# Patient Record
Sex: FEMALE | Race: WHITE | Employment: OTHER | URBAN - METROPOLITAN AREA
[De-identification: names, ages, dates, MRNs, and addresses within clinical notes are randomized per-mention and may not be internally consistent; named-entity substitution may affect disease eponyms.]

---

## 2017-02-22 ENCOUNTER — TELEPHONE (OUTPATIENT)
Dept: INTERNAL MEDICINE CLINIC | Age: 72
End: 2017-02-22

## 2017-02-22 NOTE — TELEPHONE ENCOUNTER
Spoke with Chong Mondragon at Dr Renny Velazquez office who asked for results of last pap smear. Faxed results of last pap smear from 05/15/2012 to Chong Mondragon.

## 2017-02-27 ENCOUNTER — TELEPHONE (OUTPATIENT)
Dept: INTERNAL MEDICINE CLINIC | Age: 72
End: 2017-02-27

## 2017-07-21 ENCOUNTER — OFFICE VISIT (OUTPATIENT)
Dept: INTERNAL MEDICINE CLINIC | Age: 72
End: 2017-07-21

## 2017-07-21 VITALS
BODY MASS INDEX: 22.02 KG/M2 | DIASTOLIC BLOOD PRESSURE: 76 MMHG | TEMPERATURE: 98.1 F | HEART RATE: 72 BPM | WEIGHT: 129 LBS | SYSTOLIC BLOOD PRESSURE: 112 MMHG | HEIGHT: 64 IN | OXYGEN SATURATION: 97 % | RESPIRATION RATE: 12 BRPM

## 2017-07-21 DIAGNOSIS — L60.8 CHANGE IN NAIL APPEARANCE: Primary | ICD-10-CM

## 2017-07-21 NOTE — PROGRESS NOTES
Reena Morrison is a 74 y.o. female who was seen in clinic today (7/21/2017). Assessment & Plan:  Diagnoses and all orders for this visit:    1. Change in nail appearance- new dx, chronic issue, I spent 15 minutes with the patient and >50% of the time was spent counseling on differential diagnosis, treatment options, and likely outcomes. Not entirely sure if this is fungal.  Good possibility it is just the edges of the nail having been lifted up. Reviewed successive p.o. versus topical antifungal meds. Reviewed seeing podiatrist for second opinion and fungal stain. She is okay with monitoring for now and will follow up with her PCP         Follow-up Disposition:  Return if symptoms worsen or fail to improve.       ----------------------------------------------------------------------    Subjective:  Jayshree Schultz was seen today for Nail Problem    Dermatology Review  She is here to talk about toe nail fungus. She noticed it years ago, with gradually worsening since that time. Location: bilateral big toes. Symptoms include: horizontal ridges on the toe nails and discolorations on the edges. No h/o trauma. Does not get pedicures regularly. Treatment to date has included none.      ----------------------------------------------------------------------      Prior to Admission medications    Medication Sig Start Date End Date Taking? Authorizing Provider   aspirin delayed-release 81 mg tablet Take 1 Tab by mouth daily. Yes Kerry Pineda MD   azelastine (ASTELIN) 137 mcg nasal spray as needed. 3/14/14  Yes Historical Provider   CHOLECALCIFEROL, VITAMIN D3, (VITAMIN D3 PO) Take 400 Units by mouth daily. Yes Historical Provider   PV W-O MARIEL/FERROUS FUMARATE/FA (M-VIT PO) Take  by mouth. Takes one multivitamin per day. Yes Historical Provider   CALCIUM CARBONATE (CALCIUM 300 PO) Take 300 mg by mouth daily.    Yes Historical Provider          Allergies   Allergen Reactions    Lipitor [Atorvastatin] Myalgia    Sulfa (Sulfonamide Antibiotics) Hives    Zocor [Simvastatin] Myalgia           Review of Systems   Constitutional: Negative for chills and fever. Skin: Negative for itching and rash. Objective:   Physical Exam   Cardiovascular:   Pulses:       Dorsalis pedis pulses are 2+ on the right side, and 2+ on the left side. Posterior tibial pulses are 2+ on the right side, and 2+ on the left side. Musculoskeletal:        Right foot: There is no bony tenderness and no swelling. Left foot: There is no bony tenderness and no swelling. Skin:   Bilateral 1st toes there is some jack around the tips & edges, no hypertrophied, there are horizontal ridges (multiple), no other deformities         Visit Vitals    /76    Pulse 72    Temp 98.1 °F (36.7 °C) (Oral)    Resp 12    Ht 5' 4\" (1.626 m)    Wt 129 lb (58.5 kg)    SpO2 97%    BMI 22.14 kg/m2         Disclaimer:  Advised her to call back or return to office if symptoms worsen/change/persist.  Discussed expected course/resolution/complications of diagnosis in detail with patient. Medication risks/benefits/costs/interactions/alternatives discussed with patient. She was given an after visit summary which includes diagnoses, current medications, & vitals. She expressed understanding with the diagnosis and plan.         Serafin Hashimoto, MD

## 2017-11-06 ENCOUNTER — HOSPITAL ENCOUNTER (OUTPATIENT)
Dept: MAMMOGRAPHY | Age: 72
Discharge: HOME | End: 2017-11-06
Attending: INTERNAL MEDICINE
Payer: MEDICARE

## 2017-11-06 DIAGNOSIS — Z12.39 BREAST SCREENING: ICD-10-CM

## 2017-11-06 PROCEDURE — 77067 SCR MAMMO BI INCL CAD: CPT

## 2017-11-08 ENCOUNTER — TELEPHONE (OUTPATIENT)
Dept: INTERNAL MEDICINE CLINIC | Age: 72
End: 2017-11-08

## 2017-11-08 DIAGNOSIS — E55.9 VITAMIN D DEFICIENCY: ICD-10-CM

## 2017-11-08 DIAGNOSIS — E78.00 HYPERCHOLESTEREMIA: Primary | ICD-10-CM

## 2017-11-08 NOTE — TELEPHONE ENCOUNTER
----- Message from Juan Horner LPN sent at 37/42/1643  4:55 PM EDT -----  Regarding: FW: Non-Urgent Medical Question  Contact: [phone]  Dr Greg Cortes is out of the office this week and will return 11/07/2017, she will need to sign the lab requisition and it could be left at the  for you to  or mailed but I am unable to email this to you. Thanks    Santa Irwin  ----- Message -----     From: Rogers Lopez     Sent: 10/31/2017   2:11 PM       To: [facility]  Subject: Non-Urgent Medical Question                      I have an appointment on 11/17 for my annual physical.  I parish like to have my blood work completed before the appointment. Can an order for my blood work be emailed to me?   Thanks    Sahil Galindo

## 2017-11-17 ENCOUNTER — OFFICE VISIT (OUTPATIENT)
Dept: INTERNAL MEDICINE CLINIC | Age: 72
End: 2017-11-17

## 2017-11-17 VITALS
RESPIRATION RATE: 16 BRPM | HEIGHT: 64 IN | DIASTOLIC BLOOD PRESSURE: 80 MMHG | HEART RATE: 72 BPM | WEIGHT: 128.4 LBS | BODY MASS INDEX: 21.92 KG/M2 | OXYGEN SATURATION: 98 % | SYSTOLIC BLOOD PRESSURE: 124 MMHG | TEMPERATURE: 97.8 F

## 2017-11-17 DIAGNOSIS — Z13.39 SCREENING FOR ALCOHOLISM: ICD-10-CM

## 2017-11-17 DIAGNOSIS — Z13.31 SCREENING FOR DEPRESSION: ICD-10-CM

## 2017-11-17 DIAGNOSIS — M85.89 OSTEOPENIA OF MULTIPLE SITES: ICD-10-CM

## 2017-11-17 DIAGNOSIS — Z00.00 MEDICARE ANNUAL WELLNESS VISIT, SUBSEQUENT: Primary | ICD-10-CM

## 2017-11-17 DIAGNOSIS — E78.00 HYPERCHOLESTEREMIA: ICD-10-CM

## 2017-11-17 LAB
25(OH)D3+25(OH)D2 SERPL-MCNC: 51.1 NG/ML (ref 30–100)
ALBUMIN SERPL-MCNC: 4.4 G/DL (ref 3.5–4.8)
ALBUMIN/GLOB SERPL: 1.7 {RATIO} (ref 1.2–2.2)
ALP SERPL-CCNC: 83 IU/L (ref 39–117)
ALT SERPL-CCNC: 8 IU/L (ref 0–32)
AST SERPL-CCNC: 14 IU/L (ref 0–40)
BASOPHILS # BLD AUTO: 0 X10E3/UL (ref 0–0.2)
BASOPHILS NFR BLD AUTO: 0 %
BILIRUB SERPL-MCNC: 0.3 MG/DL (ref 0–1.2)
BUN SERPL-MCNC: 16 MG/DL (ref 8–27)
BUN/CREAT SERPL: 20 (ref 12–28)
CALCIUM SERPL-MCNC: 9.2 MG/DL (ref 8.7–10.3)
CHLORIDE SERPL-SCNC: 99 MMOL/L (ref 96–106)
CHOLEST SERPL-MCNC: 264 MG/DL (ref 100–199)
CO2 SERPL-SCNC: 28 MMOL/L (ref 18–29)
CREAT SERPL-MCNC: 0.8 MG/DL (ref 0.57–1)
EOSINOPHIL # BLD AUTO: 0.1 X10E3/UL (ref 0–0.4)
EOSINOPHIL NFR BLD AUTO: 3 %
ERYTHROCYTE [DISTWIDTH] IN BLOOD BY AUTOMATED COUNT: 13.6 % (ref 12.3–15.4)
GFR SERPLBLD CREATININE-BSD FMLA CKD-EPI: 73 ML/MIN/1.73
GFR SERPLBLD CREATININE-BSD FMLA CKD-EPI: 84 ML/MIN/1.73
GLOBULIN SER CALC-MCNC: 2.6 G/DL (ref 1.5–4.5)
GLUCOSE SERPL-MCNC: 87 MG/DL (ref 65–99)
HCT VFR BLD AUTO: 38.4 % (ref 34–46.6)
HDLC SERPL-MCNC: 80 MG/DL
HGB BLD-MCNC: 12.7 G/DL (ref 11.1–15.9)
IMM GRANULOCYTES # BLD: 0 X10E3/UL (ref 0–0.1)
IMM GRANULOCYTES NFR BLD: 0 %
LDLC SERPL CALC-MCNC: 164 MG/DL (ref 0–99)
LYMPHOCYTES # BLD AUTO: 1.3 X10E3/UL (ref 0.7–3.1)
LYMPHOCYTES NFR BLD AUTO: 28 %
MCH RBC QN AUTO: 33.2 PG (ref 26.6–33)
MCHC RBC AUTO-ENTMCNC: 33.1 G/DL (ref 31.5–35.7)
MCV RBC AUTO: 100 FL (ref 79–97)
MONOCYTES # BLD AUTO: 0.4 X10E3/UL (ref 0.1–0.9)
MONOCYTES NFR BLD AUTO: 10 %
NEUTROPHILS # BLD AUTO: 2.6 X10E3/UL (ref 1.4–7)
NEUTROPHILS NFR BLD AUTO: 59 %
PLATELET # BLD AUTO: 268 X10E3/UL (ref 150–379)
POTASSIUM SERPL-SCNC: 4.4 MMOL/L (ref 3.5–5.2)
PROT SERPL-MCNC: 7 G/DL (ref 6–8.5)
RBC # BLD AUTO: 3.83 X10E6/UL (ref 3.77–5.28)
SODIUM SERPL-SCNC: 143 MMOL/L (ref 134–144)
TRIGL SERPL-MCNC: 98 MG/DL (ref 0–149)
VLDLC SERPL CALC-MCNC: 20 MG/DL (ref 5–40)
WBC # BLD AUTO: 4.4 X10E3/UL (ref 3.4–10.8)

## 2017-11-17 NOTE — PROGRESS NOTES
Chief Complaint   Patient presents with   [address] [place] [address] Annual Wellness Visit     1. Have you been to the ER, urgent care clinic since your last visit? Hospitalized since your last visit? No    2. Have you seen or consulted any other health care providers outside of the [address] [place] [address] since your last visit? Include any pap smears or colon screening.  No

## 2017-11-17 NOTE — PROGRESS NOTES
This is a Subsequent Medicare Annual Wellness Exam (AWV) (Performed 12 months after IPPE or effective date of Medicare Part B enrollment)    I have reviewed the patient's medical history in detail and updated the computerized patient record. Right foot pain and is seeing Dr Emelyn Walton at [address] [place] [address] for pain in her foot and is in PT for her ankle pain. Health maintenance reviewed and updated with patient today at visit. Exercise: she is still playing tennis and pickle ball. These activities do not bother feet. Hypercholesterolemia: She is following a good low-fat low-cholesterol diet. Her calcium cardiac score is 0 in the past.  She had been on statins in the past but was not able to tolerate and at this point has not been felt to be necessary. Osteopenia:  Taking calcium and vit D,  last bone density reviewed. No falls. History     Past Medical History:   Diagnosis Date    Allergic rhinitis, cause unspecified 12/17/2009    Anemia NEC     Benign neoplasm of colon     Hypercholesterolemia     Other and unspecified disorder of bone and cartilage     Other and unspecified hyperlipidemia 12/21/2010    S/P colonoscopy 2002      Past Surgical History:   Procedure Laterality Date    BREAST SURGERY PROCEDURE UNLISTED      left & rt. bx - benign    ENDOSCOPY, COLON, DIAGNOSTIC  7/06    due repeat 2011 per note    HX BREAST BIOPSY Left 1990's    benign    HX BREAST BIOPSY Right 1980's    benign    HX BUNIONECTOMY      HX HYSTERECTOMY      partial    HX ORTHOPAEDIC      ACL - right knee    HX ORTHOPAEDIC  2014    right foot surgery    HX ORTHOPAEDIC  2015    left thumb surgery    HX TONSILLECTOMY      US GUIDED CORE BREAST BIOPSY Left 1990's    benign     Current Outpatient Prescriptions   Medication Sig Dispense Refill    aspirin delayed-release 81 mg tablet Take 1 Tab by mouth daily. 30 Tab 11    azelastine (ASTELIN) 137 mcg nasal spray as needed.       CHOLECALCIFEROL, VITAMIN D3, (VITAMIN D3 PO) Take 400 Units by mouth daily.  PV W-O MARIEL/FERROUS FUMARATE/FA (M-VIT PO) Take  by mouth. Takes one multivitamin per day.  CALCIUM CARBONATE (CALCIUM 300 PO) Take 300 mg by mouth daily. Allergies   Allergen Reactions    Lipitor [Atorvastatin] Myalgia    Sulfa (Sulfonamide Antibiotics) Hives    Zocor [Simvastatin] Myalgia     Family History   Problem Relation Age of Onset    Cancer Mother      breast    Breast Cancer Mother 48    Heart Disease Father     Cancer Maternal Grandmother      breast    Breast Cancer Maternal Grandmother      Social History   Substance Use Topics    Smoking status: Former Smoker     Types: Cigarettes     Quit date: 1987    Smokeless tobacco: Never Used      Comment: former cigarette smoker    Alcohol use 0.5 oz/week     1 Standard drinks or equivalent per week     Patient Active Problem List   Diagnosis Code    Osteopenia M85.80    Allergic rhinitis J30.9    Leukopenia D72.819    Hypercholesteremia E78.00       Depression Risk Factor Screening:     PHQ over the last two weeks 2016   Little interest or pleasure in doing things Not at all   Feeling down, depressed or hopeless Not at all   Total Score PHQ 2 0     Alcohol Risk Factor Screenin drink per week. Functional Ability and Level of Safety:   Hearing Loss  Hearing is good. Activities of Daily Living  The home contains: no safety equipment. Patient does total self care    Fall Risk  Fall Risk Assessment, last 12 mths 2016   Able to walk? Yes   Fall in past 12 months? No       Abuse Screen  Patient is not abused    Cognitive Screening   Evaluation of Cognitive Function:  Has your family/caregiver stated any concerns about your memory: no  Normal    Patient Care Team   Patient Care Team:  Manny Calle MD as PCP - General    Assessment/Plan   Education and counseling provided:  Are appropriate based on today's review and evaluation    Diagnoses and all orders for this visit:    1. Medicare annual wellness visit, subsequent  Health maintenance reviewed and updated with patient today at visit. Counseled continue healthy lifestyle, exercise, diet and weight. ..  2. Screening for alcoholism  -     Annual  Alcohol Screen 15 min ()    3. Screening for depression  -     Depression Screen Annual    4. Osteopenia of multiple sites  Calcium, vitamin D, exercise. 5. Hypercholesteremia  Please continue to work on low fat, low cholesterol diet and exercise. Reviewed lab work with her today. Orders Placed This Encounter    Depression Screen Annual    Annual  Alcohol Screen 15 min ()    I have discussed the diagnosis with the patient and the intended plan as seen in the above orders. The patient has received an after-visit summary and questions were answered concerning future plans. I have discussed medication side effects and warnings with the patient as well. She has expressed understanding of the diagnosis and plan    Follow-up Disposition:  Return in about 1 year (around 11/17/2018) for follow up.

## 2017-11-17 NOTE — PATIENT INSTRUCTIONS
Medicare Wellness Visit, Female    The best way to live healthy is to have a healthy lifestyle by eating a well-balanced diet, exercising regularly, limiting alcohol and stopping smoking. Regular physical exams and screening tests are another way to keep healthy. Preventive exams provided by your health care provider can find health problems before they become diseases or illnesses. Preventive services including immunizations, screening tests, monitoring and exams can help you take care of your own health. All people over age 72 should have a pneumovax  and and a prevnar shot to prevent pneumonia. These are once in a lifetime unless you and your provider decide differently. All people over 65 should have a yearly flu shot and a tetanus vaccine every 10 years. A bone mass density to screen for osteoporosis or thinning of the bones should be done every 2 years after 65. Screening for diabetes mellitus with a blood sugar test should be done every year. Glaucoma is a disease of the eye due to increased ocular pressure that can lead to blindness and it should be done every year by an eye professional.    Cardiovascular screening tests that check for elevated lipids (fatty part of blood) which can lead to heart disease and strokes should be done every 5 years. Colorectal screening that evaluates for blood or polyps in your colon should be done yearly as a stool test or every five years as a flexible sigmoidoscope or every 10 years as a colonoscopy up to age 76. Breast cancer screening with a mammogram is recommended  for women age 54-69. Hepatitis C screening is also recommended for anyone born between [birth year] through [address]. A shingles vaccine is also recommended once in a lifetime after age 61. Your Medicare Wellness Exam is recommended annually.     Here is a list of your current Health Maintenance items with a due date:

## 2017-11-17 NOTE — PROGRESS NOTES
Yandy Yarbrough is a 74 y.o. female and presents for Annual Medicare Wellness Visit. Assessment of cognitive impairment: Alert and oriented x ***. Abuse Screen:    Abuse Screening Questionnaire 4/29/2016   Do you ever feel afraid of your partner? N   Are you in a relationship with someone who physically or mentally threatens you? N   Is it safe for you to go home? Y       Depression Screen:   PHQ over the last two weeks 4/29/2016   Little interest or pleasure in doing things Not at all   Feeling down, depressed or hopeless Not at all   Total Score PHQ 2 0       Fall Risk Assessment:    Fall Risk Assessment, last 12 mths 4/29/2016   Able to walk? Yes   Fall in past 12 months? No       Activities of Daily Living:  No flowsheet data found. Health Maintenance:  Daily Low Dose Aspirin: {yes/ no default no:19426::\"contraindicated\",\"allergy\"}  Bone Density: {Immunization status:22111::\"order placed\",\"NA\",\"WNL- due ***\"}  Glaucoma Screening: {yes/ no default no:19426::\"no\"}  Immunizations:    Tetanus: {Immunization status:00851::\"patient declines\",\"order placed\"}. Influenza: {Immunization status:40147::\"patient declines\",\"order placed\"}. Shingles:  {Immunization status:88772::\"patient declines\",\"order placed\"}. Pneumovax:  {Immunization status:62354::\"patient declines\",\"order placed\"}. Prevnar: {Immunization status:37570::\"order placed\",\"patient declines\"}. Cancer screening:    Cervical: {Immunization status:96398::\"done by OBGYN\",\"NA\"}. Breast: {Immunization status:92825::\"done by OBGYN\",\"NA\"}. Colon: {Immunization status:89764::\"referral placed to GI\",\"NA\"}. Prostate:  {Immunization status:34575::\"referral placed to Urology\",\"NA\",\"managed by Urology\"}    Advance Care Planning:   End of Life Planning: {advanced directive:99489::\"has NO advanced directive  - add't info provided\",\"reviewed DNR/DNI and patient is not interested\"}.   Provided pt with \"Respecting Choices packet of Information\" {gen no default/yes/free text:143293::\"yes\"}  Offered facilitator session with NN {gen no default/yes/free text:559401::\"yes\"}     Medications/Allergies: Reviewed with patient  Prior to Admission medications    Medication Sig Start Date End Date Taking? Authorizing Provider   aspirin delayed-release 81 mg tablet Take 1 Tab by mouth daily. Jonny West MD   azelastine (ASTELIN) 137 mcg nasal spray as needed. 3/14/14   Historical Provider   CHOLECALCIFEROL, VITAMIN D3, (VITAMIN D3 PO) Take 400 Units by mouth daily. Historical Provider   PV W-O MARIEL/FERROUS FUMARATE/FA (M-VIT PO) Take  by mouth. Takes one multivitamin per day. Historical Provider   CALCIUM CARBONATE (CALCIUM 300 PO) Take 300 mg by mouth daily.     Historical Provider     Allergies   Allergen Reactions    Lipitor [Atorvastatin] Myalgia    Sulfa (Sulfonamide Antibiotics) Hives    Zocor [Simvastatin] Myalgia       PSH: Reviewed with patient  Past Surgical History:   Procedure Laterality Date    BREAST SURGERY PROCEDURE UNLISTED      left & rt. bx - benign    ENDOSCOPY, COLON, DIAGNOSTIC  7/06    due repeat 2011 per note    HX BREAST BIOPSY Left 1990's    benign    HX BREAST BIOPSY Right 1980's    benign    HX BUNIONECTOMY      HX HYSTERECTOMY      partial    HX ORTHOPAEDIC      ACL - right knee    HX ORTHOPAEDIC  2014    right foot surgery    HX ORTHOPAEDIC  2015    left thumb surgery    HX TONSILLECTOMY      US GUIDED CORE BREAST BIOPSY Left 1990's    benign        SH: Reviewed with patient  Social History   Substance Use Topics    Smoking status: Former Smoker     Types: Cigarettes     Quit date: 1/1/1987    Smokeless tobacco: Never Used      Comment: former cigarette smoker    Alcohol use 0.5 oz/week     1 Standard drinks or equivalent per week       FH: Reviewed with patient  Family History   Problem Relation Age of Onset    Cancer Mother      breast    Breast Cancer Mother 48    Heart Disease Father     Cancer Maternal Grandmother      breast    Breast Cancer Maternal Grandmother          Objective: There were no vitals taken for this visit. There is no height or weight on file to calculate BMI. Alcohol Risk Screen:   On any occasion during past 3 months, have you had more than 3 drinks (female) or 4 drinks (male) containing alcohol? {YES/NO:35459927}  Do you average more than 7 drinks (female) or 14 drinks (male) per week? {YES/NO:57433767}  Type and Amount: {ALCOHOL TYPE:27141}    Tobacco Abuse:  {YES/NO:54987092}    Nutrition Screen:  {Nutrition assessment:40524}    Hearing Loss:  {Desc; hearing loss:41035::\"denies any hearing loss\",\"wears hearing aides\"}    Vision Loss:   Wears glasses, contact lenses, or have any other visual impairment  {gen no default/yes/free text:899379::\"yes\"}    Activities of Daily Living:  {ADL:59409::\"Self-care\"}. Requires assistance with: {ADLs:07538::\"no ADLs\"}  Patient handle his/her own medications  {gen no default/yes/free text:493832::\"yes\"} Use of pill box  {gen no default/yes/free text:848805::\"yes\"}    Current medical providers:    Patient Care Team:  Galina Lyon MD as PCP - General      Plan:      No orders of the defined types were placed in this encounter. Health Maintenance   Topic Date Due    Pneumococcal 65+ High/Highest Risk (2 of 2 - PPSV23) 03/04/2013    MEDICARE YEARLY EXAM  04/30/2017    COLONOSCOPY  06/06/2017    Influenza Age 5 to Adult  08/01/2017    GLAUCOMA SCREENING Q2Y  05/02/2018    DTaP/Tdap/Td series (2 - Td) 05/21/2023    OSTEOPOROSIS SCREENING (DEXA)  Completed    ZOSTER VACCINE AGE 60>  Completed       *Patient verbalized understanding and agreement with the plan. A copy of the After Visit Summary with personalized health plan was given to the patient today. Physical Exam will be performed by PCP and documented under a separate Progress Note.

## 2017-11-30 ENCOUNTER — HOSPITAL ENCOUNTER (OUTPATIENT)
Age: 72
Setting detail: OUTPATIENT SURGERY
Discharge: HOME | End: 2017-11-30
Attending: INTERNAL MEDICINE | Admitting: INTERNAL MEDICINE
Payer: MEDICARE

## 2017-11-30 ENCOUNTER — ANESTHESIA EVENT (OUTPATIENT)
Dept: ENDOSCOPY | Age: 72
End: 2017-11-30
Payer: MEDICARE

## 2017-11-30 ENCOUNTER — ANESTHESIA (OUTPATIENT)
Dept: ENDOSCOPY | Age: 72
End: 2017-11-30
Payer: MEDICARE

## 2017-11-30 VITALS
WEIGHT: 128 LBS | OXYGEN SATURATION: 99 % | RESPIRATION RATE: 16 BRPM | SYSTOLIC BLOOD PRESSURE: 130 MMHG | DIASTOLIC BLOOD PRESSURE: 77 MMHG | TEMPERATURE: 98.1 F | HEART RATE: 67 BPM | BODY MASS INDEX: 21.97 KG/M2

## 2017-11-30 PROCEDURE — 76060000031 HC ANESTHESIA FIRST 0.5 HR: Performed by: INTERNAL MEDICINE

## 2017-11-30 PROCEDURE — 74011250636 HC RX REV CODE- 250/636

## 2017-11-30 PROCEDURE — 76040000019: Performed by: INTERNAL MEDICINE

## 2017-11-30 PROCEDURE — 74011000250 HC RX REV CODE- 250

## 2017-11-30 PROCEDURE — 77030027957 HC TBNG IRR ENDOGTR BUSS -B: Performed by: INTERNAL MEDICINE

## 2017-11-30 RX ORDER — LIDOCAINE HYDROCHLORIDE 20 MG/ML
INJECTION, SOLUTION EPIDURAL; INFILTRATION; INTRACAUDAL; PERINEURAL AS NEEDED
Status: DISCONTINUED | OUTPATIENT
Start: 2017-11-30 | End: 2017-11-30 | Stop reason: HOSPADM

## 2017-11-30 RX ORDER — FLUMAZENIL 0.1 MG/ML
0.2 INJECTION INTRAVENOUS
Status: CANCELLED | OUTPATIENT
Start: 2017-11-30 | End: 2017-11-30

## 2017-11-30 RX ORDER — PROPOFOL 10 MG/ML
INJECTION, EMULSION INTRAVENOUS AS NEEDED
Status: DISCONTINUED | OUTPATIENT
Start: 2017-11-30 | End: 2017-11-30 | Stop reason: HOSPADM

## 2017-11-30 RX ORDER — EPINEPHRINE 0.1 MG/ML
1 INJECTION INTRACARDIAC; INTRAVENOUS
Status: CANCELLED | OUTPATIENT
Start: 2017-11-30 | End: 2017-11-30

## 2017-11-30 RX ORDER — NALOXONE HYDROCHLORIDE 0.4 MG/ML
0.4 INJECTION, SOLUTION INTRAMUSCULAR; INTRAVENOUS; SUBCUTANEOUS
Status: CANCELLED | OUTPATIENT
Start: 2017-11-30 | End: 2017-11-30

## 2017-11-30 RX ORDER — FENTANYL CITRATE 50 UG/ML
100 INJECTION, SOLUTION INTRAMUSCULAR; INTRAVENOUS
Status: CANCELLED | OUTPATIENT
Start: 2017-11-30 | End: 2017-11-30

## 2017-11-30 RX ORDER — DEXTROMETHORPHAN/PSEUDOEPHED 2.5-7.5/.8
1.2 DROPS ORAL
Status: CANCELLED | OUTPATIENT
Start: 2017-11-30

## 2017-11-30 RX ORDER — ATROPINE SULFATE 0.1 MG/ML
0.5 INJECTION INTRAVENOUS
Status: CANCELLED | OUTPATIENT
Start: 2017-11-30 | End: 2017-11-30

## 2017-11-30 RX ORDER — SODIUM CHLORIDE 0.9 % (FLUSH) 0.9 %
5-10 SYRINGE (ML) INJECTION EVERY 8 HOURS
Status: CANCELLED | OUTPATIENT
Start: 2017-11-30 | End: 2017-11-30

## 2017-11-30 RX ORDER — SODIUM CHLORIDE 9 MG/ML
50 INJECTION, SOLUTION INTRAVENOUS CONTINUOUS
Status: CANCELLED | OUTPATIENT
Start: 2017-11-30 | End: 2017-11-30

## 2017-11-30 RX ORDER — MIDAZOLAM HYDROCHLORIDE 1 MG/ML
.25-5 INJECTION, SOLUTION INTRAMUSCULAR; INTRAVENOUS
Status: CANCELLED | OUTPATIENT
Start: 2017-11-30 | End: 2017-11-30

## 2017-11-30 RX ORDER — SODIUM CHLORIDE 9 MG/ML
INJECTION, SOLUTION INTRAVENOUS
Status: DISCONTINUED | OUTPATIENT
Start: 2017-11-30 | End: 2017-11-30 | Stop reason: HOSPADM

## 2017-11-30 RX ORDER — SODIUM CHLORIDE 0.9 % (FLUSH) 0.9 %
5-10 SYRINGE (ML) INJECTION AS NEEDED
Status: CANCELLED | OUTPATIENT
Start: 2017-11-30 | End: 2017-11-30

## 2017-11-30 RX ADMIN — SODIUM CHLORIDE: 9 INJECTION, SOLUTION INTRAVENOUS at 13:10

## 2017-11-30 RX ADMIN — PROPOFOL 100 MG: 10 INJECTION, EMULSION INTRAVENOUS at 13:20

## 2017-11-30 RX ADMIN — LIDOCAINE HYDROCHLORIDE 60 MG: 20 INJECTION, SOLUTION EPIDURAL; INFILTRATION; INTRACAUDAL; PERINEURAL at 13:20

## 2017-11-30 RX ADMIN — PROPOFOL 25 MG: 10 INJECTION, EMULSION INTRAVENOUS at 13:26

## 2017-11-30 RX ADMIN — PROPOFOL 50 MG: 10 INJECTION, EMULSION INTRAVENOUS at 13:24

## 2017-11-30 NOTE — ANESTHESIA PREPROCEDURE EVALUATION
Anesthetic History   No history of anesthetic complications            Review of Systems / Medical History  Patient summary reviewed, nursing notes reviewed and pertinent labs reviewed    Pulmonary  Within defined limits                 Neuro/Psych   Within defined limits           Cardiovascular  Within defined limits                     GI/Hepatic/Renal  Within defined limits              Endo/Other  Within defined limits      Anemia     Other Findings              Physical Exam    Airway  Mallampati: II  TM Distance: > 6 cm  Neck ROM: normal range of motion   Mouth opening: Normal     Cardiovascular  Regular rate and rhythm,  S1 and S2 normal,  no murmur, click, rub, or gallop             Dental  No notable dental hx       Pulmonary  Breath sounds clear to auscultation               Abdominal  GI exam deferred       Other Findings            Anesthetic Plan    ASA: 2  Anesthesia type: MAC          Induction: Intravenous  Anesthetic plan and risks discussed with: Patient

## 2017-11-30 NOTE — ANESTHESIA POSTPROCEDURE EVALUATION
Post-Anesthesia Evaluation and Assessment    Patient: Anirudh Sharpe MRN: [id number]  SSN: [id number]    YOB: 1945  Age: 74 y.o. Sex: female       Cardiovascular Function/Vital Signs  Visit Vitals    /77    Pulse 67    Temp 36.7 °C (98.1 °F)    Resp 16    Wt 58.1 kg (128 lb)    SpO2 99%    BMI 21.97 kg/m2       Patient is status post MAC anesthesia for Procedure(s):  COLONOSCOPY. Nausea/Vomiting: None    Postoperative hydration reviewed and adequate. Pain:  Pain Scale 1: Numeric (0 - 10) (11/30/17 1354)  Pain Intensity 1: 0 (11/30/17 1354)   Managed    Neurological Status: At baseline    Mental Status and Level of Consciousness: Arousable    Pulmonary Status:   O2 Device: Room air (11/30/17 1354)   Adequate oxygenation and airway patent    Complications related to anesthesia: None    Post-anesthesia assessment completed.  No concerns    Signed By: Shobha Caraballo MD     November 30, 2017

## 2017-11-30 NOTE — DISCHARGE INSTRUCTIONS
Anat 62  e  [address]  [id number]  1945    COLON DISCHARGE INSTRUCTIONS    DISCOMFORT:  Redness at IV site- apply warm compress to area; if redness or soreness persist- contact your physician  There may be a slight amount of blood passed from the rectum  Gaseous discomfort- walking, belching will help relieve any discomfort  You may not operate a vehicle for 12 hours  You may not engage in an occupation involving machinery or appliances for rest of today  You may not drink alcoholic beverages for at least 12 hours  Avoid making any critical decisions for at least 24 hour  DIET:   Regular diet. - however -  remember your colon is empty and a heavy meal will produce gas. Avoid these foods:  vegetables, fried / greasy foods, carbonated drinks for today         ACTIVITY:  You may resume your normal daily activities it is recommended that you spend the remainder of the day resting -  avoid any strenuous activity. CALL M.D. ANY SIGN OF:   Increasing pain, nausea, vomiting  Abdominal distension (swelling)  New increased bleeding (oral or rectal)  Fever (chills)  Pain in chest area  Bloody discharge from nose or mouth  Shortness of breath     Follow-up Instructions:   Call Dr. Jonathan Hanna for any questions or problems.    Telephone # [phone]  Should have a repeat colonoscopy in 5 years    Impression:  Diverticulosis

## 2017-11-30 NOTE — IP AVS SNAPSHOT
[address] 
[phone] Patient: Luis Enrique Cadet MRN: [id number] KTB:[id number]/81/[id number] About your hospitalization You were admitted on:  November 30, 2017 You last received care in the:  [facility] ENDOSCOPY You were discharged on:  November 30, 2017 Why you were hospitalized Your primary diagnosis was:  Not on File Discharge Orders None A check jessica indicates which time of day the medication should be taken. My Medications TAKE these medications as instructed Instructions Each Dose to Equal  
 Morning Noon Evening Bedtime  
 aspirin delayed-release 81 mg tablet Your last dose was: Your next dose is: Take 1 Tab by mouth daily. 81 mg  
    
   
   
   
  
 azelastine 137 mcg (0.1 %) nasal spray Commonly known as:  ASTELIN Your last dose was: Your next dose is:    
   
   
 as needed. CALCIUM 300 PO Your last dose was: Your next dose is: Take 300 mg by mouth daily. 300 mg  
    
   
   
   
  
 M-VIT PO Your last dose was: Your next dose is: Take  by mouth. Takes one multivitamin per day. VITAMIN D3 PO Your last dose was: Your next dose is: Take 400 Units by mouth daily. 400 Units Discharge Instructions [address] Luis Enrique Cadet [id number] 
1945 COLON DISCHARGE INSTRUCTIONS DISCOMFORT: 
Redness at IV site- apply warm compress to area; if redness or soreness persist- contact your physician There may be a slight amount of blood passed from the rectum Gaseous discomfort- walking, belching will help relieve any discomfort You may not operate a vehicle for 12 hours You may not engage in an occupation involving machinery or appliances for rest of today You may not drink alcoholic beverages for at least 12 hours Avoid making any critical decisions for at least 24 hour DIET: 
 Regular diet.  however -  remember your colon is empty and a heavy meal will produce gas. Avoid these foods:  vegetables, fried / greasy foods, carbonated drinks for today ACTIVITY: 
You may resume your normal daily activities it is recommended that you spend the remainder of the day resting -  avoid any strenuous activity. CALL M.D. ANY SIGN OF: Increasing pain, nausea, vomiting Abdominal distension (swelling) New increased bleeding (oral or rectal) Fever (chills) Pain in chest area Bloody discharge from nose or mouth Shortness of breath Follow-up Instructions: 
 Call Dr. Ishmael Calzada for any questions or problems. Telephone # [phone] Should have a repeat colonoscopy in 5 years Impression:  Diverticulosis Introducing [facility] SERVICES! Dear Luis Escalera: 
Thank you for requesting a DocVerse account. Our records indicate that you already have an active DocVerse account. You can access your account anytime at [email or web address] Did you know that you can access your hospital and ER discharge instructions at any time in DocVerse? You can also review all of your test results from your hospital stay or ER visit. Additional Information If you have questions, please visit the Frequently Asked Questions section of the DocVerse website at [email or web address] Remember, DocVerse is NOT to be used for urgent needs. For medical emergencies, dial 911. Now available from your iPhone and Android! Providers Seen During Your Hospitalization Provider Specialty Primary office phone Elisa Morton MD Gastroenterology [phone] Your Primary Care Physician (PCP) Primary Care Physician Office Phone Office Fax Via Kee Joshi Fareed Faria [phone] You are allergic to the following Allergen Reactions Lipitor (Atorvastatin) Myalgia Oxycodone Other (comments) Unable to walk, \"completely knocks me out\" Sulfa (Sulfonamide Antibiotics) Hives Zocor (Simvastatin) Myalgia Recent Documentation Weight BMI OB Status Smoking Status 58.1 kg 21.97 kg/m2 Hysterectomy Former Smoker Emergency Contacts Name Discharge Info Relation Home Work Mobile Kassie Briones DISCHARGE CAREGIVER [3] Daughter [21] [phone] Patient Belongings The following personal items are in your possession at time of discharge: 
  Dental Appliances: None  Visual Aid: None Please provide this summary of care documentation to your next provider. Signatures-by signing, you are acknowledging that this After Visit Summary has been reviewed with you and you have received a copy. Patient Signature:  ____________________________________________________________ Date:  ____________________________________________________________  
  
Williams Shaikh Provider Signature:  ____________________________________________________________ Date:  ____________________________________________________________

## 2017-11-30 NOTE — ROUTINE PROCESS
Kerry Tineo  [birth year]  [id number]    Situation:  Verbal report received from: Erin Delatorre RN  Procedure: Procedure(s):  COLONOSCOPY    Background:    Preoperative diagnosis: HISTORY OF POLYPS   Postoperative diagnosis: Diverticulosis    :  Dr. Raimundo Downing  Assistant(s): Endoscopy Technician-1: Ngozi Woodard  Endoscopy RN-1: Hood Barreto RN    Specimens: * No specimens in log *  H. Pylori  no    Assessment:  Intra-procedure medications       Anesthesia gave intra-procedure sedation and medications, see anesthesia flow sheet yes    Intravenous fluids: NS@ KVO     Vital signs stable     Abdominal assessment: round and soft     Recommendation:  Discharge patient per MD order  Family or Friend Daughter will  pt  Permission to share finding with family or friend yes and n/a

## 2017-11-30 NOTE — H&P
[address]  [facility] [place] [address], [address] [facility]                 Ana Rosa Gill is a  74 y.o.  female who presents with history of polyps for screening. .        Past Medical History:   Diagnosis Date    Allergic rhinitis, cause unspecified 12/17/2009    Anemia NEC     Benign neoplasm of colon     Hypercholesterolemia      Past Surgical History:   Procedure Laterality Date    BREAST SURGERY PROCEDURE UNLISTED      left & rt. bx - benign    ENDOSCOPY, COLON, DIAGNOSTIC  7/06    due repeat 2011 per note    HX BREAST BIOPSY Left 1990's    benign    HX BREAST BIOPSY Right 1980's    benign    HX BUNIONECTOMY      HX HYSTERECTOMY      partial    HX ORTHOPAEDIC      ACL - right knee    HX ORTHOPAEDIC  2014    right foot surgery    HX ORTHOPAEDIC  2015    left thumb surgery    HX TONSILLECTOMY      US GUIDED CORE BREAST BIOPSY Left 1990's    benign     Allergies   Allergen Reactions    Lipitor [Atorvastatin] Myalgia    Oxycodone Other (comments)     Unable to walk, \"completely knocks me out\"    Sulfa (Sulfonamide Antibiotics) Hives    Zocor [Simvastatin] Myalgia         Visit Vitals    /85    Temp 98.5 °F (36.9 °C)    Resp 18    Wt 58.1 kg (128 lb)    SpO2 99%    BMI 21.97 kg/m2           PHYSICAL EXAM:  General: WD, WN. Alert, cooperative, no acute distress    HEENT: NC, Atraumatic. PERRLA, EOMI. Anicteric sclerae. Mallampati score 2  Lungs:  CTA Bilaterally. No Wheezing/Rhonchi/Rales. Heart:  Regular  rhythm,  No murmur (), No Rubs, No Gallops  Abdomen: Soft, Non distended, Non tender.  +Bowel sounds, no HSM  Extremities: No c/c/e  Neurologic:  CN 2-12 gi, Alert and oriented X 3. No acute neurological distress   Psych:   Good insight. Not anxious nor agitated. Plan:   Endoscopic procedure with TABITHA Bañuelos MD  11/30/2017  1:17 PM

## 2017-11-30 NOTE — PROCEDURES
[address]  [facility], [address] [facility]         Procedure:  Colonoscopy    :  Cindy Arana MD    Referring Provider: Reynaldo Box MD    Sedation:  MAC anesthesia Propofol      Prior to the procedure its objectives, risks, consequences and alternatives were discussed with the patient who then elected to proceed. The patient had the opportunity to ask questions and those questions were answered. A physical exam was performed. The heart, lungs, and mental status were examined prior to the procedure and found to be satisfactory for conscious sedation and for the procedure. Conscious sedation was initiated by the physician. Continuous pulse oximetry and blood pressure monitoring were used throughout the procedure. After appropriate analgesia and rectal exam, the colonoscope was passed into the anus and passed to the cecum without difficulty. The prep was good. On slow withdrawal of the scope, the cecum, ascending, colon, hepatic flexure, transverse colon, splenic flexure and descending colon were normal. In the sigmoid there were mild diverticulosis. The rectum was normal. Retroflexion exam of the rectum was normal She tolerated the procedure without complication and I recommend a repeat colonoscopy in 5 years. Specimen Removed:  None    Complications: None. EBL:  None.     Cindy Arana MD  11/30/2017  1:33 PM

## 2018-11-26 ENCOUNTER — TELEPHONE (OUTPATIENT)
Dept: INTERNAL MEDICINE CLINIC | Age: 73
End: 2018-11-26

## 2018-11-26 DIAGNOSIS — E78.00 HYPERCHOLESTEREMIA: Primary | ICD-10-CM

## 2018-11-26 NOTE — TELEPHONE ENCOUNTER
Jay Caro (Self) [phone] (H)     Pt would like to come in and have labs done for her appt on 11/29/18.

## 2018-11-26 NOTE — TELEPHONE ENCOUNTER
Last OV: 11-17-17  Next visit: 11-29-18  Last labs: 11-16-17     Pt would like order for labs prior to 11-29-18 appointment.  Labs pended and forwarded to provider for approval.

## 2018-11-28 NOTE — TELEPHONE ENCOUNTER
Voice mail message left for patient notifying lab slips may be picked up at front office . No PHI left on message.

## 2018-11-29 ENCOUNTER — OFFICE VISIT (OUTPATIENT)
Dept: INTERNAL MEDICINE CLINIC | Age: 73
End: 2018-11-29

## 2018-11-29 VITALS
RESPIRATION RATE: 16 BRPM | DIASTOLIC BLOOD PRESSURE: 82 MMHG | OXYGEN SATURATION: 98 % | HEIGHT: 64 IN | HEART RATE: 88 BPM | SYSTOLIC BLOOD PRESSURE: 136 MMHG | WEIGHT: 133.6 LBS | TEMPERATURE: 98.1 F | BODY MASS INDEX: 22.81 KG/M2

## 2018-11-29 DIAGNOSIS — Z23 ENCOUNTER FOR IMMUNIZATION: ICD-10-CM

## 2018-11-29 DIAGNOSIS — Z13.39 SCREENING FOR ALCOHOLISM: ICD-10-CM

## 2018-11-29 DIAGNOSIS — Z13.31 SCREENING FOR DEPRESSION: ICD-10-CM

## 2018-11-29 DIAGNOSIS — M85.89 OSTEOPENIA OF MULTIPLE SITES: ICD-10-CM

## 2018-11-29 DIAGNOSIS — E78.00 HYPERCHOLESTEREMIA: ICD-10-CM

## 2018-11-29 DIAGNOSIS — R03.0 ELEVATED BP WITHOUT DIAGNOSIS OF HYPERTENSION: ICD-10-CM

## 2018-11-29 DIAGNOSIS — Z00.00 MEDICARE ANNUAL WELLNESS VISIT, SUBSEQUENT: Primary | ICD-10-CM

## 2018-11-29 DIAGNOSIS — Z12.31 VISIT FOR SCREENING MAMMOGRAM: ICD-10-CM

## 2018-11-29 LAB
ALBUMIN SERPL-MCNC: 4.5 G/DL (ref 3.5–4.8)
ALBUMIN/GLOB SERPL: 1.9 {RATIO} (ref 1.2–2.2)
ALP SERPL-CCNC: 82 IU/L (ref 39–117)
ALT SERPL-CCNC: 10 IU/L (ref 0–32)
AST SERPL-CCNC: 20 IU/L (ref 0–40)
BASOPHILS # BLD AUTO: 0 X10E3/UL (ref 0–0.2)
BASOPHILS NFR BLD AUTO: 1 %
BILIRUB SERPL-MCNC: 0.5 MG/DL (ref 0–1.2)
BUN SERPL-MCNC: 15 MG/DL (ref 8–27)
BUN/CREAT SERPL: 17 (ref 12–28)
CALCIUM SERPL-MCNC: 9.7 MG/DL (ref 8.7–10.3)
CHLORIDE SERPL-SCNC: 100 MMOL/L (ref 96–106)
CHOLEST SERPL-MCNC: 303 MG/DL (ref 100–199)
CO2 SERPL-SCNC: 25 MMOL/L (ref 20–29)
COMMENT, 011824: ABNORMAL
CREAT SERPL-MCNC: 0.86 MG/DL (ref 0.57–1)
EOSINOPHIL # BLD AUTO: 0.1 X10E3/UL (ref 0–0.4)
EOSINOPHIL NFR BLD AUTO: 3 %
ERYTHROCYTE [DISTWIDTH] IN BLOOD BY AUTOMATED COUNT: 13.6 % (ref 12.3–15.4)
GLOBULIN SER CALC-MCNC: 2.4 G/DL (ref 1.5–4.5)
GLUCOSE SERPL-MCNC: 94 MG/DL (ref 65–99)
HCT VFR BLD AUTO: 38.7 % (ref 34–46.6)
HDLC SERPL-MCNC: 77 MG/DL
HGB BLD-MCNC: 13.2 G/DL (ref 11.1–15.9)
IMM GRANULOCYTES # BLD: 0 X10E3/UL (ref 0–0.1)
IMM GRANULOCYTES NFR BLD: 0 %
LDLC SERPL CALC-MCNC: 194 MG/DL (ref 0–99)
LYMPHOCYTES # BLD AUTO: 1 X10E3/UL (ref 0.7–3.1)
LYMPHOCYTES NFR BLD AUTO: 33 %
MCH RBC QN AUTO: 34.3 PG (ref 26.6–33)
MCHC RBC AUTO-ENTMCNC: 34.1 G/DL (ref 31.5–35.7)
MCV RBC AUTO: 101 FL (ref 79–97)
MONOCYTES # BLD AUTO: 0.4 X10E3/UL (ref 0.1–0.9)
MONOCYTES NFR BLD AUTO: 11 %
NEUTROPHILS # BLD AUTO: 1.6 X10E3/UL (ref 1.4–7)
NEUTROPHILS NFR BLD AUTO: 52 %
PLATELET # BLD AUTO: 248 X10E3/UL (ref 150–379)
POTASSIUM SERPL-SCNC: 4.4 MMOL/L (ref 3.5–5.2)
PROT SERPL-MCNC: 6.9 G/DL (ref 6–8.5)
RBC # BLD AUTO: 3.85 X10E6/UL (ref 3.77–5.28)
SODIUM SERPL-SCNC: 142 MMOL/L (ref 134–144)
TRIGL SERPL-MCNC: 161 MG/DL (ref 0–149)
VLDLC SERPL CALC-MCNC: 32 MG/DL (ref 5–40)
WBC # BLD AUTO: 3.1 X10E3/UL (ref 3.4–10.8)

## 2018-11-29 NOTE — PROGRESS NOTES
Chief Complaint   Patient presents with   [facility] Annual Wellness Visit     Patient states she is here for her AWV physical.  Patient given medical release form for recent eye exam.

## 2018-11-29 NOTE — PATIENT INSTRUCTIONS
Medicare Wellness Visit, Female     The best way to live healthy is to have a lifestyle where you eat a well-balanced diet, exercise regularly, limit alcohol use, and quit all forms of tobacco/nicotine, if applicable. Regular preventive services are another way to keep healthy. Preventive services (vaccines, screening tests, monitoring & exams) can help personalize your care plan, which helps you manage your own care. Screening tests can find health problems at the earliest stages, when they are easiest to treat. [facility] follows the current, evidence-based guidelines published by the [facility] Ilan Alisa ([facility]) when recommending preventive services for our patients. Because we follow these guidelines, sometimes recommendations change over time as research supports it. (For example, mammograms used to be recommended annually. Even though Medicare will still pay for an annual mammogram, the newer guidelines recommend a mammogram every two years for women of average risk.)  Of course, you and your doctor may decide to screen more often for some diseases, based on your risk and your health status. Preventive services for you include:  - Medicare offers their members a free annual wellness visit, which is time for you and your primary care provider to discuss and plan for your preventive service needs. Take advantage of this benefit every year!  -All adults over the age of 72 should receive the recommended pneumonia vaccines. Current USPSTF guidelines recommend a series of two vaccines for the best pneumonia protection.   -All adults should have a flu vaccine yearly and a tetanus vaccine every 10 years. All adults age 61 and older should receive a shingles vaccine once in their lifetime.    -A bone mass density test is recommended when a woman turns 65 to screen for osteoporosis. This test is only recommended one time, as a screening.  Some providers will use this same test as a disease monitoring tool if you already have osteoporosis. -All adults age 38-68 who are overweight should have a diabetes screening test once every three years.   -Other screening tests and preventive services for persons with diabetes include: an eye exam to screen for diabetic retinopathy, a kidney function test, a foot exam, and stricter control over your cholesterol.   -Cardiovascular screening for adults with routine risk involves an electrocardiogram (ECG) at intervals determined by your doctor.   -Colorectal cancer screenings should be done for adults age 54-65 with no increased risk factors for colorectal cancer. There are a number of acceptable methods of screening for this type of cancer. Each test has its own benefits and drawbacks. Discuss with your doctor what is most appropriate for you during your annual wellness visit. The different tests include: colonoscopy (considered the best screening method), a fecal occult blood test, a fecal DNA test, and sigmoidoscopy. -Breast cancer screenings are recommended every other year for women of normal risk, age 54-69.  -Cervical cancer screenings for women over age 72 are only recommended with certain risk factors.   -All adults born between [facility] should be screened once for Hepatitis C.      Here is a list of your current Health Maintenance items (your personalized list of preventive services) with a due date:  Health Maintenance Due   Topic Date Due    Shingles Vaccine (1 of 2) 04/18/1993    Glaucoma Screening   05/02/2018    Pneumococcal Vaccine (2 of 2 - PPSV23) 11/09/2018

## 2018-11-29 NOTE — PROGRESS NOTES
This is the Subsequent Medicare Annual Wellness Exam, performed 12 months or more after the Initial AWV or the last Subsequent AWV    I have reviewed the patient's medical history in detail and updated the computerized patient record. History     Past Medical History:   Diagnosis Date    Allergic rhinitis, cause unspecified 12/17/2009    Anemia NEC     Benign neoplasm of colon     Hypercholesterolemia       Past Surgical History:   Procedure Laterality Date    BREAST SURGERY PROCEDURE UNLISTED      left & rt. bx - benign    COLONOSCOPY N/A 11/30/2017    COLONOSCOPY performed by Gladys Arcos MD at [facility]. [address], COLON, DIAGNOSTIC  7/06    due repeat 2011 per note    HX BREAST BIOPSY Left 1990's    benign    HX BREAST BIOPSY Right 1980's    benign    HX BUNIONECTOMY      HX HYSTERECTOMY      partial    HX ORTHOPAEDIC      ACL - right knee    HX ORTHOPAEDIC  2014    right foot surgery    HX ORTHOPAEDIC  2015    left thumb surgery    HX TONSILLECTOMY      US GUIDED CORE BREAST BIOPSY Left 1990's    benign     Current Outpatient Medications   Medication Sig Dispense Refill    aspirin delayed-release 81 mg tablet Take 1 Tab by mouth daily. 30 Tab 11    azelastine (ASTELIN) 137 mcg nasal spray as needed.  CHOLECALCIFEROL, VITAMIN D3, (VITAMIN D3 PO) Take 400 Units by mouth daily.  PV W-O MARIEL/FERROUS FUMARATE/FA (M-VIT PO) Take  by mouth. Takes one multivitamin per day.  CALCIUM CARBONATE (CALCIUM 300 PO) Take 300 mg by mouth daily.        Allergies   Allergen Reactions    Lipitor [Atorvastatin] Myalgia    Oxycodone Other (comments)     Unable to walk, \"completely knocks me out\"    Sulfa (Sulfonamide Antibiotics) Hives    Zocor [Simvastatin] Myalgia     Family History   Problem Relation Age of Onset    Cancer Mother         breast    Breast Cancer Mother 48    Heart Disease Father     Breast Cancer Maternal Grandmother      Social History     Tobacco Use    Smoking status: Former Smoker     Types: Cigarettes     Last attempt to quit: 1987     Years since quittin.9    Smokeless tobacco: Never Used    Tobacco comment: former cigarette smoker   Substance Use Topics    Alcohol use: Yes     Alcohol/week: 1.2 oz     Types: 1 Standard drinks or equivalent, 1 Glasses of wine per week     Patient Active Problem List   Diagnosis Code    Osteopenia M85.80    Allergic rhinitis J30.9    Hypercholesteremia E78.00       Depression Risk Factor Screening:     PHQ over the last two weeks 2018   Little interest or pleasure in doing things Not at all   Feeling down, depressed, irritable, or hopeless Not at all   Total Score PHQ 2 0     Alcohol Risk Factor Screening:   Reviewed and not at risk. Functional Ability and Level of Safety:   Hearing Loss  Hearing is good. Activities of Daily Living  The home contains: no safety equipment. Patient does total self care    Fall Risk  Fall Risk Assessment, last 12 mths 2018   Able to walk? Yes   Fall in past 12 months? No       Abuse Screen  Patient is not abused    Cognitive Screening   Evaluation of Cognitive Function:  Has your family/caregiver stated any concerns about your memory: no  Normal    Patient Care Team   Patient Care Team:  Carrie Smith MD as PCP - General    Assessment/Plan   Education and counseling provided:  Are appropriate based on today's review and evaluation    Health Maintenance Due   Topic Date Due    Shingrix Vaccine Age 49> (1 of 2) 1993    GLAUCOMA SCREENING Q2Y  2018    Pneumococcal 65+ Low/Medium Risk (2 of 2 - PPSV23) 2018     HPI:  Sebas Wood is  also here today today for follow up: Hypercholesterolemia and osteopenia. Hypercholesterolemia: She is following a good low-fat low-cholesterol diet. Her calcium cardiac score is 0 in the past.  She had been on statins in the past but was not able to tolerate and at this point has not been felt to be necessary.  She is also seeing her cardiologist. Reviewed recent cholesterol higher and was checked just after thanksgiving holiday. Osteopenia:  Taking calcium in diet and supplement and vit D,  last bone density reviewed. No falls. Exercise: resistance, pickle ball and tennis. Current Outpatient Medications   Medication Sig Dispense Refill    aspirin delayed-release 81 mg tablet Take 1 Tab by mouth daily. 30 Tab 11    azelastine (ASTELIN) 137 mcg nasal spray as needed.  CHOLECALCIFEROL, VITAMIN D3, (VITAMIN D3 PO) Take 400 Units by mouth daily.  PV W-O MARIEL/FERROUS FUMARATE/FA (M-VIT PO) Take  by mouth. Takes one multivitamin per day.  CALCIUM CARBONATE (CALCIUM 300 PO) Take 300 mg by mouth daily. Allergies   Allergen Reactions    Lipitor [Atorvastatin] Myalgia    Oxycodone Other (comments)     Unable to walk, \"completely knocks me out\"    Sulfa (Sulfonamide Antibiotics) Hives    Zocor [Simvastatin] Myalgia     Social History     Tobacco Use    Smoking status: Former Smoker     Types: Cigarettes     Last attempt to quit: 1987     Years since quittin.9    Smokeless tobacco: Never Used    Tobacco comment: former cigarette smoker   Substance Use Topics    Alcohol use: Yes     Alcohol/week: 0.5 oz     Types: 1 Standard drinks or equivalent per week         Review of Systems   Constitutional: Negative for chills, fever and malaise/fatigue. HENT: Negative for congestion and sore throat. Eyes: Negative for blurred vision and double vision. Respiratory: Negative for cough, shortness of breath and wheezing. Cardiovascular: Negative for chest pain, palpitations and leg swelling. Gastrointestinal: Negative for abdominal pain, blood in stool, constipation, diarrhea, heartburn, nausea and vomiting. Genitourinary: Negative for dysuria, frequency and urgency. Musculoskeletal: Negative for back pain, joint pain and myalgias. Skin: Negative for rash.    Neurological: Negative for dizziness, sensory change, focal weakness and headaches. Psychiatric/Behavioral: Negative for depression. The patient is not nervous/anxious and does not have insomnia. Physical Exam   Constitutional: She appears well-developed. No distress. HENT:   Head: Normocephalic and atraumatic. Right Ear: Tympanic membrane and ear canal normal.   Left Ear: Tympanic membrane and ear canal normal.   Nose: Nose normal.   Mouth/Throat: Oropharynx is clear and moist.   Eyes: Conjunctivae and EOM are normal. Pupils are equal, round, and reactive to light. Neck: Normal range of motion. No thyromegaly present. Cardiovascular: Normal rate, regular rhythm, normal heart sounds and intact distal pulses. No murmur heard. Pulmonary/Chest: Effort normal and breath sounds normal.   Abdominal: Soft. Bowel sounds are normal. She exhibits no mass. There is no tenderness. Musculoskeletal: She exhibits no edema. Lymphadenopathy:     She has no cervical adenopathy. Neurological: She has normal strength. No cranial nerve deficit or sensory deficit. Skin: No rash noted. Psychiatric: She has a normal mood and affect. Nursing note and vitals reviewed. Visit Vitals  /82   Pulse 88   Temp 98.1 °F (36.7 °C) (Oral)   Resp 16   Ht 5' 4\" (1.626 m)   Wt 133 lb 9.6 oz (60.6 kg)   SpO2 98%   BMI 22.93 kg/m²       Assessment & Plan:    ICD-10-CM ICD-9-CM    1. Medicare annual wellness visit, subsequent  Health maintenance reviewed and updated with patient today at visit. Z00.00 V70.0    2. Screening for alcoholism Z13.39 V79.1 MA ANNUAL ALCOHOL SCREEN 15 MIN   3. Screening for depression Z13.31 V79.0 DEPRESSION SCREEN ANNUAL   4. Encounter for immunization Z23 V03.89 PNEUMOCOCCAL POLYSACCHARIDE VACCINE, 23-VALENT, ADULT OR IMMUNOSUPPRESSED PT DOSE,   5. Visit for screening mammogram Z12.31 V76.12 SOFI 3D JAQUELINE W MAMMO BI SCREENING INCL CAD   6.  Elevated BP without diagnosis of hypertension  Counseled to send blood pressures for review in 2 weeks. Regarding lifestyle changes. Counseled regarding new recommendations on blood pressure goes. Reevaluate in 6 months. R03.0 796.2    7. Hypercholesteremia  Much higher than last check may be related to recent dietary changes with 3 months E78.00 272.0    8. Osteopenia of multiple sites  Continue with calcium primarily in diet if able vitamin D supplementation and exercise. Reviewed bone density with her. M85.89 733.90      Orders Placed This Encounter    Depression Screen Annual    SOFI 3D JAQUELINE W MAMMO BI SCREENING INCL CAD    PNEUMOCOCCAL POLYSACCHARIDE VACCINE, 23-VALENT, ADULT OR IMMUNOSUPPRESSED PT DOSE,    Annual  Alcohol Screen 15 min ()         Follow-up Disposition:  Return in about 6 months (around 5/29/2019). Advised her to call back or return to office if symptoms worsen/change/persist.  Discussed expected course/resolution/complications of diagnosis in detail with patient. Medication risks/benefits/costs/interactions/alternatives discussed with patient. She was given an after visit summary which includes diagnoses, current medications, & vitals. She expressed understanding with the diagnosis and plan.

## 2018-12-21 ENCOUNTER — HOSPITAL ENCOUNTER (OUTPATIENT)
Dept: MAMMOGRAPHY | Age: 73
Discharge: HOME | End: 2018-12-21
Attending: INTERNAL MEDICINE
Payer: MEDICARE

## 2018-12-21 DIAGNOSIS — Z12.31 VISIT FOR SCREENING MAMMOGRAM: ICD-10-CM

## 2018-12-21 PROCEDURE — 77063 BREAST TOMOSYNTHESIS BI: CPT

## 2019-01-07 RX ORDER — VALACYCLOVIR HYDROCHLORIDE 1 G/1
2000 TABLET, FILM COATED ORAL 2 TIMES DAILY
Qty: 8 TAB | Refills: 0 | Status: SHIPPED | OUTPATIENT
Start: 2019-01-07 | End: 2020-10-02 | Stop reason: SDUPTHER

## 2019-02-05 ENCOUNTER — OFFICE VISIT (OUTPATIENT)
Dept: INTERNAL MEDICINE CLINIC | Age: 74
End: 2019-02-05

## 2019-02-05 VITALS
TEMPERATURE: 97.9 F | WEIGHT: 134.8 LBS | HEIGHT: 64 IN | OXYGEN SATURATION: 94 % | RESPIRATION RATE: 16 BRPM | BODY MASS INDEX: 23.01 KG/M2 | DIASTOLIC BLOOD PRESSURE: 90 MMHG | SYSTOLIC BLOOD PRESSURE: 140 MMHG | HEART RATE: 89 BPM

## 2019-02-05 DIAGNOSIS — R82.998 DARK URINE: Primary | ICD-10-CM

## 2019-02-05 LAB
BILIRUB UR QL STRIP: NEGATIVE
GLUCOSE UR-MCNC: NEGATIVE MG/DL
KETONES P FAST UR STRIP-MCNC: NEGATIVE MG/DL
PH UR STRIP: 7 [PH] (ref 4.6–8)
PROT UR QL STRIP: NEGATIVE
SP GR UR STRIP: 1.01 (ref 1–1.03)
UA UROBILINOGEN AMB POC: NORMAL (ref 0.2–1)
URINALYSIS CLARITY POC: CLEAR
URINALYSIS COLOR POC: YELLOW
URINE BLOOD POC: NEGATIVE
URINE LEUKOCYTES POC: NORMAL
URINE NITRITES POC: NEGATIVE

## 2019-02-05 RX ORDER — BETAMETHASONE VALERATE 1.2 MG/G
AEROSOL, FOAM TOPICAL
Refills: 1 | COMMUNITY
Start: 2019-01-15

## 2019-02-05 NOTE — PROGRESS NOTES
Chief Complaint   Patient presents with    Other     dark urine     Patient states she is here with concerns that her urine is dark. Patient denies burning with urination, and blood in urine. Patient states she was treated at [facility] on 1/31/19 for vaginal infection.

## 2019-02-05 NOTE — PROGRESS NOTES
HPI:  Cece Gonzales is a 74y.o. year old female who returns to clinic today for an acute visit:   She reports feeling that her urine is darker than it should be. She has noted the symptoms for the past 2 weeks approximately. She was seen at an urgent care for some vaginal discharge on 2019 and was treated with Diflucan and metronidazole. The vaginal discharge has resolved. Urine culture 19 neg. Reviewed urgent care visit and lab work. Today she denies any urinary urgency, hematuria burning or frequency. Current Outpatient Medications   Medication Sig Dispense Refill    betamethasone valerate (LUXIQ) 0.12 % topical foam MARGO FOAM EXT TO THE SCALP BID FOR RASH  1    aspirin delayed-release 81 mg tablet Take 1 Tab by mouth daily. 30 Tab 11    CHOLECALCIFEROL, VITAMIN D3, (VITAMIN D3 PO) Take 400 Units by mouth daily.  PV W-O MARIEL/FERROUS FUMARATE/FA (M-VIT PO) Take  by mouth. Takes one multivitamin per day.  CALCIUM CARBONATE (CALCIUM 300 PO) Take 300 mg by mouth daily.  azelastine (ASTELIN) 137 mcg nasal spray as needed. Allergies   Allergen Reactions    Lipitor [Atorvastatin] Myalgia    Oxycodone Other (comments)     Unable to walk, \"completely knocks me out\"    Sulfa (Sulfonamide Antibiotics) Hives    Zocor [Simvastatin] Myalgia     Social History     Tobacco Use    Smoking status: Former Smoker     Types: Cigarettes     Last attempt to quit: 1987     Years since quittin.1    Smokeless tobacco: Never Used    Tobacco comment: former cigarette smoker   Substance Use Topics    Alcohol use: Yes     Alcohol/week: 1.2 oz     Types: 1 Glasses of wine, 1 Standard drinks or equivalent per week         Review of Systems   Constitutional: Negative for fever. Gastrointestinal: Negative for abdominal pain. Genitourinary: Negative for dysuria, flank pain, frequency, hematuria and urgency. Physical Exam   Constitutional: She appears well-developed. No distress. HENT:   Head: Normocephalic and atraumatic. Cardiovascular: Normal rate and regular rhythm. Pulmonary/Chest: Effort normal and breath sounds normal.   Abdominal: Soft. Bowel sounds are normal. She exhibits no distension and no mass. There is no tenderness. There is no rebound and no guarding. Nursing note and vitals reviewed. Assessment & Plan:    ICD-10-CM ICD-9-CM    1. Dark urine R82.998 791.9 AMB POC URINALYSIS DIP STICK AUTO W/O MICRO      UA/M W/RFLX CULTURE, ROUTINE   Urine dip negative for evidence of infection. Will send for urinalysis reflex to culture. Encouraged her to increase fluid intake. Follow-up if any acute worsening. Advised her to call back or return to office if symptoms worsen/change/persist.  Discussed expected course/resolution/complications of diagnosis in detail with patient. Medication risks/benefits/costs/interactions/alternatives discussed with patient. She was given an after visit summary which includes diagnoses, current medications, & vitals. She expressed understanding with the diagnosis and plan.

## 2019-02-07 ENCOUNTER — TELEPHONE (OUTPATIENT)
Dept: INTERNAL MEDICINE CLINIC | Age: 74
End: 2019-02-07

## 2019-02-07 LAB
APPEARANCE UR: CLEAR
BACTERIA #/AREA URNS HPF: NORMAL /[HPF]
BACTERIA UR CULT: NORMAL
BILIRUB UR QL STRIP: NEGATIVE
CASTS URNS QL MICRO: NORMAL /LPF
COLOR UR: YELLOW
EPI CELLS #/AREA URNS HPF: NORMAL /HPF
GLUCOSE UR QL: NEGATIVE
HGB UR QL STRIP: NEGATIVE
KETONES UR QL STRIP: NEGATIVE
LEUKOCYTE ESTERASE UR QL STRIP: ABNORMAL
MICRO URNS: ABNORMAL
MUCOUS THREADS URNS QL MICRO: PRESENT
NITRITE UR QL STRIP: NEGATIVE
PH UR STRIP: 6.5 [PH] (ref 5–7.5)
PROT UR QL STRIP: NEGATIVE
RBC #/AREA URNS HPF: NORMAL /HPF
SP GR UR: 1.01 (ref 1–1.03)
URINALYSIS REFLEX, 377202: ABNORMAL
UROBILINOGEN UR STRIP-MCNC: 0.2 MG/DL (ref 0.2–1)
WBC #/AREA URNS HPF: NORMAL /HPF

## 2019-02-07 NOTE — TELEPHONE ENCOUNTER
Patient was seen on 2/5/19 by Dr. Ana Wolf for dark urine. Patient reports vaginal discharge is still present and now it has a small amount of blood in it. Denies any abdominal pain or discomfort. She does not have a GYN, would like recommendation from Dr. Ana Wolf. Please advise.

## 2019-02-07 NOTE — TELEPHONE ENCOUNTER
Patient notified per Dr. James Machado needs to see GYN - advised will send her contact information via [facility].

## 2019-05-29 ENCOUNTER — TELEPHONE (OUTPATIENT)
Dept: INTERNAL MEDICINE CLINIC | Age: 74
End: 2019-05-29

## 2019-05-29 DIAGNOSIS — R03.0 ELEVATED BP WITHOUT DIAGNOSIS OF HYPERTENSION: ICD-10-CM

## 2019-05-29 DIAGNOSIS — E78.00 HYPERCHOLESTEREMIA: Primary | ICD-10-CM

## 2019-05-30 ENCOUNTER — OFFICE VISIT (OUTPATIENT)
Dept: INTERNAL MEDICINE CLINIC | Age: 74
End: 2019-05-30

## 2019-05-30 VITALS
SYSTOLIC BLOOD PRESSURE: 128 MMHG | TEMPERATURE: 97.8 F | DIASTOLIC BLOOD PRESSURE: 80 MMHG | HEART RATE: 70 BPM | BODY MASS INDEX: 22.94 KG/M2 | HEIGHT: 64 IN | OXYGEN SATURATION: 96 % | RESPIRATION RATE: 16 BRPM | WEIGHT: 134.4 LBS

## 2019-05-30 DIAGNOSIS — S39.012A STRAIN OF LUMBAR REGION, INITIAL ENCOUNTER: ICD-10-CM

## 2019-05-30 DIAGNOSIS — M85.89 OSTEOPENIA OF MULTIPLE SITES: ICD-10-CM

## 2019-05-30 DIAGNOSIS — Z78.0 POSTMENOPAUSAL: ICD-10-CM

## 2019-05-30 DIAGNOSIS — E78.00 HYPERCHOLESTEREMIA: Primary | ICD-10-CM

## 2019-05-30 NOTE — PATIENT INSTRUCTIONS
Back Strain: Care Instructions  Overview       Low Back Pain: Exercises  Your Care Instructions  Here are some examples of typical rehabilitation exercises for your condition. Start each exercise slowly. Ease off the exercise if you start to have pain. Your doctor or physical therapist will tell you when you can start these exercises and which ones will work best for you. How to do the exercises  Press-up    1. Lie on your stomach, supporting your body with your forearms. 2. Press your elbows down into the floor to raise your upper back. As you do this, relax your stomach muscles and allow your back to arch without using your back muscles. As your press up, do not let your hips or pelvis come off the floor. 3. Hold for 15 to 30 seconds, then relax. 4. Repeat 2 to 4 times. Alternate arm and leg (bird dog) exercise    1. Start on the floor, on your hands and knees. 2. Tighten your belly muscles. 3. Raise one leg off the floor, and hold it straight out behind you. Be careful not to let your hip drop down, because that will twist your trunk. 4. Hold for about 6 seconds, then lower your leg and switch to the other leg. 5. Repeat 8 to 12 times on each leg. 6. Over time, work up to holding for 10 to 30 seconds each time. 7. If you feel stable and secure with your leg raised, try raising the opposite arm straight out in front of you at the same time. Knee-to-chest exercise    1. Lie on your back with your knees bent and your feet flat on the floor. 2. Bring one knee to your chest, keeping the other foot flat on the floor (or keeping the other leg straight, whichever feels better on your lower back). 3. Keep your lower back pressed to the floor. Hold for at least 15 to 30 seconds. 4. Relax, and lower the knee to the starting position. 5. Repeat with the other leg. Repeat 2 to 4 times with each leg.   6. To get more stretch, put your other leg flat on the floor while pulling your knee to your chest.    Curl-ups    1. Lie on the floor on your back with your knees bent at a 90-degree angle. Your feet should be flat on the floor, about 12 inches from your buttocks. 2. Cross your arms over your chest. If this bothers your neck, try putting your hands behind your neck (not your head), with your elbows spread apart. 3. Slowly tighten your belly muscles and raise your shoulder blades off the floor. 4. Keep your head in line with your body, and do not press your chin to your chest.  5. Hold this position for 1 or 2 seconds, then slowly lower yourself back down to the floor. 6. Repeat 8 to 12 times. Pelvic tilt exercise    1. Lie on your back with your knees bent. 2. \"Brace\" your stomach. This means to tighten your muscles by pulling in and imagining your belly button moving toward your spine. You should feel like your back is pressing to the floor and your hips and pelvis are rocking back. 3. Hold for about 6 seconds while you breathe smoothly. 4. Repeat 8 to 12 times. Heel dig bridging    1. Lie on your back with both knees bent and your ankles bent so that only your heels are digging into the floor. Your knees should be bent about 90 degrees. 2. Then push your heels into the floor, squeeze your buttocks, and lift your hips off the floor until your shoulders, hips, and knees are all in a straight line. 3. Hold for about 6 seconds as you continue to breathe normally, and then slowly lower your hips back down to the floor and rest for up to 10 seconds. 4. Do 8 to 12 repetitions. Hamstring stretch in doorway    1. Lie on your back in a doorway, with one leg through the open door. 2. Slide your leg up the wall to straighten your knee. You should feel a gentle stretch down the back of your leg. 3. Hold the stretch for at least 15 to 30 seconds. Do not arch your back, point your toes, or bend either knee. Keep one heel touching the floor and the other heel touching the wall.   4. Repeat with your other leg. 5. Do 2 to 4 times for each leg. Hip flexor stretch    1. Kneel on the floor with one knee bent and one leg behind you. Place your forward knee over your foot. Keep your other knee touching the floor. 2. Slowly push your hips forward until you feel a stretch in the upper thigh of your rear leg. 3. Hold the stretch for at least 15 to 30 seconds. Repeat with your other leg. 4. Do 2 to 4 times on each side. Wall sit    1. Stand with your back 10 to 12 inches away from a wall. 2. Lean into the wall until your back is flat against it. 3. Slowly slide down until your knees are slightly bent, pressing your lower back into the wall. 4. Hold for about 6 seconds, then slide back up the wall. 5. Repeat 8 to 12 times. Follow-up care is a key part of your treatment and safety. Be sure to make and go to all appointments, and call your doctor if you are having problems. It's also a good idea to know your test results and keep a list of the medicines you take. Where can you learn more? Go to http://eldonZumboxdileep.info/. Enter U455 in the search box to learn more about \"Low Back Pain: Exercises. \"  Current as of: September 20, 2018  Content Version: 11.9  © 1137-4667 [employer]. Care instructions adapted under license by [employer] (which disclaims liability or warranty for this information). If you have questions about a medical condition or this instruction, always ask your healthcare professional. [place] [address] any warranty or liability for your use of this information. A back strain happens when you overstretch, or pull, a muscle in your back. You may hurt your back in an accident or when you exercise or lift something. Sometimes you may not know how you hurt your back. Most back pain will get better with rest and time.  You can take care of yourself at home to help your back heal.  Follow-up care is a key part of your treatment and safety. Be sure to make and go to all appointments, and call your doctor if you are having problems. It's also a good idea to know your test results and keep a list of the medicines you take. How can you care for yourself at home? · Try to stay as active as you can, but stop or reduce any activity that causes pain. · Put ice or a cold pack on the sore muscle for 10 to 20 minutes at a time to stop swelling. Try this every 1 to 2 hours for 3 days (when you are awake) or until the swelling goes down. Put a thin cloth between the ice pack and your skin. · After 2 or 3 days, apply a heating pad on low or a warm cloth to your back. Some doctors suggest that you go back and forth between hot and cold treatments. · Take pain medicines exactly as directed. ? If the doctor gave you a prescription medicine for pain, take it as prescribed. ? If you are not taking a prescription pain medicine, ask your doctor if you can take an over-the-counter medicine. · Try sleeping on your side with a pillow between your legs. Or put a pillow under your knees when you lie on your back. These measures can ease pain in your lower back. · Return to your usual level of activity slowly. When should you call for help? Call 911 anytime you think you may need emergency care. For example, call if:    · You are unable to move a leg at all.   [facility] your doctor now or seek immediate medical care if:    · You have new or worse symptoms in your legs, belly, or buttocks. Symptoms may include:  ? Numbness or tingling. ? Weakness. ? Pain.     · You lose bladder or bowel control.    Watch closely for changes in your health, and be sure to contact your doctor if:    · You have a fever, lose weight, or don't feel well.     · You are not getting better as expected. Where can you learn more? Go to http://eldon-dileep.info/. Enter U852 in the search box to learn more about \"Back Strain: Care Instructions. \"  Current as of: September 20, 2018  Content Version: 11.9  © 5295-2274 [employer]. Care instructions adapted under license by [employer] (which disclaims liability or warranty for this information). If you have questions about a medical condition or this instruction, always ask your healthcare professional. [address] any warranty or liability for your use of this information.

## 2019-05-30 NOTE — PROGRESS NOTES
HPI:  Estefania Hernandez is a 66y.o. year old female who returns to clinic today for follow up:   1. Hypercholesterolemia: Has failed simvastatin and lipitor as meds caused significant muscle aches. eating a pretty healthy healthy diet. Exercise: pickle ball and tennis and walking. 2016 CT coronary calcium 0.   2. Notes back pain left lower started yesterday and improved today. No radiation down her leg. 3. Osteopenia:  Taking calcium and vit D,  last bone density reviewed. No falls. Current Outpatient Medications   Medication Sig Dispense Refill    aspirin delayed-release 81 mg tablet Take 1 Tab by mouth daily. 30 Tab 11    CHOLECALCIFEROL, VITAMIN D3, (VITAMIN D3 PO) Take 400 Units by mouth daily.  PV W-O MARIEL/FERROUS FUMARATE/FA (M-VIT PO) Take  by mouth. Takes one multivitamin per day.  CALCIUM CARBONATE (CALCIUM 300 PO) Take 300 mg by mouth daily.  betamethasone valerate (LUXIQ) 0.12 % topical foam MARGO FOAM EXT TO THE SCALP BID FOR RASH  1    azelastine (ASTELIN) 137 mcg nasal spray as needed. Allergies   Allergen Reactions    Lipitor [Atorvastatin] Myalgia    Oxycodone Other (comments)     Unable to walk, \"completely knocks me out\"    Sulfa (Sulfonamide Antibiotics) Hives    Zocor [Simvastatin] Myalgia     Social History     Tobacco Use    Smoking status: Former Smoker     Types: Cigarettes     Last attempt to quit: 1987     Years since quittin.4    Smokeless tobacco: Never Used    Tobacco comment: former cigarette smoker   Substance Use Topics    Alcohol use: Yes     Alcohol/week: 1.2 oz     Types: 1 Glasses of wine, 1 Standard drinks or equivalent per week         Review of Systems   Respiratory: Negative for shortness of breath. Cardiovascular: Negative for chest pain and leg swelling. Gastrointestinal: Negative for abdominal pain and heartburn. Neurological: Negative for sensory change and focal weakness.      Physical Exam   Constitutional: She appears well-developed. No distress. HENT:   Head: Normocephalic and atraumatic. Cardiovascular: Normal rate, regular rhythm and intact distal pulses. Pulmonary/Chest: Effort normal and breath sounds normal. She has no wheezes. Abdominal: Soft. Bowel sounds are normal. She exhibits no distension and no mass. There is no tenderness. Musculoskeletal: She exhibits no edema. Lumbar back: She exhibits decreased range of motion, tenderness (Left paralumbar) and spasm. She exhibits no deformity. Psychiatric: She has a normal mood and affect. Nursing note and vitals reviewed. Visit Vitals  /80 (BP 1 Location: Right arm)   Pulse 70   Temp 97.8 °F (36.6 °C) (Oral)   Resp 16   Ht 5' 4\" (1.626 m)   Wt 134 lb 6.4 oz (61 kg)   SpO2 96%   BMI 23.07 kg/m²       Assessment & Plan:  Encounter Diagnoses   Name Primary?  Hypercholesteremia   Continue with management with lifestyle. Check lipid panel and if LDL remains elevated start Zetia if lipids remain significantly elevated. She is statin intolerant. Her coronary calcium score of 0 is reassuring. Yes    Osteopenia of multiple sites  Continue calcium, vitamin D     Strain of lumbar region, initial encounter  Given exercises, ice/heat, rest, ibuprofen or Tylenol as needed.  Postmenopausal    Stop aspirin therapy as was being used to prevent for prevention  . Orders Placed This Encounter    DEXA BONE DENSITY STUDY AXIAL    varicella-zoster recombinant, PF, (SHINGRIX, PF,) 50 mcg/0.5 mL susr injection   Follow-up in 6 months or earlier if any problems. Advised her to call back or return to office if symptoms worsen/change/persist.  Discussed expected course/resolution/complications of diagnosis in detail with patient. Medication risks/benefits/costs/interactions/alternatives discussed with patient. She was given an after visit summary which includes diagnoses, current medications, & vitals.   She expressed understanding with the diagnosis and plan.

## 2019-06-02 LAB
ALBUMIN SERPL-MCNC: 4.7 G/DL (ref 3.5–4.8)
ALBUMIN/GLOB SERPL: 2 {RATIO} (ref 1.2–2.2)
ALP SERPL-CCNC: 91 IU/L (ref 39–117)
ALT SERPL-CCNC: 16 IU/L (ref 0–32)
APPEARANCE UR: CLEAR
AST SERPL-CCNC: 20 IU/L (ref 0–40)
BACTERIA #/AREA URNS HPF: ABNORMAL /[HPF]
BACTERIA UR CULT: NORMAL
BASOPHILS # BLD AUTO: 0 X10E3/UL (ref 0–0.2)
BASOPHILS NFR BLD AUTO: 1 %
BILIRUB SERPL-MCNC: 0.4 MG/DL (ref 0–1.2)
BILIRUB UR QL STRIP: NEGATIVE
BUN SERPL-MCNC: 21 MG/DL (ref 8–27)
BUN/CREAT SERPL: 22 (ref 12–28)
CALCIUM SERPL-MCNC: 9.4 MG/DL (ref 8.7–10.3)
CASTS URNS QL MICRO: ABNORMAL /LPF
CHLORIDE SERPL-SCNC: 100 MMOL/L (ref 96–106)
CHOLEST SERPL-MCNC: 290 MG/DL (ref 100–199)
CO2 SERPL-SCNC: 26 MMOL/L (ref 20–29)
COLOR UR: YELLOW
COMMENT, 011824: ABNORMAL
CREAT SERPL-MCNC: 0.96 MG/DL (ref 0.57–1)
EOSINOPHIL # BLD AUTO: 0.1 X10E3/UL (ref 0–0.4)
EOSINOPHIL NFR BLD AUTO: 3 %
EPI CELLS #/AREA URNS HPF: >10 /HPF (ref 0–10)
ERYTHROCYTE [DISTWIDTH] IN BLOOD BY AUTOMATED COUNT: 13.6 % (ref 12.3–15.4)
GLOBULIN SER CALC-MCNC: 2.4 G/DL (ref 1.5–4.5)
GLUCOSE SERPL-MCNC: 91 MG/DL (ref 65–99)
GLUCOSE UR QL: NEGATIVE
HCT VFR BLD AUTO: 40.9 % (ref 34–46.6)
HDLC SERPL-MCNC: 74 MG/DL
HGB BLD-MCNC: 12.8 G/DL (ref 11.1–15.9)
HGB UR QL STRIP: NEGATIVE
IMM GRANULOCYTES # BLD AUTO: 0 X10E3/UL (ref 0–0.1)
IMM GRANULOCYTES NFR BLD AUTO: 0 %
KETONES UR QL STRIP: NEGATIVE
LDLC SERPL CALC-MCNC: 201 MG/DL (ref 0–99)
LEUKOCYTE ESTERASE UR QL STRIP: ABNORMAL
LYMPHOCYTES # BLD AUTO: 1.1 X10E3/UL (ref 0.7–3.1)
LYMPHOCYTES NFR BLD AUTO: 35 %
MCH RBC QN AUTO: 32.3 PG (ref 26.6–33)
MCHC RBC AUTO-ENTMCNC: 31.3 G/DL (ref 31.5–35.7)
MCV RBC AUTO: 103 FL (ref 79–97)
MICRO URNS: ABNORMAL
MONOCYTES # BLD AUTO: 0.4 X10E3/UL (ref 0.1–0.9)
MONOCYTES NFR BLD AUTO: 13 %
MUCOUS THREADS URNS QL MICRO: PRESENT
NEUTROPHILS # BLD AUTO: 1.5 X10E3/UL (ref 1.4–7)
NEUTROPHILS NFR BLD AUTO: 48 %
NITRITE UR QL STRIP: NEGATIVE
PH UR STRIP: 6.5 [PH] (ref 5–7.5)
PLATELET # BLD AUTO: 254 X10E3/UL (ref 150–450)
POTASSIUM SERPL-SCNC: 4.4 MMOL/L (ref 3.5–5.2)
PROT SERPL-MCNC: 7.1 G/DL (ref 6–8.5)
PROT UR QL STRIP: NEGATIVE
RBC # BLD AUTO: 3.96 X10E6/UL (ref 3.77–5.28)
RBC #/AREA URNS HPF: ABNORMAL /HPF (ref 0–2)
SODIUM SERPL-SCNC: 141 MMOL/L (ref 134–144)
SP GR UR: 1.02 (ref 1–1.03)
TRIGL SERPL-MCNC: 77 MG/DL (ref 0–149)
URINALYSIS REFLEX , 377201: ABNORMAL
UROBILINOGEN UR STRIP-MCNC: 0.2 MG/DL (ref 0.2–1)
VLDLC SERPL CALC-MCNC: 15 MG/DL (ref 5–40)
WBC # BLD AUTO: 3.1 X10E3/UL (ref 3.4–10.8)
WBC #/AREA URNS HPF: ABNORMAL /HPF (ref 0–5)

## 2019-06-19 ENCOUNTER — HOSPITAL ENCOUNTER (OUTPATIENT)
Dept: MAMMOGRAPHY | Age: 74
Discharge: HOME | End: 2019-06-19
Attending: INTERNAL MEDICINE
Payer: MEDICARE

## 2019-06-19 DIAGNOSIS — Z78.0 POSTMENOPAUSAL: ICD-10-CM

## 2019-06-19 PROCEDURE — 77080 DXA BONE DENSITY AXIAL: CPT

## 2019-07-18 ENCOUNTER — APPOINTMENT (OUTPATIENT)
Dept: CT IMAGING | Age: 74
End: 2019-07-18
Attending: EMERGENCY MEDICINE
Payer: MEDICARE

## 2019-07-18 ENCOUNTER — HOSPITAL ENCOUNTER (OUTPATIENT)
Age: 74
Setting detail: OBSERVATION
Discharge: HOME | End: 2019-07-19
Attending: EMERGENCY MEDICINE | Admitting: INTERNAL MEDICINE
Payer: MEDICARE

## 2019-07-18 DIAGNOSIS — R42 VERTIGO: ICD-10-CM

## 2019-07-18 DIAGNOSIS — R42 DIZZINESS: Primary | ICD-10-CM

## 2019-07-18 DIAGNOSIS — R26.2 IMPAIRED AMBULATION: ICD-10-CM

## 2019-07-18 LAB
ALBUMIN SERPL-MCNC: 3.8 G/DL (ref 3.5–5)
ALBUMIN/GLOB SERPL: 1.1 {RATIO} (ref 1.1–2.2)
ALP SERPL-CCNC: 86 U/L (ref 45–117)
ALT SERPL-CCNC: 18 U/L (ref 12–78)
ANION GAP SERPL CALC-SCNC: 8 MMOL/L (ref 5–15)
APPEARANCE UR: CLEAR
AST SERPL-CCNC: 20 U/L (ref 15–37)
BACTERIA URNS QL MICRO: NEGATIVE /HPF
BASOPHILS # BLD: 0 K/UL (ref 0–0.1)
BASOPHILS NFR BLD: 0 % (ref 0–1)
BILIRUB SERPL-MCNC: 0.4 MG/DL (ref 0.2–1)
BILIRUB UR QL: NEGATIVE
BUN SERPL-MCNC: 10 MG/DL (ref 6–20)
BUN/CREAT SERPL: 12 (ref 12–20)
CALCIUM SERPL-MCNC: 8.9 MG/DL (ref 8.5–10.1)
CHLORIDE SERPL-SCNC: 100 MMOL/L (ref 97–108)
CK SERPL-CCNC: 69 U/L (ref 26–192)
CO2 SERPL-SCNC: 29 MMOL/L (ref 21–32)
COLOR UR: ABNORMAL
COMMENT, HOLDF: NORMAL
CREAT SERPL-MCNC: 0.82 MG/DL (ref 0.55–1.02)
DIFFERENTIAL METHOD BLD: ABNORMAL
EOSINOPHIL # BLD: 0 K/UL (ref 0–0.4)
EOSINOPHIL NFR BLD: 0 % (ref 0–7)
EPITH CASTS URNS QL MICRO: ABNORMAL /LPF
ERYTHROCYTE [DISTWIDTH] IN BLOOD BY AUTOMATED COUNT: 13 % (ref 11.5–14.5)
GLOBULIN SER CALC-MCNC: 3.5 G/DL (ref 2–4)
GLUCOSE SERPL-MCNC: 116 MG/DL (ref 65–100)
GLUCOSE UR STRIP.AUTO-MCNC: NEGATIVE MG/DL
HCT VFR BLD AUTO: 37.6 % (ref 35–47)
HGB BLD-MCNC: 12.5 G/DL (ref 11.5–16)
HGB UR QL STRIP: NEGATIVE
HYALINE CASTS URNS QL MICRO: ABNORMAL /LPF (ref 0–5)
IMM GRANULOCYTES # BLD AUTO: 0 K/UL (ref 0–0.04)
IMM GRANULOCYTES NFR BLD AUTO: 1 % (ref 0–0.5)
KETONES UR QL STRIP.AUTO: 15 MG/DL
LEUKOCYTE ESTERASE UR QL STRIP.AUTO: NEGATIVE
LYMPHOCYTES # BLD: 0.6 K/UL (ref 0.8–3.5)
LYMPHOCYTES NFR BLD: 15 % (ref 12–49)
MAGNESIUM SERPL-MCNC: 2.2 MG/DL (ref 1.6–2.4)
MCH RBC QN AUTO: 34 PG (ref 26–34)
MCHC RBC AUTO-ENTMCNC: 33.2 G/DL (ref 30–36.5)
MCV RBC AUTO: 102.2 FL (ref 80–99)
MONOCYTES # BLD: 0.3 K/UL (ref 0–1)
MONOCYTES NFR BLD: 6 % (ref 5–13)
NEUTS SEG # BLD: 3.3 K/UL (ref 1.8–8)
NEUTS SEG NFR BLD: 78 % (ref 32–75)
NITRITE UR QL STRIP.AUTO: NEGATIVE
NRBC # BLD: 0 K/UL (ref 0–0.01)
NRBC BLD-RTO: 0 PER 100 WBC
PH UR STRIP: 6.5 [PH] (ref 5–8)
PLATELET # BLD AUTO: 211 K/UL (ref 150–400)
PMV BLD AUTO: 9 FL (ref 8.9–12.9)
POTASSIUM SERPL-SCNC: 3.7 MMOL/L (ref 3.5–5.1)
PROT SERPL-MCNC: 7.3 G/DL (ref 6.4–8.2)
PROT UR STRIP-MCNC: NEGATIVE MG/DL
RBC # BLD AUTO: 3.68 M/UL (ref 3.8–5.2)
RBC #/AREA URNS HPF: ABNORMAL /HPF (ref 0–5)
RBC MORPH BLD: ABNORMAL
SAMPLES BEING HELD,HOLD: NORMAL
SODIUM SERPL-SCNC: 137 MMOL/L (ref 136–145)
SP GR UR REFRACTOMETRY: 1 (ref 1–1.03)
TROPONIN I SERPL-MCNC: <0.05 NG/ML
UR CULT HOLD, URHOLD: NORMAL
UROBILINOGEN UR QL STRIP.AUTO: 0.2 EU/DL (ref 0.2–1)
WBC # BLD AUTO: 4.2 K/UL (ref 3.6–11)
WBC URNS QL MICRO: ABNORMAL /HPF (ref 0–4)

## 2019-07-18 PROCEDURE — 99285 EMERGENCY DEPT VISIT HI MDM: CPT

## 2019-07-18 PROCEDURE — 70450 CT HEAD/BRAIN W/O DYE: CPT

## 2019-07-18 PROCEDURE — 96376 TX/PRO/DX INJ SAME DRUG ADON: CPT

## 2019-07-18 PROCEDURE — 99218 HC RM OBSERVATION: CPT

## 2019-07-18 PROCEDURE — 85025 COMPLETE CBC W/AUTO DIFF WBC: CPT

## 2019-07-18 PROCEDURE — 82550 ASSAY OF CK (CPK): CPT

## 2019-07-18 PROCEDURE — 81001 URINALYSIS AUTO W/SCOPE: CPT

## 2019-07-18 PROCEDURE — 83735 ASSAY OF MAGNESIUM: CPT

## 2019-07-18 PROCEDURE — 36415 COLL VENOUS BLD VENIPUNCTURE: CPT

## 2019-07-18 PROCEDURE — 74011250636 HC RX REV CODE- 250/636: Performed by: EMERGENCY MEDICINE

## 2019-07-18 PROCEDURE — 93005 ELECTROCARDIOGRAM TRACING: CPT

## 2019-07-18 PROCEDURE — 74011250636 HC RX REV CODE- 250/636: Performed by: INTERNAL MEDICINE

## 2019-07-18 PROCEDURE — 96374 THER/PROPH/DIAG INJ IV PUSH: CPT

## 2019-07-18 PROCEDURE — 80053 COMPREHEN METABOLIC PANEL: CPT

## 2019-07-18 PROCEDURE — 74011250637 HC RX REV CODE- 250/637: Performed by: NURSE PRACTITIONER

## 2019-07-18 PROCEDURE — 96361 HYDRATE IV INFUSION ADD-ON: CPT

## 2019-07-18 PROCEDURE — 77030032490 HC SLV COMPR SCD KNE COVD -B

## 2019-07-18 PROCEDURE — 94762 N-INVAS EAR/PLS OXIMTRY CONT: CPT

## 2019-07-18 PROCEDURE — 84484 ASSAY OF TROPONIN QUANT: CPT

## 2019-07-18 RX ORDER — MECLIZINE HCL 12.5 MG 12.5 MG/1
25 TABLET ORAL EVERY 6 HOURS
Status: DISCONTINUED | OUTPATIENT
Start: 2019-07-18 | End: 2019-07-19 | Stop reason: HOSPADM

## 2019-07-18 RX ORDER — ONDANSETRON 2 MG/ML
4 INJECTION INTRAMUSCULAR; INTRAVENOUS
Status: COMPLETED | OUTPATIENT
Start: 2019-07-18 | End: 2019-07-18

## 2019-07-18 RX ORDER — MECLIZINE HYDROCHLORIDE 25 MG/1
25 TABLET ORAL
Qty: 12 TAB | Refills: 0 | Status: ON HOLD | OUTPATIENT
Start: 2019-07-18 | End: 2019-07-19 | Stop reason: SDUPTHER

## 2019-07-18 RX ORDER — SODIUM CHLORIDE 0.9 % (FLUSH) 0.9 %
5-40 SYRINGE (ML) INJECTION EVERY 8 HOURS
Status: DISCONTINUED | OUTPATIENT
Start: 2019-07-18 | End: 2019-07-19 | Stop reason: HOSPADM

## 2019-07-18 RX ORDER — ONDANSETRON 2 MG/ML
4 INJECTION INTRAMUSCULAR; INTRAVENOUS
Status: DISCONTINUED | OUTPATIENT
Start: 2019-07-18 | End: 2019-07-19 | Stop reason: HOSPADM

## 2019-07-18 RX ORDER — AZELASTINE 1 MG/ML
1 SPRAY, METERED NASAL
Status: DISCONTINUED | OUTPATIENT
Start: 2019-07-18 | End: 2019-07-19 | Stop reason: HOSPADM

## 2019-07-18 RX ORDER — SODIUM CHLORIDE 0.9 % (FLUSH) 0.9 %
5-40 SYRINGE (ML) INJECTION AS NEEDED
Status: DISCONTINUED | OUTPATIENT
Start: 2019-07-18 | End: 2019-07-19 | Stop reason: HOSPADM

## 2019-07-18 RX ORDER — MECLIZINE HCL 12.5 MG 12.5 MG/1
25 TABLET ORAL
Status: COMPLETED | OUTPATIENT
Start: 2019-07-18 | End: 2019-07-18

## 2019-07-18 RX ORDER — EZETIMIBE 10 MG/1
10 TABLET ORAL DAILY
Status: DISCONTINUED | OUTPATIENT
Start: 2019-07-19 | End: 2019-07-19 | Stop reason: HOSPADM

## 2019-07-18 RX ORDER — ACETAMINOPHEN 325 MG/1
650 TABLET ORAL
Status: DISCONTINUED | OUTPATIENT
Start: 2019-07-19 | End: 2019-07-19 | Stop reason: HOSPADM

## 2019-07-18 RX ADMIN — ONDANSETRON 4 MG: 2 INJECTION INTRAMUSCULAR; INTRAVENOUS at 16:25

## 2019-07-18 RX ADMIN — ONDANSETRON 4 MG: 2 INJECTION INTRAMUSCULAR; INTRAVENOUS at 19:36

## 2019-07-18 RX ADMIN — MECLIZINE 25 MG: 12.5 TABLET ORAL at 19:30

## 2019-07-18 RX ADMIN — SODIUM CHLORIDE 1000 ML: 900 INJECTION, SOLUTION INTRAVENOUS at 15:49

## 2019-07-18 RX ADMIN — ACETAMINOPHEN: 500 TABLET ORAL at 21:23

## 2019-07-18 RX ADMIN — Medication 10 ML: at 19:05

## 2019-07-18 RX ADMIN — Medication 10 ML: at 21:24

## 2019-07-18 RX ADMIN — MECLIZINE 25 MG: 12.5 TABLET ORAL at 16:25

## 2019-07-18 RX ADMIN — SODIUM CHLORIDE 1000 ML: 900 INJECTION, SOLUTION INTRAVENOUS at 14:35

## 2019-07-18 NOTE — H&P
History and Physical  Primary Care Provider: Moriah Reynolds MD    Subjective:     Bucky Gutierres is a 66 y.o. female with pmh of HLD, seasonal allergies, who presents with dizziness, nausea and vomiting since yesterday morning. Her dizziness only occurs with movement or standing, and additionally she feels unsteady on her feet like she is going to fall. She has had an episode of dizziness in the past, and associated vertigo. She has had a few episodes of NBNB emesis, last vomiting occurred today. Has not been able to tolerate any solid foods, but able to tolerate liquids. Otherwise denies any F/C/URI symptoms/abdominal pain/constipation. Had a few episodes of diarrhea which occurred during her episodes of vomiting. At baseline she ambulates without assistance, and is very active, plays tennis. Review of Systems:    A comprehensive review of systems was negative except for that written in the History of Present Illness. Past Medical History:   Diagnosis Date    Allergic rhinitis, cause unspecified 12/17/2009    Anemia NEC     Benign neoplasm of colon     Hypercholesterolemia       Past Surgical History:   Procedure Laterality Date    BREAST SURGERY PROCEDURE UNLISTED      left & rt. bx - benign    COLONOSCOPY N/A 11/30/2017    COLONOSCOPY performed by Beverly Zavaleta MD at [facility]. [address], COLON, DIAGNOSTIC  7/06    due repeat 2011 per note    HX BREAST BIOPSY Left 1990's    benign    HX BREAST BIOPSY Right 1980's    benign    HX BUNIONECTOMY      HX HYSTERECTOMY      partial    HX ORTHOPAEDIC      ACL - right knee    HX ORTHOPAEDIC  2014    right foot surgery    HX ORTHOPAEDIC  2015    left thumb surgery    HX TONSILLECTOMY      US GUIDED CORE BREAST BIOPSY Left 1990's    benign     Prior to Admission medications    Medication Sig Start Date End Date Taking?  Authorizing Provider   meclizine (ANTIVERT) 25 mg tablet Take 1 Tab by mouth three (3) times daily as needed for Dizziness for up to 10 days. 7/18/19 7/28/19 Yes Attila Castro MD   betamethasone valerate (LUXIQ) 0.12 % topical foam MARGO FOAM EXT TO THE SCALP BID FOR RASH 1/15/19   Provider, Historical   azelastine (ASTELIN) 137 mcg nasal spray as needed. 3/14/14   Provider, Historical   CHOLECALCIFEROL, VITAMIN D3, (VITAMIN D3 PO) Take 400 Units by mouth daily. Provider, Historical   PV W-O MARIEL/FERROUS FUMARATE/FA (M-VIT PO) Take  by mouth. Takes one multivitamin per day. Provider, Historical   CALCIUM CARBONATE (CALCIUM 300 PO) Take 300 mg by mouth daily. Provider, Historical     Allergies   Allergen Reactions    Lipitor [Atorvastatin] Myalgia    Oxycodone Other (comments)     Unable to walk, \"completely knocks me out\"    Sulfa (Sulfonamide Antibiotics) Hives    Zocor [Simvastatin] Myalgia      Family History   Problem Relation Age of Onset    Breast Cancer Mother 48    Heart Disease Father     Breast Cancer Maternal Grandmother     Other Brother         TIA    Coronary Artery Disease Paternal Uncle         SOCIAL HISTORY:  Patient resides at Home. Patient ambulates without assistance. Smoking history: never  Alcohol history: Daily 1-2 glasses of wine. Objective:       Physical Exam:   Visit Vitals  BP (!) 175/92   Pulse 83   Temp 98.5 °F (36.9 °C)   Resp 16   SpO2 96%     General appearance: alert, cooperative, no distress, appears stated age  Head: Normocephalic, without obvious abnormality, atraumatic  Eyes: conjunctivae/corneas clear. PERRL, EOM's intact. + horizontal nystagmus   Throat: Lips, mucosa, and tongue normal. Teeth and gums normal  Lungs: clear to auscultation bilaterally  Heart: regular rate and rhythm, S1, S2 normal, no murmur, click, rub or gallop  Abdomen: soft, non-tender.  Bowel sounds normal. No masses,  no organomegaly  Extremities: extremities normal, atraumatic, no cyanosis or edema  Pulses: 2+ and symmetric  Skin: Skin color, texture, turgor normal. No rashes or lesions  Neurologic: Grossly normal, + horizontal nystagmus. CN 2-12 grossly intact. Did not ambulate as patient feeling nauseous     ECG:  normal EKG, normal sinus rhythm     Data Review: All diagnostic labs and studies have been reviewed. CT head 7/18  IMPRESSION:   1. No acute intracranial hemorrhage.     2. Age-indeterminate mild microvascular ischemic disease in the periventricular  white matter. Assessment:     Active Problems:    Dizziness (7/18/2019)        Plan:     Dizziness, nausea, vomiting - likely secondary to vertigo  - Meclizine 25mg every 6 hours  - Monitor neurologic checks every 4 hours  - PRN zofran, compazine  - Tylenol as needed for headache   - Hopefully home tomorrow with improvement in symptoms.   - No focal neurologic signs, no indication for further imaging. CT scan negative on admission for intracranial lesion. Seasonal allergies - home meds  HLD - home zetia ordered.      FULL CODE    FUNCTIONAL STATUS PRIOR TO HOSPITALIZATION (including history of recent falls): Ambulates without asistance    Signed By: Amanda Huitron MD     July 18, 2019

## 2019-07-18 NOTE — PROGRESS NOTES
Primary Nurse Cristela Varela RN and Irene Harrington RN performed a dual skin assessment on this patient No impairment noted  Hayden score is 23

## 2019-07-18 NOTE — ED NOTES
Patient resting in bed with no signs of distress, denies any needs at this time. Will continue to monitor.

## 2019-07-18 NOTE — PROGRESS NOTES
Bedside and Verbal shift change report given to Lemmie Bloch (oncoming nurse) by Severo Diego (offgoing nurse). Report included the following information SBAR.

## 2019-07-18 NOTE — PROGRESS NOTES
TRANSFER - IN REPORT:    Verbal report received from Mary(name) on Vamshi Majano  being received from ED(unit) for routine progression of care      Report consisted of patients Situation, Background, Assessment and   Recommendations(SBAR). Information from the following report(s) SBAR was reviewed with the receiving nurse. Opportunity for questions and clarification was provided. Assessment completed upon patients arrival to unit and care assumed.

## 2019-07-18 NOTE — ROUTINE PROCESS
TRANSFER - OUT REPORT:    Verbal report given to Airam RN(name) on Marly Briones  being transferred to (unit) for routine progression of care       Report consisted of patients Situation, Background, Assessment and   Recommendations(SBAR). Information from the following report(s) SBAR, Kardex, ED Summary, [facility] and Recent Results was reviewed with the receiving nurse. Lines:   Peripheral IV 07/18/19 Right Antecubital (Active)   Site Assessment Clean, dry, & intact 7/18/2019  2:34 PM   Phlebitis Assessment 0 7/18/2019  2:34 PM   Infiltration Assessment 0 7/18/2019  2:34 PM   Dressing Status Clean, dry, & intact 7/18/2019  2:34 PM   Dressing Type Transparent 7/18/2019  2:34 PM   Hub Color/Line Status Pink;Flushed;Patent 7/18/2019  2:34 PM   Action Taken Catheter retaped;Blood drawn 7/18/2019  2:34 PM        Opportunity for questions and clarification was provided.       Patient transported with:   [employer]

## 2019-07-18 NOTE — ED PROVIDER NOTES
66 y.o. female with past medical history significant for Anemia, Benign neoplasm of colon, and Hypercholesterolemia who presents from home with chief complaint of dizziness. Patient reports onset yesterday of dizziness with subsequent nausea and vomiting. Patient presents to [facility] ED with persisting dizziness, nausea, and vomiting. Patient reports exacerbation of dizziness upon standing, and alleviation with lying still. Patient endorses history of vertigo and states symptoms feel similar. Patient denies recent GLF or trauma. Patient denies history of MI or stroke. Pt denies fever, chills, cough, congestion, shortness of breath, chest pain, abdominal pain, diarrhea, difficulty with urination or dysuria. There are no other acute medical concerns at this time. PCP: Alonso Stockton MD    Note written by Josephine Tineo, as dictated by Liz Rea MD 2:34 PM      The history is provided by the patient.         Past Medical History:   Diagnosis Date    Allergic rhinitis, cause unspecified 12/17/2009    Anemia NEC     Benign neoplasm of colon     Hypercholesterolemia        Past Surgical History:   Procedure Laterality Date    BREAST SURGERY PROCEDURE UNLISTED      left & rt. bx - benign    COLONOSCOPY N/A 11/30/2017    COLONOSCOPY performed by Dolores Desai MD at [facility]. [address], COLON, DIAGNOSTIC  7/06    due repeat 2011 per note    HX BREAST BIOPSY Left 1990's    benign    HX BREAST BIOPSY Right 1980's    benign    HX BUNIONECTOMY      HX HYSTERECTOMY      partial    HX ORTHOPAEDIC      ACL - right knee    HX ORTHOPAEDIC  2014    right foot surgery    HX ORTHOPAEDIC  2015    left thumb surgery    HX TONSILLECTOMY      US GUIDED CORE BREAST BIOPSY Left 1990's    benign         Family History:   Problem Relation Age of Onset    Breast Cancer Mother 48    Heart Disease Father     Breast Cancer Maternal Grandmother     Other Brother         TIA    Coronary Artery Disease Paternal Uncle        Social History     Socioeconomic History    Marital status:      Spouse name: Not on file    Number of children: Not on file    Years of education: Not on file    Highest education level: Not on file   Occupational History    Not on file   Social Needs    Financial resource strain: Not on file    Food insecurity:     Worry: Not on file     Inability: Not on file    Transportation needs:     Medical: Not on file     Non-medical: Not on file   Tobacco Use    Smoking status: Former Smoker     Types: Cigarettes     Last attempt to quit: 1987     Years since quittin.5    Smokeless tobacco: Never Used    Tobacco comment: former cigarette smoker   Substance and Sexual Activity    Alcohol use: Yes     Alcohol/week: 2.0 standard drinks     Types: 1 Glasses of wine, 1 Standard drinks or equivalent per week    Drug use: No    Sexual activity: Yes     Partners: Male     Birth control/protection: None   Lifestyle    Physical activity:     Days per week: Not on file     Minutes per session: Not on file    Stress: Not on file   Relationships    Social connections:     Talks on phone: Not on file     Gets together: Not on file     Attends [employer] service: Not on file     Active member of club or organization: Not on file     Attends meetings of clubs or organizations: Not on file     Relationship status: Not on file    Intimate partner violence:     Fear of current or ex partner: Not on file     Emotionally abused: Not on file     Physically abused: Not on file     Forced sexual activity: Not on file   Other Topics Concern    Not on file   Social History Narrative    Not on file         ALLERGIES: Lipitor [atorvastatin]; Oxycodone; Sulfa (sulfonamide antibiotics); and Zocor [simvastatin]    Review of Systems   Constitutional: Negative for chills and fever. HENT: Negative for facial swelling and nosebleeds. Eyes: Negative for pain.    Respiratory: Negative for cough, chest tightness and shortness of breath. Cardiovascular: Negative for chest pain and leg swelling. Gastrointestinal: Positive for nausea and vomiting. Negative for abdominal pain and diarrhea. Endocrine: Negative for polyuria. Genitourinary: Negative for difficulty urinating and flank pain. Musculoskeletal: Negative for arthralgias and back pain. Skin: Negative for color change. Allergic/Immunologic: Negative for immunocompromised state. Neurological: Negative for dizziness and headaches. Hematological: Does not bruise/bleed easily. Psychiatric/Behavioral: Negative for agitation. All other systems reviewed and are negative. Vitals:    07/18/19 1307   BP: (!) 175/92   Pulse: 83   Resp: 16   Temp: 98.5 °F (36.9 °C)   SpO2: 96%            Physical Exam   Constitutional: She is oriented to person, place, and time. She appears well-developed and well-nourished. HENT:   Head: Normocephalic and atraumatic. Right Ear: External ear normal.   Left Ear: External ear normal.   Nose: Nose normal.   Mouth/Throat: Oropharynx is clear and moist.   Turns head from side to side with no difficulty    Eyes: EOM are normal. No scleral icterus. Pupils 3 mm and reactive   Neck: Normal range of motion. Neck supple. No JVD present. No tracheal deviation present. No thyromegaly present. Cardiovascular: Normal rate, regular rhythm, normal heart sounds and intact distal pulses. Exam reveals no friction rub. No murmur heard. Pulmonary/Chest: Effort normal and breath sounds normal. No stridor. No respiratory distress. She has no wheezes. She has no rales. She exhibits no tenderness. Abdominal: Soft. Bowel sounds are normal. She exhibits no distension. There is no tenderness. There is no rebound and no guarding. Musculoskeletal: Normal range of motion. She exhibits no edema or tenderness. Lymphadenopathy:     She has no cervical adenopathy.    Neurological: She is alert and oriented to person, place, and time. She has normal reflexes. No cranial nerve deficit. Coordination normal.   Skin: Skin is warm and dry. No rash noted. No erythema. Psychiatric: She has a normal mood and affect. Her behavior is normal. Judgment and thought content normal.   Nursing note and vitals reviewed. Note written by Josephine Jackson, as dictated by Brandan Hinton MD 2:34 PM       MDM  Number of Diagnoses or Management Options  Diagnosis management comments: 18-year-old white female presents to the emergency department with dizziness and lightheadedness. Patient exhibited vertigo. The symptoms are worse if she stands up. We'll check a head CT, basic blood work, urinalysis. We'll give Zofran, meclizine, IV fluids. We'll reassess when testing is completed. Patient agrees with this plan. Amount and/or Complexity of Data Reviewed  Clinical lab tests: ordered and reviewed  Tests in the radiology section of CPT®: ordered and reviewed  Tests in the medicine section of CPT®: ordered and reviewed  Decide to obtain previous medical records or to obtain history from someone other than the patient: yes  Obtain history from someone other than the patient: yes  Review and summarize past medical records: yes  Independent visualization of images, tracings, or specimens: yes    Risk of Complications, Morbidity, and/or Mortality  Presenting problems: high  Diagnostic procedures: high  Management options: high           Procedures  PROGRESS NOTE:  4:08 PM Waiting on UA. ED EKG interpretation:  Rhythm: normal sinus rhythm; and regular . Rate (approx.): 72 bpm; Axis: normal; Intervals: normal; ST/T wave: No ST elevations or depressions.   Note written by Josephine Jackson, as dictated by Brandan Hinton MD 4:39 PM    PROGRESS NOTE:  4:49 PM   Received UA for urine test    Will ambulate shortly    CT head is negetive    Labs are WNL    5:09 PM  Walked pt and she is unstable and wobbly  She doesn't feel well and can't go home  Will admit for vertigo workup    Hospitalist Haider for Admission  5:10 PM    ED Room Number: ER24/24  Patient Name and age:  Andra Ferrara 66 y.o.  female  Working Diagnosis:   1. Dizziness    2. Vertigo    3.  Impaired ambulation      Readmission: no  Isolation Requirements:  no  Recommended Level of Care:  telemetry  Code Status:  Full Code  Other:    Department:[facility] Adult ED - [phone]

## 2019-07-18 NOTE — ED TRIAGE NOTES
Pt c/o dizziness that started yesterday, +headache, denies fever, hx of vertigo, pt stated her bp went up to 170's, denies any recent falls or trauma, +nausea

## 2019-07-18 NOTE — PROGRESS NOTES
Clarified with Dr. Izzy Roberts that patient does not need to be admitted to a telemetry floor and also does not require remote telemetry.

## 2019-07-19 VITALS
OXYGEN SATURATION: 94 % | TEMPERATURE: 98.1 F | DIASTOLIC BLOOD PRESSURE: 84 MMHG | HEART RATE: 64 BPM | RESPIRATION RATE: 18 BRPM | SYSTOLIC BLOOD PRESSURE: 162 MMHG

## 2019-07-19 PROBLEM — R42 VERTIGO: Status: ACTIVE | Noted: 2019-07-19

## 2019-07-19 LAB
ATRIAL RATE: 72 BPM
CALCULATED P AXIS, ECG09: 68 DEGREES
CALCULATED R AXIS, ECG10: 20 DEGREES
CALCULATED T AXIS, ECG11: 5 DEGREES
DIAGNOSIS, 93000: NORMAL
P-R INTERVAL, ECG05: 176 MS
Q-T INTERVAL, ECG07: 420 MS
QRS DURATION, ECG06: 86 MS
QTC CALCULATION (BEZET), ECG08: 459 MS
VENTRICULAR RATE, ECG03: 72 BPM

## 2019-07-19 PROCEDURE — 74011250636 HC RX REV CODE- 250/636: Performed by: INTERNAL MEDICINE

## 2019-07-19 PROCEDURE — 74011250637 HC RX REV CODE- 250/637: Performed by: INTERNAL MEDICINE

## 2019-07-19 PROCEDURE — 96376 TX/PRO/DX INJ SAME DRUG ADON: CPT

## 2019-07-19 PROCEDURE — 99218 HC RM OBSERVATION: CPT

## 2019-07-19 PROCEDURE — 74011250637 HC RX REV CODE- 250/637: Performed by: NURSE PRACTITIONER

## 2019-07-19 RX ORDER — PROCHLORPERAZINE MALEATE 5 MG
5 TABLET ORAL
Qty: 10 TAB | Refills: 0 | Status: SHIPPED | OUTPATIENT
Start: 2019-07-19 | End: 2019-07-26

## 2019-07-19 RX ORDER — MECLIZINE HYDROCHLORIDE 25 MG/1
25 TABLET ORAL
Qty: 25 TAB | Refills: 0 | Status: SHIPPED | OUTPATIENT
Start: 2019-07-19 | End: 2019-07-29

## 2019-07-19 RX ORDER — ONDANSETRON 4 MG/1
4 TABLET, FILM COATED ORAL
Qty: 15 TAB | Refills: 0 | Status: SHIPPED | OUTPATIENT
Start: 2019-07-19 | End: 2019-07-26

## 2019-07-19 RX ADMIN — MECLIZINE 25 MG: 12.5 TABLET ORAL at 06:38

## 2019-07-19 RX ADMIN — ACETAMINOPHEN 650 MG: 325 TABLET ORAL at 07:15

## 2019-07-19 RX ADMIN — MECLIZINE 25 MG: 12.5 TABLET ORAL at 01:41

## 2019-07-19 RX ADMIN — ACETAMINOPHEN 650 MG: 325 TABLET ORAL at 01:42

## 2019-07-19 RX ADMIN — Medication 10 ML: at 06:38

## 2019-07-19 RX ADMIN — EZETIMIBE 10 MG: 10 TABLET ORAL at 09:00

## 2019-07-19 RX ADMIN — ONDANSETRON 4 MG: 2 INJECTION INTRAMUSCULAR; INTRAVENOUS at 10:17

## 2019-07-19 NOTE — ADT AUTH CERT NOTES
Facility Name: [facility]             NPI: [id number]  [address] [facility]     Luca Anglin MD  NPI: [id number]  [address] [place] [address]      Dx code: J30.9                     Patient Demographics     Patient Name  Tereza Gray Sex  Female   1945 Address  [address] [facility] [address] Phone  [phone] [facility])  [phone] (Mobile) *[facility] Account     Name Acct ID Class Status Primary Coverage   Tereza Gray [id number] OBSERVATION Open BLUE CROSS MEDICARE - [id number] PPO          Guarantor Account (for Hospital Account [de-identified])     Name Relation to Pt Service Area Active? Acct Type   Unice Gallop Self [facility] Yes Personal/Family   Address Phone     [address]  [phone](P)            Coverage Information (for Hospital Account [de-identified])     F/O Payor/Plan Subscriber  Subscriber Sex Precert #   BLUE CROSS 400 [address]emy  CROSS MEDICARE PPO 41 F    Subscriber Subscriber #   Tereza Gray [id number]   Grp # Group Name   [id number] [employer] plan   Address Phone   [address]    Policy Number Status Effective Date Benefits Phone   [id number] -  -   Auth/Cert             Diagnosis      Codes Comments   Dizziness  ICD-10-CM: R42  ICD-9-CM: 104. 4           Admission Information     Arrival Date/Time: 2019 1300 Admit Date/Time: 2019 1415 IP Adm.  Date/Time:    Admission Type: Emergency Point of Origin: Non-health Care Facility/self Admit Category:    Means of Arrival: Car Primary Service: Medicine Secondary Service: N/A   Transfer Source:  Service Area: [address] [place] [address] Unit: [address] [place] [address] JOINT   Admit Provider: Luca Anglin MD Attending Provider: Christo Aguillon MD Referring Provider:    Admission Information     Attending Provider Admission Dx Admitted On   Luca Anglin MD Dizziness 19   Service Isolation Code Status   MEDICINE  Full Code   Allergies Advance Care Planning    Lipitor [Atorvastatin], Oxycodone, Sulfa (Sulfonamide Antibiotics), Zocor [Simvastatin] Jump to the Activity     Admission Information     Unit/Bed: [address] [place] [address] JOINT/01 Service: MEDICINE   Admitting provider: Jonathan Gasca MD Phone: [phone]   Attending provider: Jonathan Gasca MD Phone: [phone]   PCP: Gonzalo Wood MD Phone: [phone]   Admission dx:  Patient class: V   Admission type: ER     Patient Demographics     Patient Name  Charmaine Niño  [id number] Sex  Female   1945 Address  [address] [address] Phone  [phone] [facility])  [phone] (Mobile) *Preferred*   H&P Notes      H&P by Jonathan Gasca MD at 19 documented on ED to Hosp-Admission (Current) from 2019 in [address] [place] [address]   Author: Jonathan Gasca MD Author Type: Physician Filed: [id number]   Note Status: Signed Cosign: Cosign Not Required Date of Service: 19   : Jonathan Gasca MD (Physician)      History and Physical  Primary Care Provider: Gonzalo Wood MD     Subjective:      Feliciano Guthrie is a 66 y.o. female with pmh of HLD, seasonal allergies, who presents with dizziness, nausea and vomiting since yesterday morning. Her dizziness only occurs with movement or standing, and additionally she feels unsteady on her feet like she is going to fall. She has had an episode of dizziness in the past, and associated vertigo. She has had a few episodes of NBNB emesis, last vomiting occurred today. Has not been able to tolerate any solid foods, but able to tolerate liquids. Otherwise denies any F/C/URI symptoms/abdominal pain/constipation. Had a few episodes of diarrhea which occurred during her episodes of vomiting.  At baseline she ambulates without assistance, and is very active, plays tennis.      Review of Systems:     A comprehensive review of systems was negative except for that written in the History of Present Illness.           Past Medical History:   Diagnosis Date    Allergic rhinitis, cause unspecified 12/17/2009    Anemia NEC      Benign neoplasm of colon      Hypercholesterolemia              Past Surgical History:   Procedure Laterality Date    BREAST SURGERY PROCEDURE UNLISTED         left & rt. bx - benign    COLONOSCOPY N/A 11/30/2017     COLONOSCOPY performed by Jackie Spear MD at [facility]. [address], COLON, DIAGNOSTIC   7/06     due repeat 2011 per note    HX BREAST BIOPSY Left 1990's     benign    HX BREAST BIOPSY Right 1980's     benign    HX BUNIONECTOMY        HX HYSTERECTOMY         partial    HX ORTHOPAEDIC         ACL - right knee    HX ORTHOPAEDIC   2014     right foot surgery    HX ORTHOPAEDIC   2015     left thumb surgery    HX TONSILLECTOMY        US GUIDED CORE BREAST BIOPSY Left 1990's     benign              Prior to Admission medications    Medication Sig Start Date End Date Taking? Authorizing Provider   meclizine (ANTIVERT) 25 mg tablet Take 1 Tab by mouth three (3) times daily as needed for Dizziness for up to 10 days. 7/18/19 7/28/19 Yes Kavita Jones MD   betamethasone valerate (LUXIQ) 0.12 % topical foam MARGO FOAM EXT TO THE SCALP BID FOR RASH 1/15/19     Provider, Historical   azelastine (ASTELIN) 137 mcg nasal spray as needed. 3/14/14     Provider, Historical   CHOLECALCIFEROL, VITAMIN D3, (VITAMIN D3 PO) Take 400 Units by mouth daily.         Provider, Historical   PV W-O MARIEL/FERROUS FUMARATE/FA (M-VIT PO) Take  by mouth.  Takes one multivitamin per day.       Provider, Historical   CALCIUM CARBONATE (CALCIUM 300 PO) Take 300 mg by mouth daily.       Provider, Historical            Allergies   Allergen Reactions    Lipitor [Atorvastatin] Myalgia    Oxycodone Other (comments)       Unable to walk, \"completely knocks me out\"    Sulfa (Sulfonamide Antibiotics) Hives    Zocor [Simvastatin] Myalgia            Family History   Problem Relation Age of Onset    Breast Cancer Mother 48    Heart Disease Father      Breast Cancer Maternal Grandmother      Other Brother           TIA    Coronary Artery Disease Paternal Uncle           SOCIAL HISTORY:  Patient resides at Home. Patient ambulates without assistance. Smoking history: never  Alcohol history: Daily 1-2 glasses of wine.            Objective:         Physical Exam:   Visit Vitals  BP (!) 175/92   Pulse 83   Temp 98.5 °F (36.9 °C)   Resp 16   SpO2 96%      General appearance: alert, cooperative, no distress, appears stated age  Head: Normocephalic, without obvious abnormality, atraumatic  Eyes: conjunctivae/corneas clear. PERRL, EOM's intact. + horizontal nystagmus   Throat: Lips, mucosa, and tongue normal. Teeth and gums normal  Lungs: clear to auscultation bilaterally  Heart: regular rate and rhythm, S1, S2 normal, no murmur, click, rub or gallop  Abdomen: soft, non-tender. Bowel sounds normal. No masses,  no organomegaly  Extremities: extremities normal, atraumatic, no cyanosis or edema  Pulses: 2+ and symmetric  Skin: Skin color, texture, turgor normal. No rashes or lesions  Neurologic: Grossly normal, + horizontal nystagmus. CN 2-12 grossly intact. Did not ambulate as patient feeling nauseous      ECG:  normal EKG, normal sinus rhythm      Data Review: All diagnostic labs and studies have been reviewed. CT head 7/18  IMPRESSION:   1. No acute intracranial hemorrhage.     2. Age-indeterminate mild microvascular ischemic disease in the periventricular  white matter.     Assessment:      Active Problems:    Dizziness (7/18/2019)           Plan:      Dizziness, nausea, vomiting - likely secondary to vertigo  - Meclizine 25mg every 6 hours  - Monitor neurologic checks every 4 hours  - PRN zofran, compazine  - Tylenol as needed for headache   - Hopefully home tomorrow with improvement in symptoms. - No focal neurologic signs, no indication for further imaging. CT scan negative on admission for intracranial lesion.     Seasonal allergies - home meds  HLD - home zetia ordered.      FULL CODE     FUNCTIONAL STATUS PRIOR TO HOSPITALIZATION (including history of recent falls): Ambulates without asistance     Signed By: Ayan Baldwin MD      2019                 Patient Demographics     Patient Name  Rachel Williamson  [id number] Sex  Female   1945 [facility] [address] [place] [address] Phone  [phone] (Home)  [phone] (Mobile) *Preferred*   CSN:   [id number]   Admit Date: Admit Time Room Bed   2019  2:15  [31299] 01 [52885]   Attending Providers     Provider Pager From To   Feliciano Bautista MD  19   Zina Layne MD  19    Emergency Contact(s)     Name Relation Home Work Mobile   Kassie Briones Daughter [phone]   Andres Salvador Daughter [phone]     Utilization Reviews         Dizziness - Care Day 1 (2019) by Kaushal Roth         Review Entered Review Status   2019 09:00 Completed       Criteria Review      Care Day: 1 Care Date: 2019 Level of Care: Inpatient Floor    Guideline Day 1    Clinical Status    ( ) * Clinical Indications met[C]    Routes    (X) IV fluids, medications    2019 08:59:47 EDT by Qi Sanchez      Ns 1000ml bolus iv x2    Interventions    (X) Possible brain imaging (eg, MRI)    2019 08:59:47 EDT by Qi Sanchez      Head CT IMPRESSION:   1. No acute intracranial hemorrhage.     2. Age-indeterminate mild microvascular ischemic disease in the periventricular  white matter.     (X) Possible cardiac monitoring and neurologic checks    2019 08:59:47 EDT by Qi Sanchez      continuous vs monitoring    Medications    (X) Symptomatic medication as indicated    2019 08:59:47 EDT by Qi Sanchez      Antivert 25mg po x3    * Milestone   Additional Notes   7/18/19    LOC OBS    98.5 83 16 96% 175/92    Results for Apple Reid (MRN [id number]) as of 7/19/2019 08:51    7/18/2019 14:40    RBC 3.68 (L)    .2 (H)    NEUTROPHILS 78 (H)    IMMATURE GRANULOCYTES 1 (H)    ABS. LYMPHOCYTES 0.6 (L)    Glucose 116 (H)    Head CT IMPRESSION:    1. No acute intracranial hemorrhage.         2. Age-indeterminate mild microvascular ischemic disease in the periventricular    white matter.         EKG Normal sinus rhythm    When compared with ECG of 26-FEB-2009 10:46,    Zofran 4 mg iv x2         Mahsa Davidson is a 66 y.o. female with pmh of HLD, seasonal allergies, who presents with dizziness, nausea and vomiting since yesterday morning. Her dizziness only occurs with movement or standing, and additionally she feels unsteady on her feet like she is going to fall. She has had an episode of dizziness in the past, and associated vertigo. She has had a few episodes of NBNB emesis, last vomiting occurred today. Has not been able to tolerate any solid foods, but able to tolerate liquids. Otherwise denies any F/C/URI symptoms/abdominal pain/constipation. Had a few episodes of diarrhea which occurred during her episodes of vomiting. At baseline she ambulates without assistance, and is very active, plays tennis. Assessment:         Active Problems:      Dizziness (7/18/2019)                   Plan:         Dizziness, nausea, vomiting - likely secondary to vertigo    - Meclizine 25mg every 6 hours    - Monitor neurologic checks every 4 hours    - PRN zofran, compazine    - Tylenol as needed for headache    - Hopefully home tomorrow with improvement in symptoms.    - No focal neurologic signs, no indication for further imaging.  CT scan negative on admission for intracranial lesion.         Seasonal allergies - home meds    HLD - home zetia ordered.         FULL CODE       Dizziness - Clinical Indications for Admission to Inpatient Care by [facility]     Review Entered Review Status   7/19/2019 08:58 Completed       Criteria Review      Clinical Indications for Admission to Inpatient Care    Most Recent : Christianne Aguirre Most Recent Date: 7/19/2019 08:58:10 EDT

## 2019-07-19 NOTE — PROGRESS NOTES
Hospital follow-up PCP transitional care appointment has been scheduled with Dr. Simpson President for Friday, 7/26/19 at 11:15 a.m. Pending patient discharge.   Sergei Castellanos, Care Management Specialist.

## 2019-07-19 NOTE — PROGRESS NOTES
Bedside and Verbal shift change report given to Rizwan bae RN and Melanie Mlahotra RN (oncoming nurse) by SARAH Germain (offgoing nurse). Report included the following information SBAR, Kardex, ED Summary, Intake/Output, MAR, Recent Results and Cardiac Rhythm NSR.

## 2019-07-19 NOTE — DISCHARGE SUMMARY
Discharge Summary       PATIENT ID: Michael Dixon  MRN: [id number]   YOB: 1945    DATE OF ADMISSION: 7/18/2019  2:15 PM    DATE OF DISCHARGE: 07/19/2019   PRIMARY CARE PROVIDER: Enrique Munoz MD     DISCHARGING PROVIDER: Ascencion Rodriguez MD    To contact this individual call [phone] and ask the  to page. If unavailable ask to be transferred the Adult Hospitalist Department. CONSULTATIONS: None    PROCEDURES/SURGERIES: * No surgery found *    ADMITTING DIAGNOSES & HOSPITAL COURSE:   BBPV - vertigo    Michael Dixon is a 66 y.o. female with pmh of HLD, seasonal allergies, who presents with dizziness, nausea and vomiting since yesterday morning. Physical exam notable for horizontal nystagmus consistent with BBPV. Started on meclizine with improvement. Stable for discharge, and follow up with ENT. DISCHARGE DIAGNOSES / PLAN:      Dizziness, nausea, vomiting - likely secondary to BBPV  - Meclizine 25mg for home, scheduled over the next 24 hours. - Monitor neurologic checks every 4 hours  - PRN zofran, compazine for home. - Tylenol as needed for headache   - No focal neurologic signs, no indication for further imaging. CT scan negative on admission for intracranial lesion.     Seasonal allergies - home meds  HLD - home zetia ordered. ADDITIONAL CARE RECOMMENDATIONS:  1. Please take all medications as prescribed. Note changes as below. - Add meclizine 25mg every 6 hours for the first 24 hours, then as needed thereafter.   - Use zofran and then compazine if you have nausea or vomiting that is not controlled by meclizine. 2. Please make sure to follow up with your primary care physician within 1-2 weeks of discharge for hospital follow up. You should also call an ENT physician to make a follow up appointment. 3. You should notice improvement in the next 24-48 hours.  I suggest staying with someone in the interim to help, until you are back on your feet and back to your normal self. 4. Avoid tobacco, alcohol, and other illicit drug use. Avoid drinking until your symtpoms resolved. Avoid sudden head movements, avoid close reading, close iphone use, until your symptoms have resolved. PENDING TEST RESULTS:   At the time of discharge the following test results are still pending: None    FOLLOW UP APPOINTMENTS:    Follow-up Information     Follow up With Specialties Details Why Contact Info    France Butt MD Internal Medicine In 1 week  [address]  [address] [address]  [phone]      [facility]  In 1 week Call to schedule a follow up appt for vertigo, in case your symptoms do not improve. [address]             DIET: Regular Diet    ACTIVITY: Activity as tolerated    WOUND CARE: None    EQUIPMENT needed: None      DISCHARGE MEDICATIONS:  Current Discharge Medication List      START taking these medications    Details   ondansetron hcl (ZOFRAN) 4 mg tablet Take 1 Tab by mouth every eight (8) hours as needed for Nausea. Qty: 15 Tab, Refills: 0      prochlorperazine (COMPAZINE) 5 mg tablet Take 1 Tab by mouth every six (6) hours as needed for Nausea for up to 7 days. Qty: 10 Tab, Refills: 0         CONTINUE these medications which have CHANGED    Details   meclizine (ANTIVERT) 25 mg tablet Take 1 Tab by mouth three (3) times daily as needed for Dizziness for up to 10 days. Qty: 25 Tab, Refills: 0         CONTINUE these medications which have NOT CHANGED    Details   betamethasone valerate (LUXIQ) 0.12 % topical foam MARGO FOAM EXT TO THE SCALP BID FOR RASH  Refills: 1      azelastine (ASTELIN) 137 mcg nasal spray as needed. CHOLECALCIFEROL, VITAMIN D3, (VITAMIN D3 PO) Take 400 Units by mouth daily. PV W-O MARIEL/FERROUS FUMARATE/FA (M-VIT PO) Take  by mouth. Takes one multivitamin per day. CALCIUM CARBONATE (CALCIUM 300 PO) Take 300 mg by mouth daily.                NOTIFY YOUR PHYSICIAN FOR ANY OF THE FOLLOWING:   Fever over 101 degrees for 24 hours. Chest pain, shortness of breath, fever, chills, nausea, vomiting, diarrhea, change in mentation, falling, weakness, bleeding. Severe pain or pain not relieved by medications. Or, any other signs or symptoms that you may have questions about.     DISPOSITION:  X  Home With:   OT  PT  HH  RN       Long term SNF/Inpatient Rehab    Independent/assisted living    Hospice    Other:       PATIENT CONDITION AT DISCHARGE:     Functional status    Poor     Deconditioned    X Independent      Cognition    X Lucid     Forgetful     Dementia      Catheters/lines (plus indication)    Marie     PICC     PEG    X None      Code status   X  Full code     DNR      PHYSICAL EXAMINATION AT DISCHARGE:  Visit Vitals  /84 (BP 1 Location: Left arm, BP Patient Position: At rest)   Pulse 64   Temp 98.1 °F (36.7 °C)   Resp 18   SpO2 94%     Gen: NAD, awake in bed  HEENT: NC/AT, sclera anicteric, PERRL, EOMI, mild nystagmus   CV: RRR no m/r/g  Resp: CTA b/l no increased work of breathing  Abd: NT/ND, normal bowel sounds  Ext: 2+ pulses, no edema  Skin: No rashes      CHRONIC MEDICAL DIAGNOSES:  Problem List as of 7/19/2019 Date Reviewed: 5/30/2019          Codes Class Noted - Resolved    Dizziness ICD-10-CM: R42  ICD-9-CM: 780.4  7/18/2019 - Present        Hypercholesteremia ICD-10-CM: E78.00  ICD-9-CM: 272.0  4/29/2016 - Present        Osteopenia ICD-10-CM: M85.80  ICD-9-CM: 733.90  12/17/2009 - Present        Allergic rhinitis ICD-10-CM: J30.9  ICD-9-CM: 477.9  12/17/2009 - Present        RESOLVED: Leukopenia ICD-10-CM: D72.819  ICD-9-CM: 288.50  1/10/2011 - 11/17/2017        RESOLVED: Anemia ICD-10-CM: D64.9  ICD-9-CM: 285.9  12/17/2009 - 4/16/2015              Greater than 30 minutes were spent with the patient on counseling and coordination of care    Signed:   Sofía Jeffries MD  7/19/2019  9:24 AM

## 2019-07-19 NOTE — PROGRESS NOTES
Reason for Admission:   Dizziness                   RRAT Score:     5                Plan for utilizing home health:      Not needed. Current Advanced Directive/Advance Care Plan: Not on file. Transition of Care Plan:     CM met with pt to introduce her to the CM role. This pt lives alone, but plans to stay with one of her daughters at d/c.

## 2019-07-19 NOTE — ADT AUTH CERT NOTES
Facility Name: [address]             NPI: [id number]  [address] [facility]     Thalia Cornelius MD  NPI: [id number]  [address]      Dx code: J30.9         Patient Demographics     Patient Name  Altamease Olszewski Sex  Female   1945 Address  [address] [facility] [address] Phone  [phone] [facility])  [phone] (Mobile) *[facility] Account     Name Acct ID Class Status Primary Coverage   Altamease Olszewski [id number] OBSERVATION Open BLUE CROSS MEDICARE - [id number] PPO          Guarantor Account (for Hospital Account [de-identified])     Name Relation to Pt Service Area Active? Acct Type   Altamease Olszewski Self [facility] Yes Personal/Family   Address Phone     [address]  [phone](S)            Coverage Information (for Hospital Account [de-identified])     F/O Payor/Plan Subscriber  Subscriber Sex Precert #   BLUE CROSS [facility] CROSS MEDICARE PPO 41 F    Subscriber Subscriber #   Altamease Olszewski [id number]   Grp # Group Name   [id number] [facility] plan   Address Phone   [address]    Policy Number Status Effective Date Benefits Phone   [id number] -  -   Auth/Cert             Diagnosis      Codes Comments   Dizziness  ICD-10-CM: R42  ICD-9-CM: 725. 4           Admission Information     Arrival Date/Time: 2019 1300 Admit Date/Time: 2019 1415 IP Adm.  Date/Time:    Admission Type: Emergency Point of Origin: Non-health Care Facility/self Admit Category:    Means of Arrival: Car Primary Service: Medicine Secondary Service: N/A   Transfer Source:  Service Area: [facility] Unit: [address] [place] [address] JOINT   Admit Provider: Thalia Cornelius MD Attending Provider: Joe Calloway MD Referring Provider:    Admission Information     Attending Provider Admission Dx Admitted On   Thalia Cornelius MD Dizziness 19   Service Isolation Code Status   MEDICINE  Full Code   Allergies Advance Care Planning    Lipitor [Atorvastatin], Oxycodone, Sulfa (Sulfonamide Antibiotics), Zocor [Simvastatin] Jump to the Activity     Admission Information     Unit/Bed: [address] [place] [address] JOINT/01 Service: MEDICINE   Admitting provider: Vineet Travis MD Phone: [phone]   Attending provider: Vineet Travis MD Phone: [phone]   PCP: Michelle Gonsalves MD Phone: [phone]   Admission dx:  Patient class: V   Admission type: ER     Patient Demographics     Patient Name  Vineet Parrish  [id number] Sex  Female   1945 Address  [address] [place] [address] Phone  [phone] [facility])  [phone] (Mobile) *Preferred*   H&P Notes      H&P by Vineet Travis MD at 19 documented on ED to Hosp-Admission (Current) from 2019 in [address] [place] [address]   Author: Vineet Travis MD Author Type: Physician Filed: 19 [id number]   Note Status: Signed Cosign: Cosign Not Required Date of Service: 19   : Vineet Travis MD (Physician)      History and Physical  Primary Care Provider: Michelle Gonsalves MD     Subjective:      Arnol Arredondo is a 66 y.o. female with pmh of HLD, seasonal allergies, who presents with dizziness, nausea and vomiting since yesterday morning. Her dizziness only occurs with movement or standing, and additionally she feels unsteady on her feet like she is going to fall. She has had an episode of dizziness in the past, and associated vertigo. She has had a few episodes of NBNB emesis, last vomiting occurred today. Has not been able to tolerate any solid foods, but able to tolerate liquids. Otherwise denies any F/C/URI symptoms/abdominal pain/constipation. Had a few episodes of diarrhea which occurred during her episodes of vomiting.  At baseline she ambulates without assistance, and is very active, plays tennis.      Review of Systems:     A comprehensive review of systems was negative except for that written in the History of Present Illness.           Past Medical History:   Diagnosis Date    Allergic rhinitis, cause unspecified 12/17/2009    Anemia NEC      Benign neoplasm of colon      Hypercholesterolemia              Past Surgical History:   Procedure Laterality Date    BREAST SURGERY PROCEDURE UNLISTED         left & rt. bx - benign    COLONOSCOPY N/A 11/30/2017     COLONOSCOPY performed by Rad Zuniga MD at [address] [place] [address] [place], COLON, DIAGNOSTIC   7/06     due repeat 2011 per note    HX BREAST BIOPSY Left 1990's     benign    HX BREAST BIOPSY Right 1980's     benign    HX BUNIONECTOMY        HX HYSTERECTOMY         partial    HX ORTHOPAEDIC         ACL - right knee    HX ORTHOPAEDIC   2014     right foot surgery    HX ORTHOPAEDIC   2015     left thumb surgery    HX TONSILLECTOMY        US GUIDED CORE BREAST BIOPSY Left 1990's     benign              Prior to Admission medications    Medication Sig Start Date End Date Taking? Authorizing Provider   meclizine (ANTIVERT) 25 mg tablet Take 1 Tab by mouth three (3) times daily as needed for Dizziness for up to 10 days. 7/18/19 7/28/19 Yes Sanket Osorio MD   betamethasone valerate (LUXIQ) 0.12 % topical foam MARGO FOAM EXT TO THE SCALP BID FOR RASH 1/15/19     Provider, Historical   azelastine (ASTELIN) 137 mcg nasal spray as needed. 3/14/14     Provider, Historical   CHOLECALCIFEROL, VITAMIN D3, (VITAMIN D3 PO) Take 400 Units by mouth daily.         Provider, Historical   PV W-O MARIEL/FERROUS FUMARATE/FA (M-VIT PO) Take  by mouth.  Takes one multivitamin per day.       Provider, Historical   CALCIUM CARBONATE (CALCIUM 300 PO) Take 300 mg by mouth daily.       Provider, Historical            Allergies   Allergen Reactions    Lipitor [Atorvastatin] Myalgia    Oxycodone Other (comments)       Unable to walk, \"completely knocks me out\"    Sulfa (Sulfonamide Antibiotics) Hives    Zocor [Simvastatin] Myalgia            Family History   Problem Relation Age of Onset    Breast Cancer Mother 48    Heart Disease Father      Breast Cancer Maternal Grandmother      Other Brother           TIA    Coronary Artery Disease Paternal Uncle           SOCIAL HISTORY:  Patient resides at Home. Patient ambulates without assistance. Smoking history: never  Alcohol history: Daily 1-2 glasses of wine.            Objective:         Physical Exam:   Visit Vitals  BP (!) 175/92   Pulse 83   Temp 98.5 °F (36.9 °C)   Resp 16   SpO2 96%      General appearance: alert, cooperative, no distress, appears stated age  Head: Normocephalic, without obvious abnormality, atraumatic  Eyes: conjunctivae/corneas clear. PERRL, EOM's intact. + horizontal nystagmus   Throat: Lips, mucosa, and tongue normal. Teeth and gums normal  Lungs: clear to auscultation bilaterally  Heart: regular rate and rhythm, S1, S2 normal, no murmur, click, rub or gallop  Abdomen: soft, non-tender. Bowel sounds normal. No masses,  no organomegaly  Extremities: extremities normal, atraumatic, no cyanosis or edema  Pulses: 2+ and symmetric  Skin: Skin color, texture, turgor normal. No rashes or lesions  Neurologic: Grossly normal, + horizontal nystagmus. CN 2-12 grossly intact. Did not ambulate as patient feeling nauseous      ECG:  normal EKG, normal sinus rhythm      Data Review: All diagnostic labs and studies have been reviewed. CT head 7/18  IMPRESSION:   1. No acute intracranial hemorrhage.     2. Age-indeterminate mild microvascular ischemic disease in the periventricular  white matter.     Assessment:      Active Problems:    Dizziness (7/18/2019)           Plan:      Dizziness, nausea, vomiting - likely secondary to vertigo  - Meclizine 25mg every 6 hours  - Monitor neurologic checks every 4 hours  - PRN zofran, compazine  - Tylenol as needed for headache   - Hopefully home tomorrow with improvement in symptoms. - No focal neurologic signs, no indication for further imaging. CT scan negative on admission for intracranial lesion.     Seasonal allergies - home meds  HLD - home zetia ordered.      FULL CODE     FUNCTIONAL STATUS PRIOR TO HOSPITALIZATION (including history of recent falls): Ambulates without asistance     Signed By: Rowan Sales MD      2019                 Patient Demographics     Patient Name  Kathy Pleitez  [id number] Sex  Female   1945 Address  [address] [place], [place] [address] Phone  [phone] (Home)  [phone] (Mobile) *Preferred*   CSN:   [id number]   Admit Date: Admit Time Room Bed   2019  2:15  [27090] 01 [65123]   Attending Providers     Provider Pager From To   Stephen Jimenez MD  19   Wes Louise MD  19    Emergency Contact(s)     Name Relation Home Work Mobile   Kassie Briones Daughter [phone]   Devan Scherer Daughter [phone]     Utilization Reviews         Dizziness - Care Day 1 (2019) by Rigoberto Bergman         Review Entered Review Status   2019 09:00 Completed       Criteria Review      Care Day: 1 Care Date: 2019 Level of Care: Inpatient Floor    Guideline Day 1    Clinical Status    ( ) * Clinical Indications met[C]    Routes    (X) IV fluids, medications    2019 08:59:47 EDT by Nisreen Casanova      Ns 1000ml bolus iv x2    Interventions    (X) Possible brain imaging (eg, MRI)    2019 08:59:47 EDT by Nisreen Casanova      Head CT IMPRESSION:   1. No acute intracranial hemorrhage.     2. Age-indeterminate mild microvascular ischemic disease in the periventricular  white matter.     (X) Possible cardiac monitoring and neurologic checks    2019 08:59:47 EDT by Nisreen Casanova      continuous vs monitoring    Medications    (X) Symptomatic medication as indicated    2019 08:59:47 EDT by Nisreen Casanova      Antivert 25mg po x3    * Milestone   Additional Notes   7/18/19    LOC OBS    98.5 83 16 96% 175/92    Results for Bharathi Mg (MRN [id number]) as of 7/19/2019 08:51    7/18/2019 14:40    RBC 3.68 (L)    .2 (H)    NEUTROPHILS 78 (H)    IMMATURE GRANULOCYTES 1 (H)    ABS. LYMPHOCYTES 0.6 (L)    Glucose 116 (H)    Head CT IMPRESSION:    1. No acute intracranial hemorrhage.         2. Age-indeterminate mild microvascular ischemic disease in the periventricular    white matter.         EKG Normal sinus rhythm    When compared with ECG of 26-FEB-2009 10:46,    Zofran 4 mg iv x2         Kassandra Sen is a 66 y.o. female with pmh of HLD, seasonal allergies, who presents with dizziness, nausea and vomiting since yesterday morning. Her dizziness only occurs with movement or standing, and additionally she feels unsteady on her feet like she is going to fall. She has had an episode of dizziness in the past, and associated vertigo. She has had a few episodes of NBNB emesis, last vomiting occurred today. Has not been able to tolerate any solid foods, but able to tolerate liquids. Otherwise denies any F/C/URI symptoms/abdominal pain/constipation. Had a few episodes of diarrhea which occurred during her episodes of vomiting. At baseline she ambulates without assistance, and is very active, plays tennis. Assessment:         Active Problems:      Dizziness (7/18/2019)                   Plan:         Dizziness, nausea, vomiting - likely secondary to vertigo    - Meclizine 25mg every 6 hours    - Monitor neurologic checks every 4 hours    - PRN zofran, compazine    - Tylenol as needed for headache    - Hopefully home tomorrow with improvement in symptoms.    - No focal neurologic signs, no indication for further imaging.  CT scan negative on admission for intracranial lesion.         Seasonal allergies - home meds    HLD - home zetia ordered.         FULL CODE       Dizziness - Clinical Indications for Admission to Inpatient Care by Rochelle Childs     Review Entered Review Status   7/19/2019 08:58 Completed       Criteria Review      Clinical Indications for Admission to Inpatient Care    Most Recent : Arnol Alcala Most Recent Date: 7/19/2019 08:58:10 EDT

## 2019-07-19 NOTE — DISCHARGE INSTRUCTIONS
Patient Education        Dizziness: Care Instructions  Your Care Instructions  Dizziness is the feeling of unsteadiness or fuzziness in your head. It is different than having vertigo, which is a feeling that the room is spinning or that you are moving or falling. It is also different from lightheadedness, which is the feeling that you are about to faint. It can be hard to know what causes dizziness. Some people feel dizzy when they have migraine headaches. Sometimes bouts of flu can make you feel dizzy. Some medical conditions, such as heart problems or high blood pressure, can make you feel dizzy. Many medicines can cause dizziness, including medicines for high blood pressure, pain, or anxiety. If a medicine causes your symptoms, your doctor may recommend that you stop or change the medicine. If it is a problem with your heart, you may need medicine to help your heart work better. If there is no clear reason for your symptoms, your doctor may suggest watching and waiting for a while to see if the dizziness goes away on its own. Follow-up care is a key part of your treatment and safety. Be sure to make and go to all appointments, and call your doctor if you are having problems. It's also a good idea to know your test results and keep a list of the medicines you take. How can you care for yourself at home? · If your doctor recommends or prescribes medicine, take it exactly as directed. Call your doctor if you think you are having a problem with your medicine. · Do not drive while you feel dizzy. · Try to prevent falls. Steps you can take include:  ? Using nonskid mats, adding grab bars near the tub, and using night-lights. ? Clearing your home so that walkways are free of anything you might trip on.  ? Letting family and friends know that you have been feeling dizzy. This will help them know how to help you. When should you call for help? Call 911 anytime you think you may need emergency care.  For example, call if:    · You passed out (lost consciousness).     · You have dizziness along with symptoms of a heart attack. These may include:  ? Chest pain or pressure, or a strange feeling in the chest.  ? Sweating. ? Shortness of breath. ? Nausea or vomiting. ? Pain, pressure, or a strange feeling in the back, neck, jaw, or upper belly or in one or both shoulders or arms. ? Lightheadedness or sudden weakness. ? A fast or irregular heartbeat.     · You have symptoms of a stroke. These may include:  ? Sudden numbness, tingling, weakness, or loss of movement in your face, arm, or leg, especially on only one side of your body. ? Sudden vision changes. ? Sudden trouble speaking. ? Sudden confusion or trouble understanding simple statements. ? Sudden problems with walking or balance. ? A sudden, severe headache that is different from past headaches.    Call your doctor now or seek immediate medical care if:    · You feel dizzy and have a fever, headache, or ringing in your ears.     · You have new or increased nausea and vomiting.     · Your dizziness does not go away or comes back.    Watch closely for changes in your health, and be sure to contact your doctor if:    · You do not get better as expected. Where can you learn more? Go to http://eldon-dileep.info/. Enter K795 in the search box to learn more about \"Dizziness: Care Instructions. \"  Current as of: September 23, 2018  Content Version: 11.9  © 2477-1707 [employer]. Care instructions adapted under license by [employer] (which disclaims liability or warranty for this information). If you have questions about a medical condition or this instruction, always ask your healthcare professional. [place] [address] any warranty or liability for your use of this information.          Patient Education        Vertigo: Care Instructions  Your Care Instructions    Vertigo is the feeling that you or your surroundings are moving when there is no actual movement. It is often described as a feeling of spinning, whirling, falling, or tilting. Vertigo may make you vomit or feel nauseated. You may have trouble standing or walking and may lose your balance. Vertigo is often related to an inner ear problem, but it can have other more serious causes. If vertigo continues, you may need more tests to find its cause. Follow-up care is a key part of your treatment and safety. Be sure to make and go to all appointments, and call your doctor if you are having problems. It's also a good idea to know your test results and keep a list of the medicines you take. How can you care for yourself at home? · Do not lie flat on your back. Prop yourself up slightly. This may reduce the spinning feeling. Keep your eyes open. · Move slowly so that you do not fall. · If your doctor recommends medicine, take it exactly as directed. · Do not drive while you are having vertigo. Certain exercises, called Villarreal-Daroff exercises, can help decrease vertigo. To do Villarreal-Daroff exercises:  · Sit on the edge of a bed or sofa and quickly lie down on the side that causes the worst vertigo. Lie on your side with your ear down. · Stay in this position for at least 30 seconds or until the vertigo goes away. · Sit up. If this causes vertigo, wait for it to stop. · Repeat the procedure on the other side. · Repeat this 10 times. Do these exercises 2 times a day until the vertigo is gone. When should you call for help? Call 911 anytime you think you may need emergency care. For example, call if:    · You passed out (lost consciousness).     · You have symptoms of a stroke. These may include:  ? Sudden numbness, tingling, weakness, or loss of movement in your face, arm, or leg, especially on only one side of your body. ? Sudden vision changes. ? Sudden trouble speaking. ? Sudden confusion or trouble understanding simple statements.   ? Sudden problems with walking or balance. ? A sudden, severe headache that is different from past headaches.    Call your doctor now or seek immediate medical care if:    · Vertigo occurs with a fever, a headache, or ringing in your ears.     · You have new or increased nausea and vomiting.    Watch closely for changes in your health, and be sure to contact your doctor if:    · Vertigo gets worse or happens more often.     · Vertigo has not gotten better after 2 weeks. Where can you learn more? Go to http://eldon-dileep.info/. Enter C847 in the search box to learn more about \"Vertigo: Care Instructions. \"  Current as of: March 27, 2018  Content Version: 11.9  © 3556-3114 [employer]. Care instructions adapted under license by [employer] (which disclaims liability or warranty for this information). If you have questions about a medical condition or this instruction, always ask your healthcare professional. [place] [address] any warranty or liability for your use of this information. Discharge Instructions       PATIENT ID: Ana Benton  MRN: [id number]   YOB: 1945    DATE OF ADMISSION: 7/18/2019  2:15 PM    DATE OF DISCHARGE: 7/19/2019    PRIMARY CARE PROVIDER: Addison Carrera MD     ATTENDING PHYSICIAN: Mariano Mallory*  DISCHARGING PROVIDER: Aditi Estrada MD    To contact this individual call [phone] and ask the  to page. If unavailable ask to be transferred the Adult Hospitalist Department.     DISCHARGE DIAGNOSES   BBPV - vertigo    CONSULTATIONS: None    PROCEDURES/SURGERIES: * No surgery found *    PENDING TEST RESULTS:   At the time of discharge the following test results are still pending: None    FOLLOW UP APPOINTMENTS:   Follow-up Information     Follow up With Specialties Details Why Contact Info    Addison Carrera MD Internal Medicine In 1 week  [address]  [address] [employer] [address] [facility] ENT  In 1 week Call to schedule a follow up appt for vertigo, in case your symptoms do not improve. [id number]:   1. Please take all medications as prescribed. Note changes as below. - Add meclizine 25mg every 6 hours for the first 24 hours, then as needed thereafter.   - Use zofran and then compazine if you have nausea or vomiting that is not controlled by meclizine. 2. Please make sure to follow up with your primary care physician within 1-2 weeks of discharge for hospital follow up. You should also call an ENT physician to make a follow up appointment. 3. You should notice improvement in the next 24-48 hours. I suggest staying with someone in the interim to help, until you are back on your feet and back to your normal self. 4. Avoid tobacco, alcohol, and other illicit drug use. Avoid drinking until your symtpoms resolved. Avoid sudden head movements, avoid close reading, close iphone use, until your symptoms have resolved. DIET: Regular Diet    ACTIVITY: Activity as tolerated    WOUND CARE: None    EQUIPMENT needed: None        DISCHARGE MEDICATIONS:   See Medication Reconciliation Form    · It is important that you take the medication exactly as they are prescribed. · Keep your medication in the bottles provided by the pharmacist and keep a list of the medication names, dosages, and times to be taken in your wallet. · Do not take other medications without consulting your doctor. NOTIFY YOUR PHYSICIAN FOR ANY OF THE FOLLOWING:   Fever over 101 degrees for 24 hours. Chest pain, shortness of breath, fever, chills, nausea, vomiting, diarrhea, change in mentation, falling, weakness, bleeding. Severe pain or pain not relieved by medications. Or, any other signs or symptoms that you may have questions about.       DISPOSITION:  X  Home With:   OT  PT  [place]  RN SNF/Inpatient Rehab/LTAC    Independent/assisted living    Hospice    Other:     CDMP Checked: Yes X     PROBLEM LIST Updated:   Yes X       Signed:   Amanda Huitron MD  7/19/2019  9:33 AM

## 2019-07-19 NOTE — PROGRESS NOTES
I have reviewed discharge instructions with the patient. The patient verbalized understanding.  Patient leaving with copy of discharge instructions, 3 prescriptions, personal belongings; patient signed electronically; I assisted patient to discharge area to meet daughter and leaving by car

## 2019-07-26 ENCOUNTER — OFFICE VISIT (OUTPATIENT)
Dept: INTERNAL MEDICINE CLINIC | Age: 74
End: 2019-07-26

## 2019-07-26 VITALS
SYSTOLIC BLOOD PRESSURE: 138 MMHG | RESPIRATION RATE: 16 BRPM | WEIGHT: 133.8 LBS | HEART RATE: 74 BPM | OXYGEN SATURATION: 97 % | DIASTOLIC BLOOD PRESSURE: 70 MMHG | BODY MASS INDEX: 22.84 KG/M2 | TEMPERATURE: 98.2 F | HEIGHT: 64 IN

## 2019-07-26 DIAGNOSIS — R42 VERTIGO: Primary | ICD-10-CM

## 2019-07-26 RX ORDER — ASPIRIN 81 MG/1
TABLET ORAL DAILY
COMMUNITY
End: 2020-07-31

## 2019-07-26 RX ORDER — EZETIMIBE 10 MG/1
TABLET ORAL
Refills: 3 | COMMUNITY
Start: 2019-07-08 | End: 2019-12-05 | Stop reason: SDUPTHER

## 2019-07-26 NOTE — PROGRESS NOTES
HPI:  Rosi Sanchez is a 66y.o. year old female who returns to clinic today for follow up: Recent hospitalization for dizziness nausea and vomiting. Felt likely secondary to BPPV. She was discharged home on meclizine. Neurologic evaluation showed no focal signs and CT scan negative. She notes since discharge home her symptoms have improved at least by 90%. Still have some mild symptoms of vertigo which is constant. She has been seen by Dr Ama Lobato in ENT and. She was just started on prednisone 4 days ago for vertigo. Will obtain records. Nausea resolved. Current Outpatient Medications   Medication Sig Dispense Refill    fluticasone propionate (FLONASE NA) by Nasal route.  ezetimibe (ZETIA) 10 mg tablet TK 1 T PO QD  3    aspirin delayed-release 81 mg tablet Take  by mouth daily.  meclizine (ANTIVERT) 25 mg tablet Take 1 Tab by mouth three (3) times daily as needed for Dizziness for up to 10 days. 25 Tab 0    betamethasone valerate (LUXIQ) 0.12 % topical foam MARGO FOAM EXT TO THE SCALP BID FOR RASH  1    CHOLECALCIFEROL, VITAMIN D3, (VITAMIN D3 PO) Take 400 Units by mouth daily.  PV W-O MARIEL/FERROUS FUMARATE/FA (M-VIT PO) Take  by mouth. Takes one multivitamin per day.  CALCIUM CARBONATE (CALCIUM 300 PO) Take 300 mg by mouth daily. Allergies   Allergen Reactions    Lipitor [Atorvastatin] Myalgia    Oxycodone Other (comments)     Unable to walk, \"completely knocks me out\"    Sulfa (Sulfonamide Antibiotics) Hives    Zocor [Simvastatin] Myalgia     Social History     Tobacco Use    Smoking status: Former Smoker     Types: Cigarettes     Last attempt to quit: 1987     Years since quittin.5    Smokeless tobacco: Never Used    Tobacco comment: former cigarette smoker   Substance Use Topics    Alcohol use:  Yes     Alcohol/week: 2.0 standard drinks     Types: 1 Glasses of wine, 1 Standard drinks or equivalent per week         Review of Systems   HENT: Negative for sinus pain and sore throat. Respiratory: Negative for shortness of breath. Cardiovascular: Negative for chest pain and leg swelling. Gastrointestinal: Negative for abdominal pain. Neurological: Negative for headaches. Physical Exam   Constitutional: She appears well-developed. No distress. HENT:   Head: Normocephalic and atraumatic. Neck: Carotid bruit is not present. Cardiovascular: Normal rate, regular rhythm, normal heart sounds and intact distal pulses. No murmur heard. Pulmonary/Chest: Effort normal and breath sounds normal. She has no wheezes. Abdominal: Soft. Bowel sounds are normal. She exhibits no distension and no mass. There is no tenderness. Musculoskeletal: She exhibits no edema. Lymphadenopathy:     She has no cervical adenopathy. Psychiatric: She has a normal mood and affect. Nursing note and vitals reviewed. Visit Vitals  /70   Pulse 74   Temp 98.2 °F (36.8 °C) (Oral)   Resp 16   Ht 5' 4\" (1.626 m)   Wt 133 lb 12.8 oz (60.7 kg)   SpO2 97%   BMI 22.97 kg/m²       Assessment & Plan:    ICD-10-CM ICD-9-CM    1. Vertigo R42 780.4    Symptoms revolving on current treatment plan. Keep follow-up with ENT. Advised her to call back or return to office if symptoms worsen/change/persist.  Discussed expected course/resolution/complications of diagnosis in detail with patient. Medication risks/benefits/costs/interactions/alternatives discussed with patient. She was given an after visit summary which includes diagnoses, current medications, & vitals. She expressed understanding with the diagnosis and plan.

## 2019-12-02 ENCOUNTER — TELEPHONE (OUTPATIENT)
Dept: INTERNAL MEDICINE CLINIC | Age: 74
End: 2019-12-02

## 2019-12-02 DIAGNOSIS — E78.00 HYPERCHOLESTEREMIA: Primary | ICD-10-CM

## 2019-12-02 DIAGNOSIS — R73.09 ELEVATED GLUCOSE: ICD-10-CM

## 2019-12-02 DIAGNOSIS — E78.00 HYPERCHOLESTEREMIA: ICD-10-CM

## 2019-12-02 DIAGNOSIS — D75.89 MACROCYTOSIS: ICD-10-CM

## 2019-12-05 ENCOUNTER — OFFICE VISIT (OUTPATIENT)
Dept: INTERNAL MEDICINE CLINIC | Age: 74
End: 2019-12-05

## 2019-12-05 VITALS
WEIGHT: 130 LBS | TEMPERATURE: 97.7 F | RESPIRATION RATE: 16 BRPM | OXYGEN SATURATION: 97 % | SYSTOLIC BLOOD PRESSURE: 122 MMHG | BODY MASS INDEX: 22.2 KG/M2 | DIASTOLIC BLOOD PRESSURE: 86 MMHG | HEIGHT: 64 IN | HEART RATE: 89 BPM

## 2019-12-05 DIAGNOSIS — N95.1 VAGINAL DRYNESS, MENOPAUSAL: ICD-10-CM

## 2019-12-05 DIAGNOSIS — E78.00 HYPERCHOLESTEROLEMIA: ICD-10-CM

## 2019-12-05 DIAGNOSIS — S99.921A INJURY OF RIGHT FOOT, INITIAL ENCOUNTER: ICD-10-CM

## 2019-12-05 DIAGNOSIS — Z00.00 MEDICARE ANNUAL WELLNESS VISIT, SUBSEQUENT: Primary | ICD-10-CM

## 2019-12-05 DIAGNOSIS — M85.89 OSTEOPENIA OF MULTIPLE SITES: ICD-10-CM

## 2019-12-05 DIAGNOSIS — Z13.39 SCREENING FOR ALCOHOLISM: ICD-10-CM

## 2019-12-05 DIAGNOSIS — R73.09 ELEVATED GLUCOSE: ICD-10-CM

## 2019-12-05 DIAGNOSIS — Z13.31 SCREENING FOR DEPRESSION: ICD-10-CM

## 2019-12-05 PROBLEM — R42 DIZZINESS: Status: RESOLVED | Noted: 2019-07-18 | Resolved: 2019-12-05

## 2019-12-05 PROBLEM — R42 VERTIGO: Status: RESOLVED | Noted: 2019-07-19 | Resolved: 2019-12-05

## 2019-12-05 LAB
ALBUMIN SERPL-MCNC: 4.6 G/DL (ref 3.5–4.8)
ALBUMIN/GLOB SERPL: 1.8 {RATIO} (ref 1.2–2.2)
ALP SERPL-CCNC: 87 IU/L (ref 39–117)
ALT SERPL-CCNC: 16 IU/L (ref 0–32)
AST SERPL-CCNC: 25 IU/L (ref 0–40)
BILIRUB SERPL-MCNC: 0.3 MG/DL (ref 0–1.2)
BUN SERPL-MCNC: 15 MG/DL (ref 8–27)
BUN/CREAT SERPL: 20 (ref 12–28)
CALCIUM SERPL-MCNC: 9.9 MG/DL (ref 8.7–10.3)
CHLORIDE SERPL-SCNC: 99 MMOL/L (ref 96–106)
CHOLEST SERPL-MCNC: 249 MG/DL (ref 100–199)
CO2 SERPL-SCNC: 25 MMOL/L (ref 20–29)
CREAT SERPL-MCNC: 0.74 MG/DL (ref 0.57–1)
EST. AVERAGE GLUCOSE BLD GHB EST-MCNC: 108 MG/DL
GLOBULIN SER CALC-MCNC: 2.5 G/DL (ref 1.5–4.5)
GLUCOSE SERPL-MCNC: 89 MG/DL (ref 65–99)
HBA1C MFR BLD: 5.4 % (ref 4.8–5.6)
HDLC SERPL-MCNC: 82 MG/DL
LDLC SERPL CALC-MCNC: 151 MG/DL (ref 0–99)
POTASSIUM SERPL-SCNC: 4.7 MMOL/L (ref 3.5–5.2)
PROT SERPL-MCNC: 7.1 G/DL (ref 6–8.5)
SODIUM SERPL-SCNC: 143 MMOL/L (ref 134–144)
TRIGL SERPL-MCNC: 81 MG/DL (ref 0–149)
VIT B12 SERPL-MCNC: 1557 PG/ML (ref 232–1245)
VLDLC SERPL CALC-MCNC: 16 MG/DL (ref 5–40)

## 2019-12-05 RX ORDER — EZETIMIBE 10 MG/1
TABLET ORAL
Qty: 90 TAB | Refills: 3 | Status: SHIPPED | OUTPATIENT
Start: 2019-12-05 | End: 2021-03-04 | Stop reason: SDUPTHER

## 2019-12-05 NOTE — PATIENT INSTRUCTIONS
Medicare Wellness Visit, Female     The best way to live healthy is to have a lifestyle where you eat a well-balanced diet, exercise regularly, limit alcohol use, and quit all forms of tobacco/nicotine, if applicable. Regular preventive services are another way to keep healthy. Preventive services (vaccines, screening tests, monitoring & exams) can help personalize your care plan, which helps you manage your own care. Screening tests can find health problems at the earliest stages, when they are easiest to treat. [facility] follows the current, evidence-based guidelines published by the [facility] Ilan Cuellar ([facility]) when recommending preventive services for our patients. Because we follow these guidelines, sometimes recommendations change over time as research supports it. (For example, mammograms used to be recommended annually. Even though Medicare will still pay for an annual mammogram, the newer guidelines recommend a mammogram every two years for women of average risk). Of course, you and your doctor may decide to screen more often for some diseases, based on your risk and your co-morbidities (chronic disease you are already diagnosed with). Preventive services for you include:  - Medicare offers their members a free annual wellness visit, which is time for you and your primary care provider to discuss and plan for your preventive service needs. Take advantage of this benefit every year!  -All adults over the age of 72 should receive the recommended pneumonia vaccines. Current USPSTF guidelines recommend a series of two vaccines for the best pneumonia protection.   -All adults should have a flu vaccine yearly and a tetanus vaccine every 10 years.   -All adults age 48 and older should receive the shingles vaccines (series of two vaccines).       -All adults age 38-68 who are overweight should have a diabetes screening test once every three years.   -All adults born between [birth year] and 1965 should be screened once for Hepatitis C.  -Other screening tests and preventive services for persons with diabetes include: an eye exam to screen for diabetic retinopathy, a kidney function test, a foot exam, and stricter control over your cholesterol.   -Cardiovascular screening for adults with routine risk involves an electrocardiogram (ECG) at intervals determined by your doctor.   -Colorectal cancer screenings should be done for adults age 54-65 with no increased risk factors for colorectal cancer. There are a number of acceptable methods of screening for this type of cancer. Each test has its own benefits and drawbacks. Discuss with your doctor what is most appropriate for you during your annual wellness visit. The different tests include: colonoscopy (considered the best screening method), a fecal occult blood test, a fecal DNA test, and sigmoidoscopy.    -A bone mass density test is recommended when a woman turns 65 to screen for osteoporosis. This test is only recommended one time, as a screening. Some providers will use this same test as a disease monitoring tool if you already have osteoporosis. -Breast cancer screenings are recommended every other year for women of normal risk, age 54-69.  -Cervical cancer screenings for women over age 72 are only recommended with certain risk factors. Here is a list of your current Health Maintenance items (your personalized list of preventive services) with a due date: There are no preventive care reminders to display for this patient.

## 2019-12-05 NOTE — TELEPHONE ENCOUNTER
Reviewed labs with patient at office visit. Cholesterol significantly improved with Zetia. Continue with lifestyle modifications and Zetia.

## 2019-12-05 NOTE — PROGRESS NOTES
This is the Subsequent Medicare Annual Wellness Exam, performed 12 months or more after the Initial AWV or the last Subsequent AWV    I have reviewed the patient's medical history in detail and updated the computerized patient record. History     Patient Active Problem List   Diagnosis Code    Osteopenia M85.80    Allergic rhinitis J30.9    Hypercholesterolemia E78.00     Past Medical History:   Diagnosis Date    Allergic rhinitis, cause unspecified 12/17/2009    Anemia NEC     Benign neoplasm of colon     Hypercholesterolemia     Osteopenia 12/17/2009      Past Surgical History:   Procedure Laterality Date    BREAST SURGERY PROCEDURE UNLISTED      left & rt. bx - benign    COLONOSCOPY N/A 11/30/2017    COLONOSCOPY performed by Yonathan Rojo MD at [facility]. [address] 77, COLON, DIAGNOSTIC  7/06    due repeat 2011 per note    HX BREAST BIOPSY Left 1990's    benign    HX BREAST BIOPSY Right 1980's    benign    HX BUNIONECTOMY      HX HYSTERECTOMY      partial    HX ORTHOPAEDIC      ACL - right knee    HX ORTHOPAEDIC  2014    right foot surgery    HX ORTHOPAEDIC  2015    left thumb surgery    HX TONSILLECTOMY      US GUIDED CORE BREAST BIOPSY Left 1990's    benign     Current Outpatient Medications   Medication Sig Dispense Refill    fluticasone propionate (FLONASE NA) by Nasal route.  ezetimibe (ZETIA) 10 mg tablet TK 1 T PO QD  3    aspirin delayed-release 81 mg tablet Take  by mouth daily.  betamethasone valerate (LUXIQ) 0.12 % topical foam MARGO FOAM EXT TO THE SCALP BID FOR RASH  1    CHOLECALCIFEROL, VITAMIN D3, (VITAMIN D3 PO) Take 400 Units by mouth daily.  PV W-O MARIEL/FERROUS FUMARATE/FA (M-VIT PO) Take  by mouth. Takes one multivitamin per day.  CALCIUM CARBONATE (CALCIUM 300 PO) Take 300 mg by mouth daily.        Allergies   Allergen Reactions    Lipitor [Atorvastatin] Myalgia    Oxycodone Other (comments)     Unable to walk, \"completely knocks me out\"  Sulfa (Sulfonamide Antibiotics) Hives    Zocor [Simvastatin] Myalgia       Family History   Problem Relation Age of Onset    Breast Cancer Mother 48    Heart Disease Father     Breast Cancer Maternal Grandmother     Other Brother         TIA    Coronary Artery Disease Paternal Uncle      Social History     Tobacco Use    Smoking status: Former Smoker     Types: Cigarettes     Last attempt to quit: 1987     Years since quittin.9    Smokeless tobacco: Never Used    Tobacco comment: former cigarette smoker   Substance Use Topics    Alcohol use: Yes     Alcohol/week: 2.0 standard drinks     Types: 1 Glasses of wine, 1 Standard drinks or equivalent per week       Depression Risk Factor Screening:     3 most recent PHQ Screens 2019   Little interest or pleasure in doing things Not at all   Feeling down, depressed, irritable, or hopeless Not at all   Total Score PHQ 2 0       Alcohol Risk Factor Screening:   Do you average 1 drink per night or more than 7 drinks a week:  No    On any one occasion in the past three months have you have had more than 3 drinks containing alcohol:  No      Functional Ability and Level of Safety:   Hearing: Hearing is good. Activities of Daily Living: The home contains: no safety equipment. Patient does total self care    Ambulation: with no difficulty    Fall Risk:  Fall Risk Assessment, last 12 mths 2019   Able to walk? Yes   Fall in past 12 months? No       Abuse Screen:  Patient is not abused    Cognitive Screening     Cognitive Screening: normal    Patient Care Team   Patient Care Team:  Viraj Berry MD as PCP - General Naveen Villar MD as PCP - REHABILITATION [facility] Empaneled Provider  Tin Massey MD (Hematology and Oncology)    Assessment/Plan   Education and counseling provided:  Are appropriate based on today's review and evaluation    There are no preventive care reminders to display for this patient.   HPI:  Naveen Edge is also here today for follow up: Hypercholesterolemia, osteopenia, allergies. 1.  Vaginal dryness: She would also like to discuss a topical vaginal hormone that she had been started on by physician at [facility]. It was started due to an episode of vaginitis with significant vaginal dryness. .  Since starting the vaginal topical hormone therapy, which I believe is Vagifem and she is going to double check this and send me a [employer] message, she has not had any further problems with vaginitis. 2. She also reports right foot tendon complete tear. Pain resolved ortho following and doing PT and feels ok. Wears brace playing tennis. No falls. Not doing other exercise at this time. 3.  Hypercholesterolemia: Tolerating Zetia well and reviewed lab work with her today and LDL is significantly improved. She is following a healthy diet. 4.  Osteopenia:  Taking calcium and vit D,  last bone density reviewed. No falls. 5.  B12 elevation on recent lab work and taking supplement daily. Instructed to stop the B12 supplement. Current Outpatient Medications   Medication Sig Dispense Refill    fluticasone propionate (FLONASE NA) by Nasal route.  ezetimibe (ZETIA) 10 mg tablet TK 1 T PO QD  3    aspirin delayed-release 81 mg tablet Take  by mouth daily.  betamethasone valerate (LUXIQ) 0.12 % topical foam MARGO FOAM EXT TO THE SCALP BID FOR RASH  1    CHOLECALCIFEROL, VITAMIN D3, (VITAMIN D3 PO) Take 400 Units by mouth daily.  PV W-O MARIEL/FERROUS FUMARATE/FA (M-VIT PO) Take  by mouth. Takes one multivitamin per day.  CALCIUM CARBONATE (CALCIUM 300 PO) Take 300 mg by mouth daily.        Allergies   Allergen Reactions    Lipitor [Atorvastatin] Myalgia    Oxycodone Other (comments)     Unable to walk, \"completely knocks me out\"    Sulfa (Sulfonamide Antibiotics) Hives    Zocor [Simvastatin] Myalgia     Social History     Tobacco Use    Smoking status: Former Smoker     Types: Cigarettes     Last attempt to quit: 1/1/1987     Years since quittin.9    Smokeless tobacco: Never Used    Tobacco comment: former cigarette smoker   Substance Use Topics    Alcohol use: Yes     Alcohol/week: 2.0 standard drinks     Types: 1 Glasses of wine, 1 Standard drinks or equivalent per week         Review of Systems   Constitutional: Negative for chills, fever and malaise/fatigue. HENT: Negative for congestion and sore throat. Eyes: Negative for blurred vision and double vision. Respiratory: Negative for cough, shortness of breath and wheezing. Cardiovascular: Negative for chest pain, palpitations and leg swelling. Gastrointestinal: Negative for abdominal pain, blood in stool, constipation, diarrhea, heartburn, nausea and vomiting. Genitourinary: Negative for dysuria, frequency and urgency. Musculoskeletal: Negative for back pain, joint pain and myalgias. Skin: Negative for rash. Neurological: Negative for dizziness, sensory change, focal weakness and headaches. Psychiatric/Behavioral: Negative for depression. The patient is not nervous/anxious and does not have insomnia. Physical Exam  Vitals signs and nursing note reviewed. Constitutional:       General: She is not in acute distress. Appearance: She is well-developed. HENT:      Head: Normocephalic and atraumatic. Right Ear: Tympanic membrane and ear canal normal.      Left Ear: Tympanic membrane and ear canal normal.      Nose: Nose normal.   Eyes:      Conjunctiva/sclera: Conjunctivae normal.      Pupils: Pupils are equal, round, and reactive to light. Neck:      Musculoskeletal: Normal range of motion. Thyroid: No thyromegaly. Cardiovascular:      Rate and Rhythm: Normal rate and regular rhythm. Heart sounds: Normal heart sounds. No murmur. Pulmonary:      Effort: Pulmonary effort is normal.      Breath sounds: Normal breath sounds.    Chest:      Comments: Breast exam: breasts appear normal, no suspicious masses, no skin or nipple changes or axillary nodes.   Abdominal:      General: Bowel sounds are normal.      Palpations: Abdomen is soft. There is no mass. Tenderness: There is no tenderness. Lymphadenopathy:      Cervical: No cervical adenopathy. Skin:     Findings: No rash. Neurological:      Cranial Nerves: No cranial nerve deficit. Sensory: No sensory deficit. Visit Vitals  /86   Pulse 89   Temp 97.7 °F (36.5 °C) (Oral)   Resp 16   Ht 5' 4\" (1.626 m)   Wt 130 lb (59 kg)   SpO2 97%   BMI 22.31 kg/m²       Assessment & Plan:    ICD-10-CM ICD-9-CM    1. Medicare annual wellness visit, subsequent  Health maintenance reviewed and updated with patient today at visit. Z00.00 V70.0    2. Screening for alcoholism Z13.39 V79.1 DC ANNUAL ALCOHOL SCREEN 15 MIN   3. Screening for depression Z13.31 V79.0 DEPRESSION SCREEN ANNUAL   4. Hypercholesterolemia  LDL improved. Continue with Zetia and lifestyle management E78.00 272.0    5. Osteopenia of multiple sites  Continue with calcium, vitamin D, exercise and reviewed last bone density today. M85.89 733.90    6. Elevated glucose  Normal blood glucose and A1c level today. R73.09 790.29    7. Injury of right foot, initial encounter  Continue with management as per orthopedics. H97.135C 959.7    8. Vaginal dryness, menopausal  She will send me the name of the topical HRT and will continue this for her. Did review she does have family history of breast cancer but I certainly think is low risk for her to be on this topical vaginal.  N95.1 627.2       Follow-up in 1 year     Advised her to call back or return to office if symptoms worsen/change/persist.  Discussed expected course/resolution/complications of diagnosis in detail with patient. Medication risks/benefits/costs/interactions/alternatives discussed with patient. She was given an after visit summary which includes diagnoses, current medications, & vitals. She expressed understanding with the diagnosis and plan.

## 2019-12-09 ENCOUNTER — TELEPHONE (OUTPATIENT)
Dept: INTERNAL MEDICINE CLINIC | Age: 74
End: 2019-12-09

## 2019-12-09 RX ORDER — ESTRADIOL 10 UG/1
10 INSERT VAGINAL
Qty: 24 TAB | Refills: 3 | Status: SHIPPED | OUTPATIENT
Start: 2019-12-09 | End: 2020-10-13 | Stop reason: SDUPTHER

## 2019-12-09 NOTE — TELEPHONE ENCOUNTER
----- Message from Supa Saldivar sent at 12/5/2019  1:18 PM EST -----  Regarding: RE: Prescription Question  Contact: [phone]  Can you take over the responsibility for my prescription for Estradiol Vaginal Inserts, USP, 10 mcg. I only have 3 refills before 10/26/20. Since the prescription is a quantity of 16, that is not enough to last until 10/26/20    That would be a big help.  ----- Message -----  From: Masood Polanco MD  Sent: 12/2/2019 12:31 PM EST  To: El Nieves  Subject: RE: Prescription Question  Your lab work has been ordered, and you can  your lab slip at our . Please let me know if you have any questions.         ----- Message -----     From: El Nieves     Sent: 11/28/2019 10:11 AM EST       To: Masood Polanco MD  Subject: RE: Prescription Question    I need an order for blood work for my upcoming appointment. Can you please let me know when I can  the order. Thanks  ----- Message -----  From: Masood Polanco MD  Sent: 11/4/2019 11:21 AM EST  To: El Nieves  Subject: RE: Prescription Question  How often are you using the vaginal tablet insert? It is usually used no more than 2-3 times per week. I am happy to send a 1 month refill and discuss this medication with you further at your December visit. However I need to know how often you are taking.  Migel Acevedo     ----- Message -----     From: El Nieves     Sent: 10/28/2019  7:31 AM EDT       To: Masood Polanco MD  Subject: RE: Prescription Question    Can you call in one prescription for the vaginal tabs to hold me until my visit in December. ----- Message -----  From: Caroline Johnson LPN  Sent: 43/45/8415  3:37 PM EDT  To: El Nieves  Subject: RE: Prescription Question  Dr. Sivakumar Silva will be out of the office next week but I will send her this message so she will get it when she gets back.     ----- Message -----     From: El Nieves     Sent: 10/25/2019  3:26 PM EDT       To: Masood Polanco, MD  Subject: RE: Prescription Question    [facility]. She has not returned [facility] request for refill. I think she was in the emergency department for women. It was for a discharge caused by vaginal dryness and has nothing to do with sexual activity. Hope this helps.      ----- Message -----  From: Ezra Shah MD  Sent: 10/23/2019  8:38 AM EDT  To: Martita Qureshi  Subject: RE: Prescription Question  I need to obtain the records from the physician that prescribed the medication to better understand what they are treating. Can you give us the name of the physician so that we can request the records. Also you may need to sign a release but we will try without that first.  Are you currently having a vaginal discharge? How long have you been on this medication? Are you having vaginal dryness or painful intercourse as 1 of the reasons why this was started? Have you ever tried a trial off of the medication to see if your vaginal discharge returned? This medication is usually used for vaginal dryness and postmenopausal women. It looks like you have an appointment scheduled for December and we can certainly discuss this at that appointment. I would like to get your records however. Best Regards,   Coit Oka     ----- Message -----     From: Martita Qureshi     Sent: 10/22/2019 10:13 PM EDT       To: Ezra Shah MD  Subject: Prescription Question    I was given a prescription for Estradiol Vaginal Insert, USP, 10 mcg by a doctor at [address] for a vaginal discharge. Evidentially the doctor is no longer with [facility] and I cannot get a refill on the prescription. Is there anyway you can call in a prescription to [facility] on [address], [address] [facility].     Thanks    Herrera Sagastume

## 2020-03-11 ENCOUNTER — HOSPITAL ENCOUNTER (OUTPATIENT)
Dept: MAMMOGRAPHY | Age: 75
Discharge: HOME | End: 2020-03-11
Attending: INTERNAL MEDICINE
Payer: MEDICARE

## 2020-03-11 DIAGNOSIS — Z12.31 VISIT FOR SCREENING MAMMOGRAM: ICD-10-CM

## 2020-03-11 PROCEDURE — 77063 BREAST TOMOSYNTHESIS BI: CPT

## 2020-06-24 RX ORDER — EZETIMIBE 10 MG/1
TABLET ORAL
Qty: 90 TAB | Refills: 3 | OUTPATIENT
Start: 2020-06-24

## 2020-06-25 ENCOUNTER — HOSPITAL ENCOUNTER (OUTPATIENT)
Dept: CT IMAGING | Age: 75
Discharge: HOME | End: 2020-06-25
Attending: ORTHOPAEDIC SURGERY
Payer: MEDICARE

## 2020-06-25 DIAGNOSIS — S63.502A SPRAIN OF LEFT WRIST, INITIAL ENCOUNTER: ICD-10-CM

## 2020-06-25 PROCEDURE — 73200 CT UPPER EXTREMITY W/O DYE: CPT

## 2020-07-27 ENCOUNTER — TELEPHONE (OUTPATIENT)
Dept: INTERNAL MEDICINE CLINIC | Age: 75
End: 2020-07-27

## 2020-07-31 ENCOUNTER — TELEPHONE (OUTPATIENT)
Dept: INTERNAL MEDICINE CLINIC | Age: 75
End: 2020-07-31

## 2020-07-31 ENCOUNTER — OFFICE VISIT (OUTPATIENT)
Dept: INTERNAL MEDICINE CLINIC | Age: 75
End: 2020-07-31

## 2020-07-31 VITALS
SYSTOLIC BLOOD PRESSURE: 140 MMHG | HEIGHT: 64 IN | BODY MASS INDEX: 21 KG/M2 | DIASTOLIC BLOOD PRESSURE: 80 MMHG | WEIGHT: 123 LBS | OXYGEN SATURATION: 98 % | RESPIRATION RATE: 18 BRPM | HEART RATE: 98 BPM | TEMPERATURE: 97.8 F

## 2020-07-31 DIAGNOSIS — E53.8 B12 DEFICIENCY: ICD-10-CM

## 2020-07-31 DIAGNOSIS — F10.10 ETOH ABUSE: ICD-10-CM

## 2020-07-31 DIAGNOSIS — R53.83 FATIGUE, UNSPECIFIED TYPE: ICD-10-CM

## 2020-07-31 DIAGNOSIS — S22.41XD CLOSED FRACTURE OF MULTIPLE RIBS OF RIGHT SIDE WITH ROUTINE HEALING, SUBSEQUENT ENCOUNTER: ICD-10-CM

## 2020-07-31 DIAGNOSIS — F32.2 CURRENT SEVERE EPISODE OF MAJOR DEPRESSIVE DISORDER WITHOUT PSYCHOTIC FEATURES WITHOUT PRIOR EPISODE (HCC): Primary | ICD-10-CM

## 2020-07-31 DIAGNOSIS — M54.50 ACUTE BILATERAL LOW BACK PAIN WITHOUT SCIATICA: ICD-10-CM

## 2020-07-31 DIAGNOSIS — M79.631 RIGHT FOREARM PAIN: ICD-10-CM

## 2020-07-31 RX ORDER — DOCUSATE SODIUM 100 MG
CAPSULE ORAL
COMMUNITY
Start: 2020-07-14 | End: 2020-09-01

## 2020-07-31 RX ORDER — SENNOSIDES 8.6 MG
TABLET ORAL
COMMUNITY
Start: 2020-07-14 | End: 2020-09-01

## 2020-07-31 RX ORDER — METHOCARBAMOL 500 MG/1
TABLET, FILM COATED ORAL
COMMUNITY
Start: 2020-07-14 | End: 2020-09-01

## 2020-07-31 RX ORDER — SERTRALINE HYDROCHLORIDE 50 MG/1
TABLET, FILM COATED ORAL
Qty: 30 TAB | Refills: 0 | Status: SHIPPED | OUTPATIENT
Start: 2020-07-31 | End: 2020-08-25 | Stop reason: SDUPTHER
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
## 2020-07-31 NOTE — TELEPHONE ENCOUNTER
Kassie Briones (EC) [phone] (H)     pt's daughter is calling regarding her mother's appt this afternoon today at 4:00 and would like for you to use her mobile number which is [phone]

## 2020-08-02 LAB
ALBUMIN SERPL-MCNC: 4.3 G/DL (ref 3.7–4.7)
ALBUMIN/GLOB SERPL: 2 {RATIO} (ref 1.2–2.2)
ALP SERPL-CCNC: 168 IU/L (ref 39–117)
ALT SERPL-CCNC: 11 IU/L (ref 0–32)
AST SERPL-CCNC: 17 IU/L (ref 0–40)
BASOPHILS # BLD AUTO: 0 X10E3/UL (ref 0–0.2)
BASOPHILS NFR BLD AUTO: 1 %
BILIRUB SERPL-MCNC: 0.3 MG/DL (ref 0–1.2)
BUN SERPL-MCNC: 11 MG/DL (ref 8–27)
BUN/CREAT SERPL: 12 (ref 12–28)
CALCIUM SERPL-MCNC: 9.5 MG/DL (ref 8.7–10.3)
CHLORIDE SERPL-SCNC: 98 MMOL/L (ref 96–106)
CO2 SERPL-SCNC: 23 MMOL/L (ref 20–29)
CREAT SERPL-MCNC: 0.89 MG/DL (ref 0.57–1)
EOSINOPHIL # BLD AUTO: 0.1 X10E3/UL (ref 0–0.4)
EOSINOPHIL NFR BLD AUTO: 2 %
ERYTHROCYTE [DISTWIDTH] IN BLOOD BY AUTOMATED COUNT: 12.7 % (ref 11.7–15.4)
GLOBULIN SER CALC-MCNC: 2.2 G/DL (ref 1.5–4.5)
GLUCOSE SERPL-MCNC: ABNORMAL MG/DL
HCT VFR BLD AUTO: 36.2 % (ref 34–46.6)
HGB BLD-MCNC: 12.1 G/DL (ref 11.1–15.9)
IMM GRANULOCYTES # BLD AUTO: 0 X10E3/UL (ref 0–0.1)
IMM GRANULOCYTES NFR BLD AUTO: 0 %
LYMPHOCYTES # BLD AUTO: 0.9 X10E3/UL (ref 0.7–3.1)
LYMPHOCYTES NFR BLD AUTO: 28 %
MCH RBC QN AUTO: 32.9 PG (ref 26.6–33)
MCHC RBC AUTO-ENTMCNC: 33.4 G/DL (ref 31.5–35.7)
MCV RBC AUTO: 98 FL (ref 79–97)
MONOCYTES # BLD AUTO: 0.4 X10E3/UL (ref 0.1–0.9)
MONOCYTES NFR BLD AUTO: 13 %
NEUTROPHILS # BLD AUTO: 1.6 X10E3/UL (ref 1.4–7)
NEUTROPHILS NFR BLD AUTO: 56 %
PLATELET # BLD AUTO: 312 X10E3/UL (ref 150–450)
POTASSIUM SERPL-SCNC: ABNORMAL MMOL/L
PROT SERPL-MCNC: 6.5 G/DL (ref 6–8.5)
RBC # BLD AUTO: 3.68 X10E6/UL (ref 3.77–5.28)
SODIUM SERPL-SCNC: 142 MMOL/L (ref 134–144)
TSH SERPL DL<=0.005 MIU/L-ACNC: 0.55 UIU/ML (ref 0.45–4.5)
VIT B12 SERPL-MCNC: >2000 PG/ML (ref 232–1245)
WBC # BLD AUTO: 3 X10E3/UL (ref 3.4–10.8)

## 2020-08-10 DIAGNOSIS — D72.819 LEUKOPENIA, UNSPECIFIED TYPE: ICD-10-CM

## 2020-08-10 DIAGNOSIS — R74.8 ELEVATED ALKALINE PHOSPHATASE LEVEL: Primary | ICD-10-CM

## 2020-08-10 NOTE — PROGRESS NOTES
[employer] message sent. Lab normal except for slightly low white count and mild elevation of alk phos. Likely related to recent stress and trauma. Plan to repeat lab work in 1 month. We will also add glucose and potassium as these were not done on her recent lab work. Lab work ordered.

## 2020-08-25 DIAGNOSIS — F32.2 CURRENT SEVERE EPISODE OF MAJOR DEPRESSIVE DISORDER WITHOUT PSYCHOTIC FEATURES WITHOUT PRIOR EPISODE (HCC): Primary | ICD-10-CM

## 2020-08-25 NOTE — TELEPHONE ENCOUNTER
Requested Prescriptions     Pending Prescriptions Disp Refills    sertraline (ZOLOFT) 50 mg tablet 30 Tab 0     Si/2 tablet every morning for 1 week with food and then increase to 1 tablet every morning with food.      REQUESTING A 90 DAY SUPPLY

## 2020-08-26 RX ORDER — SERTRALINE HYDROCHLORIDE 50 MG/1
TABLET, FILM COATED ORAL
Qty: 30 TAB | Refills: 0 | Status: SHIPPED | OUTPATIENT
Start: 2020-08-26 | End: 2020-09-01 | Stop reason: SDUPTHER

## 2020-09-01 ENCOUNTER — OFFICE VISIT (OUTPATIENT)
Dept: INTERNAL MEDICINE CLINIC | Age: 75
End: 2020-09-01
Payer: MEDICARE

## 2020-09-01 VITALS
RESPIRATION RATE: 16 BRPM | HEIGHT: 64 IN | BODY MASS INDEX: 21.99 KG/M2 | WEIGHT: 128.8 LBS | SYSTOLIC BLOOD PRESSURE: 136 MMHG | DIASTOLIC BLOOD PRESSURE: 80 MMHG | TEMPERATURE: 97.3 F | OXYGEN SATURATION: 97 % | HEART RATE: 67 BPM

## 2020-09-01 DIAGNOSIS — K76.89 LIVER CYST: ICD-10-CM

## 2020-09-01 DIAGNOSIS — Z23 ENCOUNTER FOR IMMUNIZATION: ICD-10-CM

## 2020-09-01 DIAGNOSIS — F32.A MILD DEPRESSION: Primary | ICD-10-CM

## 2020-09-01 DIAGNOSIS — F10.11 H/O ETOH ABUSE: ICD-10-CM

## 2020-09-01 PROCEDURE — 1101F PT FALLS ASSESS-DOCD LE1/YR: CPT | Performed by: INTERNAL MEDICINE

## 2020-09-01 PROCEDURE — G8399 PT W/DXA RESULTS DOCUMENT: HCPCS | Performed by: INTERNAL MEDICINE

## 2020-09-01 PROCEDURE — 99214 OFFICE O/P EST MOD 30 MIN: CPT | Performed by: INTERNAL MEDICINE

## 2020-09-01 PROCEDURE — G8420 CALC BMI NORM PARAMETERS: HCPCS | Performed by: INTERNAL MEDICINE

## 2020-09-01 PROCEDURE — G8536 NO DOC ELDER MAL SCRN: HCPCS | Performed by: INTERNAL MEDICINE

## 2020-09-01 PROCEDURE — G0008 ADMIN INFLUENZA VIRUS VAC: HCPCS | Performed by: INTERNAL MEDICINE

## 2020-09-01 PROCEDURE — G8427 DOCREV CUR MEDS BY ELIG CLIN: HCPCS | Performed by: INTERNAL MEDICINE

## 2020-09-01 PROCEDURE — 1090F PRES/ABSN URINE INCON ASSESS: CPT | Performed by: INTERNAL MEDICINE

## 2020-09-01 PROCEDURE — G8432 DEP SCR NOT DOC, RNG: HCPCS | Performed by: INTERNAL MEDICINE

## 2020-09-01 PROCEDURE — 90694 VACC AIIV4 NO PRSRV 0.5ML IM: CPT | Performed by: INTERNAL MEDICINE

## 2020-09-01 RX ORDER — NEOMYCIN SULFATE, POLYMYXIN B SULFATE, AND DEXAMETHASONE 3.5; 10000; 1 MG/G; [USP'U]/G; MG/G
OINTMENT OPHTHALMIC
COMMUNITY
Start: 2020-08-25 | End: 2021-02-25

## 2020-09-01 RX ORDER — GABAPENTIN 100 MG/1
100 CAPSULE ORAL
COMMUNITY
Start: 2020-08-28 | End: 2022-08-04

## 2020-09-01 RX ORDER — SERTRALINE HYDROCHLORIDE 50 MG/1
50 TABLET, FILM COATED ORAL DAILY
Qty: 30 TAB | Refills: 0 | Status: SHIPPED | OUTPATIENT
Start: 2020-09-01 | End: 2020-11-22

## 2020-09-01 RX ORDER — NALTREXONE HYDROCHLORIDE 50 MG/1
50 TABLET, FILM COATED ORAL DAILY
COMMUNITY
End: 2020-09-11 | Stop reason: SDUPTHER

## 2020-09-01 RX ORDER — ACAMPROSATE CALCIUM 333 MG/1
666 TABLET, DELAYED RELEASE ORAL 3 TIMES DAILY
COMMUNITY
End: 2020-10-08 | Stop reason: SDUPTHER

## 2020-09-01 RX ORDER — ASPIRIN 81 MG/1
TABLET ORAL DAILY
COMMUNITY
End: 2022-08-04

## 2020-09-01 RX ORDER — FEXOFENADINE HCL AND PSEUDOEPHEDRINE HCI 60; 120 MG/1; MG/1
1 TABLET, EXTENDED RELEASE ORAL EVERY 12 HOURS
COMMUNITY
End: 2022-08-04
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
## 2020-09-01 NOTE — PATIENT INSTRUCTIONS
Influenza Virus Vaccine (By injection)   Influenza Virus Vaccine (in-floo-EN-za VYE-torie VAX-een)  Helps prevent infection with influenza (flu) virus. Brand Name(s): Afluria 4665-8349 Formula, Michael Cosier [id number] Formula, Afluria Quadrivalent 3609-2531 Formula, Afluria Quadrivalent 8649-6209 Formula, FluLaval Quadrivalent 7256-8304 Formula, FluLaval Quadrivalent 8636-5669 Formula, Fluad 8214-2271 Formula, Fluad 2809-6633 Formula, Fluarix Quadrivalent [id number] Formula, Fluarix Quadrivalent 3765-1663 Formula, Flublok 4521-6443 Formula, Flublok 0943-7529 Formula, Flublok Quadrivalent 9685-4948 Formula, Flucelvax Quadrivalent 6799-3437 Formula, Flucelvax Quadrivalent 5462-2966 Formula   There may be other brand names for this medicine. When This Medicine Should Not Be Used: This vaccine is not right for everyone. You should not receive it if you had an allergic reaction to flu vaccine. If you are allergic to eggs, tell the caregiver who is going to give you the injection. Some brands of this vaccine contain egg proteins and could cause an allergic reaction. How to Use This Medicine:   Injectable  · The vaccine is given as a shot into a muscle or into your skin, usually in the shoulder area. The shot could be given in the thigh for babies and young children. · A nurse or other health provider will give you this medicine. · Read and follow the patient instructions that come with this medicine. Talk to your doctor or pharmacist if you have any questions. · A child who is younger than 5years old and who has not had a flu shot before may need 2 shots. The second shot should be given about 1 month after the first.  · Missed dose: Most people need only 1 dose of the vaccine. If your child needs a second dose, it is important for the vaccine to be given on schedule. If you must cancel an appointment, make a new one right away.   Drugs and Foods to Avoid:   Ask your doctor or pharmacist before using any other medicine, including over-the-counter medicines, vitamins, and herbal products. · Tell your doctor if you are using a medicine or treatment that weakens your immune system, such as a steroid, radiation, or cancer treatment. This vaccine may not work as well if you are also using these medicines. However, your doctor may still want you to get the vaccine because it can give you some protection. Warnings While Using This Medicine:   · Tell your doctor if you are pregnant or breastfeeding or if you have a weak immune system. · Tell your doctor if you ever had an unusual reaction to a flu shot, such as Guillain-Barré syndrome, or if you are allergic to latex. · The flu vaccine may not protect everyone who receives it. This vaccine will not treat flu symptoms if you have already been infected with the virus. Possible Side Effects While Using This Medicine:   Call your doctor right away if you notice any of these side effects:  · Allergic reaction: Itching or hives, swelling in your face or hands, swelling or tingling in your mouth or throat, chest tightness, trouble breathing  · Fainting, dizziness, or lightheadedness  · Fever over 103 degrees F  · Seizures  · Severe muscle weakness  If you notice these less serious side effects, talk with your doctor:   · Headache, muscle pain, tiredness  · Irritability or crying (in a child)  · Redness, pain, swelling, soreness, or a lump where the shot was given  If you notice other side effects that you think are caused by this medicine, tell your doctor. Call your doctor for medical advice about side effects. You may report side effects to FDA at 7-824-HJY-3834  © 2017 [facility] Information is for End User's use only and may not be sold, redistributed or otherwise used for commercial purposes. The above information is an  only. It is not intended as medical advice for individual conditions or treatments.  Talk to your doctor, nurse or pharmacist before following any medical regimen to see if it is safe and effective for you.

## 2020-09-12 RX ORDER — NALTREXONE HYDROCHLORIDE 50 MG/1
50 TABLET, FILM COATED ORAL DAILY
Qty: 90 TAB | Refills: 1 | Status: SHIPPED | OUTPATIENT
Start: 2020-09-12 | End: 2021-03-04 | Stop reason: SDUPTHER

## 2020-10-02 ENCOUNTER — TELEPHONE (OUTPATIENT)
Dept: INTERNAL MEDICINE CLINIC | Age: 75
End: 2020-10-02

## 2020-10-02 RX ORDER — VALACYCLOVIR HYDROCHLORIDE 1 G/1
2000 TABLET, FILM COATED ORAL 2 TIMES DAILY
Qty: 8 TAB | Refills: 0 | Status: SHIPPED | OUTPATIENT
Start: 2020-10-02 | End: 2020-10-03

## 2020-10-02 NOTE — TELEPHONE ENCOUNTER
----- Message from Danny Coburn, RN sent at 9/28/2020  2:16 PM EDT -----  Regarding: FW: Prescription Question  Contact: [phone]  Has not filled in a while, not an active med  ----- Message -----  From: Carisa Lobato  Sent: 9/25/2020   7:16 PM EDT  To: [facility]  Subject: Prescription Question                            I need a refill of Valacyclovir 1GM. I have taken all of them.

## 2020-10-11 RX ORDER — ACAMPROSATE CALCIUM 333 MG/1
666 TABLET, DELAYED RELEASE ORAL 3 TIMES DAILY
Qty: 180 TAB | Refills: 0 | Status: SHIPPED | OUTPATIENT
Start: 2020-10-11 | End: 2020-11-02

## 2020-10-11 RX ORDER — GABAPENTIN 100 MG/1
100 CAPSULE ORAL DAILY
Qty: 30 CAP | OUTPATIENT
Start: 2020-10-11

## 2020-10-15 ENCOUNTER — TELEPHONE (OUTPATIENT)
Dept: INTERNAL MEDICINE CLINIC | Age: 75
End: 2020-10-15

## 2020-10-15 ENCOUNTER — OFFICE VISIT (OUTPATIENT)
Dept: INTERNAL MEDICINE CLINIC | Age: 75
End: 2020-10-15
Payer: MEDICARE

## 2020-10-15 VITALS
SYSTOLIC BLOOD PRESSURE: 120 MMHG | OXYGEN SATURATION: 96 % | TEMPERATURE: 98 F | HEART RATE: 80 BPM | HEIGHT: 64 IN | BODY MASS INDEX: 20.83 KG/M2 | DIASTOLIC BLOOD PRESSURE: 80 MMHG | RESPIRATION RATE: 16 BRPM | WEIGHT: 122 LBS

## 2020-10-15 DIAGNOSIS — F32.A DEPRESSION, CONTROLLED: Primary | ICD-10-CM

## 2020-10-15 DIAGNOSIS — F10.11 H/O ETOH ABUSE: ICD-10-CM

## 2020-10-15 DIAGNOSIS — K76.89 LIVER CYST: ICD-10-CM

## 2020-10-15 PROCEDURE — 1100F PTFALLS ASSESS-DOCD GE2>/YR: CPT | Performed by: INTERNAL MEDICINE

## 2020-10-15 PROCEDURE — 99214 OFFICE O/P EST MOD 30 MIN: CPT | Performed by: INTERNAL MEDICINE

## 2020-10-15 PROCEDURE — G8510 SCR DEP NEG, NO PLAN REQD: HCPCS | Performed by: INTERNAL MEDICINE

## 2020-10-15 PROCEDURE — G8427 DOCREV CUR MEDS BY ELIG CLIN: HCPCS | Performed by: INTERNAL MEDICINE

## 2020-10-15 PROCEDURE — G8399 PT W/DXA RESULTS DOCUMENT: HCPCS | Performed by: INTERNAL MEDICINE

## 2020-10-15 PROCEDURE — G8536 NO DOC ELDER MAL SCRN: HCPCS | Performed by: INTERNAL MEDICINE

## 2020-10-15 PROCEDURE — 1090F PRES/ABSN URINE INCON ASSESS: CPT | Performed by: INTERNAL MEDICINE

## 2020-10-15 PROCEDURE — 3288F FALL RISK ASSESSMENT DOCD: CPT | Performed by: INTERNAL MEDICINE

## 2020-10-15 PROCEDURE — G8420 CALC BMI NORM PARAMETERS: HCPCS | Performed by: INTERNAL MEDICINE

## 2020-10-15 NOTE — PROGRESS NOTES
HPI:  Oskar Ochoa is a 66y.o. year old female who returns to clinic today for follow up: Depression, history of alcohol abuse, history of liver cyst.  She states there is some increased stress over her upcoming court date which has not been set yet. She denies feeling depressed or anxious. States she is sleeping well. Tolerating Zoloft with no side effects. No suicidal ideation. She denies any alcohol use and continues with her AA and counseling for support. She continues on acamprosate and naltrexone with no side effects or problems. Current Outpatient Medications   Medication Sig Dispense Refill    acamprosate (CAMPRAL) 333 mg tablet Take 2 Tabs by mouth three (3) times daily. 180 Tab 0    naltrexone (DEPADE) 50 mg tablet Take 1 Tab by mouth daily. 90 Tab 1    gabapentin (NEURONTIN) 100 mg capsule Take 100 mg by mouth daily.  neomycin-polymyxin-dexamethasone (DEXACINE) 3.5 mg/g-10,000 unit/g-0.1 % ophthalmic ointment       aspirin delayed-release 81 mg tablet Take  by mouth daily.  fexofenadine-pseudoephedrine (Allegra-D 12 Hour)  mg Tb12 Take 1 Tab by mouth every twelve (12) hours.  sertraline (ZOLOFT) 50 mg tablet Take 1 Tab by mouth daily. 30 Tab 0    estradiol (VAGIFEM) 10 mcg tab vaginal tablet Insert 1 Tab into vagina every Monday and Thursday. 24 Tab 3    ezetimibe (ZETIA) 10 mg tablet TK 1 T PO QD 90 Tab 3    fluticasone propionate (FLONASE NA) by Nasal route.  betamethasone valerate (LUXIQ) 0.12 % topical foam MARGO FOAM EXT TO THE SCALP BID FOR RASH  1    CHOLECALCIFEROL, VITAMIN D3, (VITAMIN D3 PO) Take 400 Units by mouth daily.  PV W-O MARIEL/FERROUS FUMARATE/FA (M-VIT PO) Take  by mouth. Takes one multivitamin per day.  CALCIUM CARBONATE (CALCIUM 300 PO) Take 300 mg by mouth daily.        Allergies   Allergen Reactions    Lipitor [Atorvastatin] Myalgia    Oxycodone Other (comments)     Unable to walk, \"completely knocks me out\"    Sulfa (Sulfonamide Antibiotics) Hives    Zocor [Simvastatin] Myalgia     Social History     Tobacco Use    Smoking status: Former Smoker     Types: Cigarettes     Last attempt to quit: 1987     Years since quittin.8    Smokeless tobacco: Never Used    Tobacco comment: former cigarette smoker   Substance Use Topics    Alcohol use: Yes     Alcohol/week: 2.0 standard drinks     Types: 1 Glasses of wine, 1 Standard drinks or equivalent per week         Review of Systems   Respiratory: Negative for shortness of breath. Cardiovascular: Negative for chest pain and leg swelling. Gastrointestinal: Negative for abdominal pain, constipation, diarrhea and nausea. Psychiatric/Behavioral: The patient is not nervous/anxious and does not have insomnia. Physical Exam  Vitals signs and nursing note reviewed. Constitutional:       General: She is not in acute distress. Appearance: She is well-developed. HENT:      Head: Normocephalic and atraumatic. Cardiovascular:      Rate and Rhythm: Normal rate and regular rhythm. Pulmonary:      Effort: Pulmonary effort is normal.      Breath sounds: Normal breath sounds. No wheezing. Abdominal:      General: Bowel sounds are normal. There is no distension. Palpations: Abdomen is soft. There is no mass. Tenderness: There is no abdominal tenderness. Psychiatric:         Mood and Affect: Mood normal.       Visit Vitals  /80 (BP 1 Location: Right arm, BP Patient Position: Sitting)   Pulse 80   Temp 98 °F (36.7 °C) (Temporal)   Resp 16   Ht 5' 4\" (1.626 m)   Wt 122 lb (55.3 kg)   SpO2 96%   BMI 20.94 kg/m²       Assessment & Plan:    ICD-10-CM ICD-9-CM    1. Depression, controlled   Continue current medication. F32.9 311    2. H/O ETOH abuse   Continue with AA and counseling services. Continue with naltrexone and Campral as per her inpatient alcohol rehab recommendations. F10.11 305.03    3.  Liver cyst   Reviewed abdominal CT scan and will obtain abdominal ultrasound to further evaluate. K76.89 573.8 US ABD LTD     Orders Placed This Encounter    US ABD LTD       Follow-up in 2 months earlier if any acute worsening. Advised her to call back or return to office if symptoms worsen/change/persist.  Discussed expected course/resolution/complications of diagnosis in detail with patient. Medication risks/benefits/costs/interactions/alternatives discussed with patient. She was given an after visit summary which includes diagnoses, current medications, & vitals. She expressed understanding with the diagnosis and plan.

## 2020-10-15 NOTE — TELEPHONE ENCOUNTER
Patient saw Dr. Abhay Klein today and forgot to mention the refill she needs for estradiol (VAGIFEM) 10 mcg tab vaginal tablet -- sent a CSS99 message yesterday.

## 2020-10-23 ENCOUNTER — HOSPITAL ENCOUNTER (OUTPATIENT)
Dept: ULTRASOUND IMAGING | Age: 75
Discharge: HOME | End: 2020-10-23
Attending: INTERNAL MEDICINE
Payer: MEDICARE

## 2020-10-23 DIAGNOSIS — K76.89 LIVER CYST: ICD-10-CM

## 2020-10-23 PROCEDURE — 76705 ECHO EXAM OF ABDOMEN: CPT

## 2020-12-17 ENCOUNTER — OFFICE VISIT (OUTPATIENT)
Dept: INTERNAL MEDICINE CLINIC | Age: 75
End: 2020-12-17
Payer: MEDICARE

## 2020-12-17 VITALS
DIASTOLIC BLOOD PRESSURE: 85 MMHG | OXYGEN SATURATION: 94 % | BODY MASS INDEX: 21.34 KG/M2 | TEMPERATURE: 97.3 F | HEIGHT: 64 IN | WEIGHT: 125 LBS | HEART RATE: 65 BPM | RESPIRATION RATE: 16 BRPM | SYSTOLIC BLOOD PRESSURE: 133 MMHG

## 2020-12-17 DIAGNOSIS — F32.A DEPRESSION, CONTROLLED: ICD-10-CM

## 2020-12-17 DIAGNOSIS — E78.00 HYPERCHOLESTEROLEMIA: ICD-10-CM

## 2020-12-17 DIAGNOSIS — F10.11 H/O ETOH ABUSE: ICD-10-CM

## 2020-12-17 DIAGNOSIS — Z13.39 SCREENING FOR ALCOHOLISM: ICD-10-CM

## 2020-12-17 DIAGNOSIS — E53.8 B12 DEFICIENCY: ICD-10-CM

## 2020-12-17 DIAGNOSIS — Z00.00 MEDICARE ANNUAL WELLNESS VISIT, SUBSEQUENT: Primary | ICD-10-CM

## 2020-12-17 PROBLEM — F33.2 DEPRESSION, MAJOR, SEVERE RECURRENCE (HCC): Status: ACTIVE | Noted: 2020-12-17

## 2020-12-17 PROCEDURE — 1090F PRES/ABSN URINE INCON ASSESS: CPT | Performed by: INTERNAL MEDICINE

## 2020-12-17 PROCEDURE — G8536 NO DOC ELDER MAL SCRN: HCPCS | Performed by: INTERNAL MEDICINE

## 2020-12-17 PROCEDURE — G0439 PPPS, SUBSEQ VISIT: HCPCS | Performed by: INTERNAL MEDICINE

## 2020-12-17 PROCEDURE — G0442 ANNUAL ALCOHOL SCREEN 15 MIN: HCPCS | Performed by: INTERNAL MEDICINE

## 2020-12-17 PROCEDURE — 1100F PTFALLS ASSESS-DOCD GE2>/YR: CPT | Performed by: INTERNAL MEDICINE

## 2020-12-17 PROCEDURE — G8510 SCR DEP NEG, NO PLAN REQD: HCPCS | Performed by: INTERNAL MEDICINE

## 2020-12-17 PROCEDURE — G8427 DOCREV CUR MEDS BY ELIG CLIN: HCPCS | Performed by: INTERNAL MEDICINE

## 2020-12-17 PROCEDURE — 3288F FALL RISK ASSESSMENT DOCD: CPT | Performed by: INTERNAL MEDICINE

## 2020-12-17 PROCEDURE — 99214 OFFICE O/P EST MOD 30 MIN: CPT | Performed by: INTERNAL MEDICINE

## 2020-12-17 PROCEDURE — G8420 CALC BMI NORM PARAMETERS: HCPCS | Performed by: INTERNAL MEDICINE

## 2020-12-17 PROCEDURE — G8399 PT W/DXA RESULTS DOCUMENT: HCPCS | Performed by: INTERNAL MEDICINE

## 2020-12-17 RX ORDER — MONTELUKAST SODIUM 4 MG/1
TABLET, CHEWABLE ORAL
COMMUNITY
Start: 2020-11-30 | End: 2022-08-04

## 2020-12-17 RX ORDER — SERTRALINE HYDROCHLORIDE 50 MG/1
TABLET, FILM COATED ORAL
Qty: 90 TAB | Refills: 1 | Status: SHIPPED | OUTPATIENT
Start: 2020-12-17 | End: 2021-03-04 | Stop reason: SDUPTHER

## 2020-12-17 NOTE — PROGRESS NOTES
This is the Subsequent Medicare Annual Wellness Exam, performed 12 months or more after the Initial AWV or the last Subsequent AWV    I have reviewed the patient's medical history in detail and updated the computerized patient record. Depression Risk Factor Screening:     3 most recent PHQ Screens 12/17/2020   PHQ Not Done -   Little interest or pleasure in doing things Not at all   Feeling down, depressed, irritable, or hopeless Not at all   Total Score PHQ 2 0   Trouble falling or staying asleep, or sleeping too much -   Feeling tired or having little energy -   Poor appetite, weight loss, or overeating -   Feeling bad about yourself - or that you are a failure or have let yourself or your family down -   Trouble concentrating on things such as school, work, reading, or watching TV -   Moving or speaking so slowly that other people could have noticed; or the opposite being so fidgety that others notice -   Thoughts of being better off dead, or hurting yourself in some way -   PHQ 9 Score -   How difficult have these problems made it for you to do your work, take care of your home and get along with others -       Alcohol Risk Screen    Do you average more than 1 drink per night or more than 7 drinks a week:  No    On any one occasion in the past three months have you have had more than 3 drinks containing alcohol:  No        Functional Ability and Level of Safety:    Hearing: Hearing is good. Activities of Daily Living: The home contains: no safety equipment. Patient does total self care      Ambulation: with no difficulty     Fall Risk:  Fall Risk Assessment, last 12 mths 12/17/2020   Able to walk? Yes   Fall in past 12 months? Yes   Fall with injury?  Yes   Number of falls in past 12 months 2   Fall Risk Score 3      Abuse Screen:  Patient is not abused       Cognitive Screening    Has your family/caregiver stated any concerns about your memory: no     Cognitive Screening: normal    Assessment/Plan Education and counseling provided:  Are appropriate based on today's review and evaluation    Health Maintenance Due     Health Maintenance Due   Topic Date Due    Shingrix Vaccine Age 49> (2 of 2) 09/12/2019       Patient Care Team   Patient Care Team:  Mike Adame MD as PCP - [facility] Elizabeth Griffin MD as PCP - [facility]  Deshawn Rodas MD (Hematology and Oncology)    History     Patient Active Problem List   Diagnosis Code    Osteopenia M85.80    Allergic rhinitis J30.9    Hypercholesterolemia E78.00    Vaginal dryness, menopausal N95.1    H/O ETOH abuse F10.11     Past Medical History:   Diagnosis Date    Allergic rhinitis, cause unspecified 12/17/2009    Anemia NEC     Benign neoplasm of colon     Hypercholesterolemia     Osteopenia 12/17/2009      Past Surgical History:   Procedure Laterality Date    BREAST SURGERY PROCEDURE UNLISTED      left & rt. bx - benign    COLONOSCOPY N/A 11/30/2017    COLONOSCOPY performed by Hannah Michel MD at [facility]. [address] 77, COLON, DIAGNOSTIC  7/06    due repeat 2011 per note    HX BREAST BIOPSY Left 1990's    benign    HX BREAST BIOPSY Right 1980's    benign    HX BUNIONECTOMY      HX HYSTERECTOMY      partial    HX ORTHOPAEDIC      ACL - right knee    HX ORTHOPAEDIC  2014    right foot surgery    HX ORTHOPAEDIC  2015    left thumb surgery    HX TONSILLECTOMY      US GUIDED CORE BREAST BIOPSY Left 1990's    benign     Current Outpatient Medications   Medication Sig Dispense Refill    acamprosate (CAMPRAL) 333 mg tablet TAKE 2 TABLETS BY MOUTH 3 TIMES A  Tab 0    sertraline (ZOLOFT) 50 mg tablet TAKE 1 TABLET BY MOUTH EVERY DAY 30 Tab 0    estradioL (VAGIFEM) 10 mcg tab vaginal tablet Insert 1 Tab into vagina every Monday and Thursday. 24 Tab 3    naltrexone (DEPADE) 50 mg tablet Take 1 Tab by mouth daily. 90 Tab 1    gabapentin (NEURONTIN) 100 mg capsule Take 100 mg by mouth daily.       neomycin-polymyxin-dexamethasone (DEXACINE) 3.5 mg/g-10,000 unit/g-0.1 % ophthalmic ointment Apply  to eye DIALYSIS PRN.  aspirin delayed-release 81 mg tablet Take  by mouth daily.  fexofenadine-pseudoephedrine (Allegra-D 12 Hour)  mg Tb12 Take 1 Tab by mouth every twelve (12) hours.  ezetimibe (ZETIA) 10 mg tablet TK 1 T PO QD 90 Tab 3    fluticasone propionate (FLONASE NA) by Nasal route.  betamethasone valerate (LUXIQ) 0.12 % topical foam MARGO FOAM EXT TO THE SCALP BID FOR RASH  1    CHOLECALCIFEROL, VITAMIN D3, (VITAMIN D3 PO) Take 400 Units by mouth daily.  PV W-O MARIEL/FERROUS FUMARATE/FA (M-VIT PO) Take  by mouth. Takes one multivitamin per day.  CALCIUM CARBONATE (CALCIUM 300 PO) Take 300 mg by mouth daily.  colestipoL (COLESTID) 1 gram tablet TAKE 2 TABLETS BY MOUTH TWICE A DAY. (TAKE 1 HOUR AFTER OR 4 HOURS BEFORE OTHER MEDS)       Allergies   Allergen Reactions    Lipitor [Atorvastatin] Myalgia    Oxycodone Other (comments)     Unable to walk, \"completely knocks me out\"    Sulfa (Sulfonamide Antibiotics) Hives    Zocor [Simvastatin] Myalgia       Family History   Problem Relation Age of Onset    Breast Cancer Mother 48    Heart Disease Father     Breast Cancer Maternal Grandmother     Other Brother         TIA    Coronary Artery Disease Paternal Uncle      Social History     Tobacco Use    Smoking status: Former Smoker     Types: Cigarettes     Quit date: 1987     Years since quittin.9    Smokeless tobacco: Never Used    Tobacco comment: former cigarette smoker   Substance Use Topics    Alcohol use: Yes     Alcohol/week: 2.0 standard drinks     Types: 1 Glasses of wine, 1 Standard drinks or equivalent per week   Anatoly Casper is a 66 y.o. female who was seen today for a follow-up visit. Assessment & Plan:   Diagnoses and all orders for this visit:    1.  Medicare annual wellness visit, subsequent  Health maintenance reviewed and updated with patient today at visit. 2. Screening for alcoholism  -     MI ANNUAL ALCOHOL SCREEN 15 MIN    3. Hypercholesterolemia  Check lab work and continue his [address]. Tolerating medication well. -     CBC WITH AUTOMATED DIFF; Future  -     LIPID PANEL; Future  -     METABOLIC PANEL, COMPREHENSIVE; Future    4. Depression, controlled  Well-controlled on current medication  -     sertraline (ZOLOFT) 50 mg tablet; TAKE 1 TABLET BY MOUTH EVERY DAY    5. H/O ETOH abuse  Has not had any alcohol since her accident this past summer. Continue current medications and counseling and AA. 6. B12 deficiency  Check level. Currently off of B12 supplement. Last measurement was good. -     VITAMIN B12; Future    7. Osteopenia and reviewed last bone density  Continue calcium, vitamin D, exercise. current treatment plan is effective, no change in therapy  lab results and schedule of future lab studies reviewed with patient. Subjective:   Ximena Mandujano was seen for Depression, history of alcohol abuse, hyperlipidemia. She states there is some increased stress over her court date which has been delayed to January. She denies feeling depressed or anxious. States she is sleeping well. Tolerating Zoloft with no side effects. No suicidal ideation. She denies any alcohol use and continues with her AA and counseling for support. She continues on acamprosate and naltrexone with no side effects or problems and plan is to continue these for 1 year as per rehab center. .    Current Outpatient Medications   Medication Sig Dispense Refill    acamprosate (CAMPRAL) 333 mg tablet TAKE 2 TABLETS BY MOUTH 3 TIMES A  Tab 0    sertraline (ZOLOFT) 50 mg tablet TAKE 1 TABLET BY MOUTH EVERY DAY 30 Tab 0    estradioL (VAGIFEM) 10 mcg tab vaginal tablet Insert 1 Tab into vagina every Monday and Thursday. 24 Tab 3    naltrexone (DEPADE) 50 mg tablet Take 1 Tab by mouth daily.  90 Tab 1    gabapentin (NEURONTIN) 100 mg capsule Take 100 mg by mouth daily.  neomycin-polymyxin-dexamethasone (DEXACINE) 3.5 mg/g-10,000 unit/g-0.1 % ophthalmic ointment Apply  to eye DIALYSIS PRN.  aspirin delayed-release 81 mg tablet Take  by mouth daily.  fexofenadine-pseudoephedrine (Allegra-D 12 Hour)  mg Tb12 Take 1 Tab by mouth every twelve (12) hours.  ezetimibe (ZETIA) 10 mg tablet TK 1 T PO QD 90 Tab 3    fluticasone propionate (FLONASE NA) by Nasal route.  betamethasone valerate (LUXIQ) 0.12 % topical foam MARGO FOAM EXT TO THE SCALP BID FOR RASH  1    CHOLECALCIFEROL, VITAMIN D3, (VITAMIN D3 PO) Take 400 Units by mouth daily.  PV W-O MARIEL/FERROUS FUMARATE/FA (M-VIT PO) Take  by mouth. Takes one multivitamin per day.  CALCIUM CARBONATE (CALCIUM 300 PO) Take 300 mg by mouth daily.  colestipoL (COLESTID) 1 gram tablet TAKE 2 TABLETS BY MOUTH TWICE A DAY. (TAKE 1 HOUR AFTER OR 4 HOURS BEFORE OTHER MEDS)         Review of Systems   Constitutional: Negative for chills, fever and malaise/fatigue. HENT: Negative for congestion and sore throat. Respiratory: Negative for cough, shortness of breath and wheezing. Cardiovascular: Negative for chest pain, palpitations and leg swelling. Gastrointestinal: Negative for abdominal pain, blood in stool, constipation, diarrhea, heartburn, nausea and vomiting. Genitourinary: Negative for dysuria, frequency and urgency. Musculoskeletal: Negative for back pain, joint pain and myalgias. Skin: Negative for rash. Neurological: Negative for dizziness, sensory change, focal weakness and headaches. Psychiatric/Behavioral: Negative for depression and suicidal ideas. Physical Exam  Vitals signs and nursing note reviewed. Constitutional:       General: She is not in acute distress. Appearance: She is well-developed. HENT:      Head: Normocephalic and atraumatic.       Right Ear: Tympanic membrane and ear canal normal.      Left Ear: Tympanic membrane and ear canal normal.      Nose: Nose normal.   Eyes:      Conjunctiva/sclera: Conjunctivae normal.      Pupils: Pupils are equal, round, and reactive to light. Neck:      Musculoskeletal: Normal range of motion. Thyroid: No thyromegaly. Cardiovascular:      Rate and Rhythm: Normal rate and regular rhythm. Heart sounds: Normal heart sounds. No murmur. Pulmonary:      Effort: Pulmonary effort is normal.      Breath sounds: Normal breath sounds. Chest:      Comments: Breast exam: breasts appear normal, no suspicious masses, no skin or nipple changes or axillary nodes. Abdominal:      General: Bowel sounds are normal.      Palpations: Abdomen is soft. There is no mass. Tenderness: There is no abdominal tenderness. Musculoskeletal:      Right lower leg: No edema. Left lower leg: No edema. Lymphadenopathy:      Cervical: No cervical adenopathy. Skin:     Findings: No rash. Neurological:      General: No focal deficit present. Cranial Nerves: No cranial nerve deficit. Sensory: No sensory deficit. Comments: Lower extremity motor bilateral  5- out of 5. Psychiatric:         Mood and Affect: Mood normal.       Visit Vitals  /85 (BP 1 Location: Left arm, BP Patient Position: Sitting)   Pulse 65   Temp 97.3 °F (36.3 °C) (Temporal)   Resp 16   Ht 5' 4\" (1.626 m)   Wt 125 lb (56.7 kg)   SpO2 94%   BMI 21.46 kg/m²          Aspects of this note may have been generated using voice recognition software. Despite editing, there may be some syntax errors   I have discussed the diagnosis with the patient and the intended plan as seen in the above orders. The patient has received an after-visit summary and questions were answered concerning future plans. I have discussed any recommended medication side effects and warnings with the patient as well.   She has expressed understanding of the diagnosis and plan    Abena Gasca MD

## 2020-12-17 NOTE — PATIENT INSTRUCTIONS

## 2021-01-13 RX ORDER — GABAPENTIN 100 MG/1
100 CAPSULE ORAL DAILY
OUTPATIENT
Start: 2021-01-13

## 2021-01-25 ENCOUNTER — HOSPITAL ENCOUNTER (OUTPATIENT)
Dept: PREADMISSION TESTING | Age: 76
Discharge: HOME | End: 2021-01-25
Payer: MEDICARE

## 2021-01-25 ENCOUNTER — TRANSCRIBE ORDER (OUTPATIENT)
Dept: REGISTRATION | Age: 76
End: 2021-01-25

## 2021-01-25 DIAGNOSIS — Z01.812 PRE-PROCEDURE LAB EXAM: ICD-10-CM

## 2021-01-25 DIAGNOSIS — Z01.812 PRE-PROCEDURE LAB EXAM: Primary | ICD-10-CM

## 2021-01-25 PROCEDURE — U0003 INFECTIOUS AGENT DETECTION BY NUCLEIC ACID (DNA OR RNA); SEVERE ACUTE RESPIRATORY SYNDROME CORONAVIRUS 2 (SARS-COV-2) (CORONAVIRUS DISEASE [COVID-19]), AMPLIFIED PROBE TECHNIQUE, MAKING USE OF HIGH THROUGHPUT TECHNOLOGIES AS DESCRIBED BY CMS-2020-01-R: HCPCS

## 2021-01-26 ENCOUNTER — VIRTUAL VISIT (OUTPATIENT)
Dept: INTERNAL MEDICINE CLINIC | Age: 76
End: 2021-01-26
Payer: MEDICARE

## 2021-01-26 DIAGNOSIS — F41.9 ANXIETY: ICD-10-CM

## 2021-01-26 DIAGNOSIS — F32.A DEPRESSION, CONTROLLED: ICD-10-CM

## 2021-01-26 DIAGNOSIS — G50.0 TRIGEMINAL NEURALGIA: ICD-10-CM

## 2021-01-26 DIAGNOSIS — F51.04 PSYCHOPHYSIOLOGICAL INSOMNIA: Primary | ICD-10-CM

## 2021-01-26 LAB — SARS-COV-2, COV2NT: NOT DETECTED

## 2021-01-26 PROCEDURE — 99214 OFFICE O/P EST MOD 30 MIN: CPT | Performed by: INTERNAL MEDICINE

## 2021-01-26 PROCEDURE — 1100F PTFALLS ASSESS-DOCD GE2>/YR: CPT | Performed by: INTERNAL MEDICINE

## 2021-01-26 PROCEDURE — 1090F PRES/ABSN URINE INCON ASSESS: CPT | Performed by: INTERNAL MEDICINE

## 2021-01-26 PROCEDURE — 3288F FALL RISK ASSESSMENT DOCD: CPT | Performed by: INTERNAL MEDICINE

## 2021-01-26 PROCEDURE — G8399 PT W/DXA RESULTS DOCUMENT: HCPCS | Performed by: INTERNAL MEDICINE

## 2021-01-26 PROCEDURE — G8427 DOCREV CUR MEDS BY ELIG CLIN: HCPCS | Performed by: INTERNAL MEDICINE

## 2021-01-26 PROCEDURE — G9717 DOC PT DX DEP/BP F/U NT REQ: HCPCS | Performed by: INTERNAL MEDICINE

## 2021-01-26 RX ORDER — NEOMYCIN SULFATE, POLYMYXIN B SULFATE AND DEXAMETHASONE 3.5; 10000; 1 MG/ML; [USP'U]/ML; MG/ML
2 SUSPENSION/ DROPS OPHTHALMIC 2 TIMES DAILY
COMMUNITY
End: 2022-08-04

## 2021-01-26 RX ORDER — TRAZODONE HYDROCHLORIDE 50 MG/1
50 TABLET ORAL
Qty: 30 TAB | Refills: 0 | Status: SHIPPED | OUTPATIENT
Start: 2021-01-26 | End: 2021-02-19 | Stop reason: SDUPTHER

## 2021-01-26 NOTE — PROGRESS NOTES
Guillaume Dixon is a 66 y.o. female who was seen by synchronous (real-time) audio-video technology on 1/26/2021. Assessment & Plan:   Diagnoses and all orders for this visit:    1. Psychophysiological insomnia  Reviewed treatment options and I think at this point best medication is trazodone 50 mg nightly. Reviewed potential side effects. She is agreeable to this plan. 2. Anxiety  Has had some situational worsening due to waiting on the sentencing from her trial.  She does not want to increase her Zoloft and feels that her sleep issues the biggest problem right now. Continue with Zoloft for now. Continue with counseling. 3. Depression, controlled  Stable continue with counseling and current medication. 4. Trigeminal neuralgia  Reviewed  and she just received a refill from her neurosurgeon. We will plan to take over management of this problem. We did review doing a trial at some point off of the gabapentin nightly to see if it makes any difference as she is currently having no symptoms. Other orders  -     traZODone (DESYREL) 50 mg tablet; Take 1 Tab by mouth nightly. Subjective:   Guillaume Dixon was seen for Insomnia  1. Insomnia: She reports she is waking up around 2:30 in the morning and having difficult time getting back to sleep. It is really started over the past few weeks since she had her trial and she is now worrying about the upcoming sentencing and states 3/22/21 will hear the verdict on her sentence. She is not drinking alcohol. 2.  Anxiety and depression: She feels increased stress over waiting for her sentencing and this is increased her anxiety but she does not feel she needs to increase her sertraline. Depression has been controlled. 3.  Trigeminal neuralgia and had been followed by her neurosurgeon and treated with gabapentin which she only takes nightly. She is requesting a take over management of this problem.   She is not having any symptoms of trigeminal neuralgia with the current dose of gabapentin 100 mg nightly  [facility] will give her COVID vaccine tomorrow. Prior to Admission medications    Medication Sig Start Date End Date Taking? Authorizing Provider   neomycin-polymyxin-dexamethasone (MAXITROL) ophthalmic suspension Administer 2 Drops to both eyes two (2) times a day. Yes Provider, Historical   acamprosate (CAMPRAL) 333 mg tablet TAKE 2 TABLETS BY MOUTH 3 TIMES A DAY 12/23/20  Yes Janell López MD   colestipoL (COLESTID) 1 gram tablet TAKE 2 TABLETS BY MOUTH TWICE A DAY. (TAKE 1 HOUR AFTER OR 4 HOURS BEFORE OTHER MEDS) 11/30/20  Yes Provider, Historical   sertraline (ZOLOFT) 50 mg tablet TAKE 1 TABLET BY MOUTH EVERY DAY 12/17/20  Yes Janell López MD   estradioL (VAGIFEM) 10 mcg tab vaginal tablet Insert 1 Tab into vagina every Monday and Thursday. 10/15/20  Yes Janell López MD   naltrexone (DEPADE) 50 mg tablet Take 1 Tab by mouth daily. 9/12/20  Yes Janell López MD   gabapentin (NEURONTIN) 100 mg capsule Take 100 mg by mouth daily. 8/28/20  Yes Provider, Historical   aspirin delayed-release 81 mg tablet Take  by mouth daily. Yes Provider, Historical   fexofenadine-pseudoephedrine (Allegra-D 12 Hour)  mg Tb12 Take 1 Tab by mouth every twelve (12) hours. Yes Provider, Historical   ezetimibe (ZETIA) 10 mg tablet TK 1 T PO QD 12/5/19  Yes Janell López MD   fluticasone propionate (FLONASE NA) by Nasal route. Yes Provider, Historical   betamethasone valerate (LUXIQ) 0.12 % topical foam MARGO FOAM EXT TO THE SCALP BID FOR RASH 1/15/19  Yes Provider, Historical   CHOLECALCIFEROL, VITAMIN D3, (VITAMIN D3 PO) Take 400 Units by mouth daily. Yes Provider, Historical   PV W-O MARIEL/FERROUS FUMARATE/FA (M-VIT PO) Take  by mouth. Takes one multivitamin per day. Yes Provider, Historical   CALCIUM CARBONATE (CALCIUM 300 PO) Take 300 mg by mouth daily.    Yes Provider, Historical   neomycin-polymyxin-dexamethasone (DEXACINE) 3.5 mg/g-10,000 unit/g-0.1 % ophthalmic ointment Apply  to eye DIALYSIS PRN. 8/25/20   Provider, Historical       ROS - per HPI      Objective:     Patient-Reported Vitals 1/25/2021   Patient-Reported Weight 122   Patient-Reported Height 5 4   Patient-Reported Pulse 67   Patient-Reported Temperature 98.7   Patient-Reported Systolic  586   Patient-Reported Diastolic 82     General: alert, cooperative, no distress   Mental  status: normal behavior, speech, dress, motor activity, and thought processes, able to follow commands   Eyes: EOM intact, normal sclera   Resp: normal effort and no respiratory distress   Neuro: no gross deficits   Psychiatric: anxious             We discussed the expected course, resolution and complications of the diagnosis(es) in detail. Medication risks, benefits, costs, interactions, and alternatives were discussed as indicated. I advised her to contact the office if her condition worsens, changes or fails to improve as anticipated. She expressed understanding with the diagnosis(es) and plan. Ross Hill is a 66 y.o. female who was evaluated by a video visit encounter for concerns as above. Patient identification was verified prior to start of the visit. A caregiver was present when appropriate. Due to this being a TeleHealth encounter (During [facility] public health emergency), evaluation of the following organ systems was limited: Vitals/Constitutional/EENT/Resp/CV/GI//MS/Neuro/Skin/Heme-Lymph-Imm. Pursuant to the emergency declaration under the [facility] waiver authority and the [employer] General Act, this Virtual  Visit was conducted, with patient's (and/or legal guardian's) consent, to reduce the patient's risk of exposure to COVID-19 and provide necessary medical care. Services were provided through a synchronous discussion virtually to substitute for in-person clinic visit.  I was in the office. The patient was at home.     Myesha Woods MD

## 2021-01-29 ENCOUNTER — HOSPITAL ENCOUNTER (OUTPATIENT)
Age: 76
Setting detail: OUTPATIENT SURGERY
Discharge: HOME | End: 2021-01-29
Attending: INTERNAL MEDICINE | Admitting: INTERNAL MEDICINE
Payer: MEDICARE

## 2021-01-29 ENCOUNTER — ANESTHESIA EVENT (OUTPATIENT)
Dept: ENDOSCOPY | Age: 76
End: 2021-01-29
Payer: MEDICARE

## 2021-01-29 ENCOUNTER — ANESTHESIA (OUTPATIENT)
Dept: ENDOSCOPY | Age: 76
End: 2021-01-29
Payer: MEDICARE

## 2021-01-29 VITALS
BODY MASS INDEX: 20.83 KG/M2 | HEART RATE: 66 BPM | SYSTOLIC BLOOD PRESSURE: 116 MMHG | HEIGHT: 64 IN | WEIGHT: 122 LBS | RESPIRATION RATE: 20 BRPM | OXYGEN SATURATION: 100 % | TEMPERATURE: 96.8 F | DIASTOLIC BLOOD PRESSURE: 71 MMHG

## 2021-01-29 PROCEDURE — 74011000250 HC RX REV CODE- 250: Performed by: NURSE ANESTHETIST, CERTIFIED REGISTERED

## 2021-01-29 PROCEDURE — 74011250636 HC RX REV CODE- 250/636: Performed by: NURSE ANESTHETIST, CERTIFIED REGISTERED

## 2021-01-29 PROCEDURE — 88305 TISSUE EXAM BY PATHOLOGIST: CPT

## 2021-01-29 PROCEDURE — 76040000019: Performed by: INTERNAL MEDICINE

## 2021-01-29 PROCEDURE — 76060000031 HC ANESTHESIA FIRST 0.5 HR: Performed by: INTERNAL MEDICINE

## 2021-01-29 RX ORDER — SODIUM CHLORIDE 9 MG/ML
INJECTION, SOLUTION INTRAVENOUS
Status: DISCONTINUED | OUTPATIENT
Start: 2021-01-29 | End: 2021-01-29 | Stop reason: HOSPADM

## 2021-01-29 RX ORDER — PROPOFOL 10 MG/ML
INJECTION, EMULSION INTRAVENOUS AS NEEDED
Status: DISCONTINUED | OUTPATIENT
Start: 2021-01-29 | End: 2021-01-29 | Stop reason: HOSPADM

## 2021-01-29 RX ORDER — SODIUM CHLORIDE 0.9 % (FLUSH) 0.9 %
5-40 SYRINGE (ML) INJECTION AS NEEDED
Status: DISCONTINUED | OUTPATIENT
Start: 2021-01-29 | End: 2021-01-29 | Stop reason: HOSPADM

## 2021-01-29 RX ORDER — DEXTROMETHORPHAN/PSEUDOEPHED 2.5-7.5/.8
1.2 DROPS ORAL
Status: DISCONTINUED | OUTPATIENT
Start: 2021-01-29 | End: 2021-01-29 | Stop reason: HOSPADM

## 2021-01-29 RX ORDER — FENTANYL CITRATE 50 UG/ML
200 INJECTION, SOLUTION INTRAMUSCULAR; INTRAVENOUS
Status: DISCONTINUED | OUTPATIENT
Start: 2021-01-29 | End: 2021-01-29 | Stop reason: HOSPADM

## 2021-01-29 RX ORDER — LIDOCAINE HYDROCHLORIDE 20 MG/ML
INJECTION, SOLUTION EPIDURAL; INFILTRATION; INTRACAUDAL; PERINEURAL AS NEEDED
Status: DISCONTINUED | OUTPATIENT
Start: 2021-01-29 | End: 2021-01-29 | Stop reason: HOSPADM

## 2021-01-29 RX ORDER — ATROPINE SULFATE 0.1 MG/ML
0.5 INJECTION INTRAVENOUS
Status: DISCONTINUED | OUTPATIENT
Start: 2021-01-29 | End: 2021-01-29 | Stop reason: HOSPADM

## 2021-01-29 RX ORDER — SODIUM CHLORIDE 0.9 % (FLUSH) 0.9 %
5-40 SYRINGE (ML) INJECTION EVERY 8 HOURS
Status: DISCONTINUED | OUTPATIENT
Start: 2021-01-29 | End: 2021-01-29 | Stop reason: HOSPADM

## 2021-01-29 RX ORDER — EPINEPHRINE 0.1 MG/ML
1 INJECTION INTRACARDIAC; INTRAVENOUS
Status: DISCONTINUED | OUTPATIENT
Start: 2021-01-29 | End: 2021-01-29 | Stop reason: HOSPADM

## 2021-01-29 RX ORDER — SODIUM CHLORIDE 9 MG/ML
150 INJECTION, SOLUTION INTRAVENOUS CONTINUOUS
Status: DISCONTINUED | OUTPATIENT
Start: 2021-01-29 | End: 2021-01-29 | Stop reason: HOSPADM

## 2021-01-29 RX ORDER — MIDAZOLAM HYDROCHLORIDE 1 MG/ML
.25-5 INJECTION, SOLUTION INTRAMUSCULAR; INTRAVENOUS
Status: DISCONTINUED | OUTPATIENT
Start: 2021-01-29 | End: 2021-01-29 | Stop reason: HOSPADM

## 2021-01-29 RX ADMIN — PROPOFOL 30 MG: 10 INJECTION, EMULSION INTRAVENOUS at 08:28

## 2021-01-29 RX ADMIN — PROPOFOL 30 MG: 10 INJECTION, EMULSION INTRAVENOUS at 08:37

## 2021-01-29 RX ADMIN — SODIUM CHLORIDE: 900 INJECTION, SOLUTION INTRAVENOUS at 08:16

## 2021-01-29 RX ADMIN — PROPOFOL 30 MG: 10 INJECTION, EMULSION INTRAVENOUS at 08:34

## 2021-01-29 RX ADMIN — LIDOCAINE HYDROCHLORIDE 50 MG: 20 INJECTION, SOLUTION EPIDURAL; INFILTRATION; INTRACAUDAL; PERINEURAL at 08:20

## 2021-01-29 RX ADMIN — PROPOFOL 50 MG: 10 INJECTION, EMULSION INTRAVENOUS at 08:20

## 2021-01-29 RX ADMIN — PROPOFOL 30 MG: 10 INJECTION, EMULSION INTRAVENOUS at 08:24

## 2021-01-29 NOTE — ANESTHESIA POSTPROCEDURE EVALUATION
Post-Anesthesia Evaluation and Assessment    Patient: Hood Damian MRN: [id number]  SSN: [id number]    YOB: 1945  Age: 66 y.o. Sex: female       Cardiovascular Function/Vital Signs  Visit Vitals  /64   Pulse 71   Temp 36 °C (96.8 °F)   Resp 16   Ht 5' 4\" (1.626 m)   Wt 55.3 kg (122 lb)   SpO2 98%   Breastfeeding No   BMI 20.94 kg/m²       Patient is status post MAC anesthesia for Procedure(s):  COLONOSCOPY   :-  COLON BIOPSY. Nausea/Vomiting: None    Postoperative hydration reviewed and adequate. Pain:  Pain Scale 1: Numeric (0 - 10) (01/29/21 0850)  Pain Intensity 1: 0 (01/29/21 0850)   Managed    Neurological Status: At baseline    Mental Status and Level of Consciousness: Alert and oriented to person, place, and time    Pulmonary Status:   O2 Device: Room air (01/29/21 0850)   Adequate oxygenation and airway patent    Complications related to anesthesia: None    Post-anesthesia assessment completed. No concerns    Signed By: Sylvie Avendano MD     January 29, 2021              Procedure(s):  COLONOSCOPY   :-  COLON BIOPSY. MAC    <BSHSIANPOST>    INITIAL Post-op Vital signs:   Vitals Value Taken Time   /74 01/29/21 0900   Temp 36 °C (96.8 °F) 01/29/21 0846   Pulse 64 01/29/21 0900   Resp 11 01/29/21 0900   SpO2 99 % 01/29/21 0900   Vitals shown include unvalidated device data.

## 2021-01-29 NOTE — PERIOP NOTES

## 2021-01-29 NOTE — H&P
[address], [address] [place] [address]          Pre-procedure History and Physical       NAME:  Liv Call   :      MRN:   [id number]     CHIEF COMPLAINT/HPI: diarrhea    PMH:  Past Medical History:   Diagnosis Date    Allergic rhinitis, cause unspecified 2009    Anemia NEC     Benign neoplasm of colon     Hypercholesterolemia     Osteopenia 2009       PSH:  Past Surgical History:   Procedure Laterality Date    COLONOSCOPY N/A 2017    COLONOSCOPY performed by Kirstie Mathews MD at [facility]. [address], COLON, DIAGNOSTIC      due repeat  per note    HX BREAST BIOPSY Left     benign    HX BREAST BIOPSY Right     benign    HX BUNIONECTOMY      HX HYSTERECTOMY      partial    HX ORTHOPAEDIC      ACL - right knee    HX ORTHOPAEDIC  2014    right foot surgery    HX ORTHOPAEDIC  2015    left thumb surgery    HX TONSILLECTOMY      AR BREAST SURGERY PROCEDURE UNLISTED      left & rt. bx - benign    US GUIDED CORE BREAST BIOPSY Left     benign       Allergies: Allergies   Allergen Reactions    Lipitor [Atorvastatin] Myalgia    Oxycodone Other (comments)     Unable to walk, \"completely knocks me out\"    Sulfa (Sulfonamide Antibiotics) Hives    Zocor [Simvastatin] Myalgia       Home Medications:  Prior to Admission Medications   Prescriptions Last Dose Informant Patient Reported? Taking? CALCIUM CARBONATE (CALCIUM 300 PO) 2021 at Unknown time  Yes Yes   Sig: Take 300 mg by mouth daily. CHOLECALCIFEROL, VITAMIN D3, (VITAMIN D3 PO) 2021 at Unknown time  Yes Yes   Sig: Take 400 Units by mouth daily. PV W-O MARIEL/FERROUS FUMARATE/FA (M-VIT PO) 2021 at Unknown time  Yes Yes   Sig: Take  by mouth. Takes one multivitamin per day.    acamprosate (CAMPRAL) 333 mg tablet 2021 at Unknown time  No Yes   Sig: TAKE 2 TABLETS BY MOUTH 3 TIMES A DAY   aspirin delayed-release 81 mg tablet 2021 at Unknown time  Yes Yes   Sig: Take  by mouth daily. betamethasone valerate (LUXIQ) 0.12 % topical foam 1/22/2021 at Unknown time  Yes Yes   Sig: MARGO FOAM EXT TO THE SCALP BID FOR RASH   colestipoL (COLESTID) 1 gram tablet 1/28/2021 at Unknown time  Yes Yes   Sig: TAKE 2 TABLETS BY MOUTH TWICE A DAY. (TAKE 1 HOUR AFTER OR 4 HOURS BEFORE OTHER MEDS)   estradioL (VAGIFEM) 10 mcg tab vaginal tablet 1/28/2021 at Unknown time  No Yes   Sig: Insert 1 Tab into vagina every Monday and Thursday. ezetimibe (ZETIA) 10 mg tablet 1/28/2021 at Unknown time  No Yes   Sig: TK 1 T PO QD   fexofenadine-pseudoephedrine (Allegra-D 12 Hour)  mg Tb12 1/28/2021 at Unknown time  Yes Yes   Sig: Take 1 Tab by mouth every twelve (12) hours. fluticasone propionate (FLONASE NA) 1/28/2021 at Unknown time  Yes Yes   Sig: by Nasal route.   gabapentin (NEURONTIN) 100 mg capsule 1/28/2021 at Unknown time  Yes Yes   Sig: Take 100 mg by mouth daily. naltrexone (DEPADE) 50 mg tablet 1/28/2021 at Unknown time  No Yes   Sig: Take 1 Tab by mouth daily. neomycin-polymyxin-dexamethasone (DEXACINE) 3.5 mg/g-10,000 unit/g-0.1 % ophthalmic ointment Not Taking at Unknown time  Yes No   Sig: Apply  to eye DIALYSIS PRN. neomycin-polymyxin-dexamethasone (MAXITROL) ophthalmic suspension 1/28/2021 at Unknown time  Yes Yes   Sig: Administer 2 Drops to both eyes two (2) times a day. sertraline (ZOLOFT) 50 mg tablet 1/28/2021 at Unknown time  No Yes   Sig: TAKE 1 TABLET BY MOUTH EVERY DAY   traZODone (DESYREL) 50 mg tablet 1/28/2021 at Unknown time  No Yes   Sig: Take 1 Tab by mouth nightly.       Facility-Administered Medications: None       Hospital Medications:  Current Facility-Administered Medications   Medication Dose Route Frequency    0.9% sodium chloride infusion  150 mL/hr IntraVENous CONTINUOUS    sodium chloride (NS) flush 5-40 mL  5-40 mL IntraVENous Q8H    sodium chloride (NS) flush 5-40 mL  5-40 mL IntraVENous PRN    midazolam (VERSED) injection 0.25-5 mg  0.25-5 mg IntraVENous Multiple    fentaNYL citrate (PF) injection 200 mcg  200 mcg IntraVENous Multiple    simethicone (MYLICON) 41KV/0.5XP oral drops 80 mg  1.2 mL Oral Multiple    atropine injection 0.5 mg  0.5 mg IntraVENous ONCE PRN    EPINEPHrine (ADRENALIN) 0.1 mg/mL syringe 1 mg  1 mg Endoscopically ONCE PRN       Family History:  Family History   Problem Relation Age of Onset    Breast Cancer Mother 48    Heart Disease Father     Breast Cancer Maternal Grandmother     Other Brother         TIA    Coronary Artery Disease Paternal Uncle        Social History:  Social History     Tobacco Use    Smoking status: Former Smoker     Types: Cigarettes     Quit date: 1987     Years since quittin.1    Smokeless tobacco: Never Used    Tobacco comment: former cigarette smoker   Substance Use Topics    Alcohol use: Never     Frequency: Never     Binge frequency: Never       The patient was counseled at length about the risks of ike Covid-19 in the cherise-operative and post-operative states including the recovery window of their procedure. The patient was made aware that ike Covid-19 after a surgical procedure may worsen their prognosis for recovering from the virus and lend to a higher morbidity and or mortality risk. The patient was given the options of postponing their procedure. All of the risks, benefits, and alternatives were discussed. The patient does  wish to proceed with the procedure. PHYSICAL EXAM PRIOR TO SEDATION:  General: Alert, in no acute distress    Lungs:            CTA bilaterally  Heart:  Normal S1, S2    Abdomen: Soft, Non distended, Non tender. Normoactive bowel sounds. Assessment:   Stable for sedation administration.   Date of last colonoscopy: 3 yrs, Polyps  No    Plan:     · Endoscopic procedure with sedation     Signed By: Concepcion Richter MD     2021  8:18 AM

## 2021-01-29 NOTE — DISCHARGE INSTRUCTIONS
Patient Education   Patient Education        High-Fiber Diet: Care Instructions  Your Care Instructions     A high-fiber diet may help you relieve constipation and feel less bloated. Your doctor and dietitian will help you make a high-fiber eating plan based on your personal needs. The plan will include the things you like to eat. It will also make sure that you get 30 grams of fiber a day. Before you make changes to the way you eat, be sure to talk with your doctor or dietitian. Follow-up care is a key part of your treatment and safety. Be sure to make and go to all appointments, and call your doctor if you are having problems. It's also a good idea to know your test results and keep a list of the medicines you take. How can you care for yourself at home? · You can increase how much fiber you get if you eat more of certain foods. These foods include:  ? Whole-grain breads and cereals. ? Fruits, such as pears, apples, and peaches. Eat the skins, peels, and seeds, if you can.  ? Vegetables, such as broccoli, cabbage, spinach, carrots, asparagus, and squash. ? Starchy vegetables. These include potatoes with skins, kidney beans, and lima beans. · Take a fiber supplement every day if your doctor recommends it. Examples are Benefiber, Citrucel, FiberCon, and Metamucil. Ask your doctor how much to take. · Drink plenty of fluids, enough so that your urine is light yellow or clear like water. If you have kidney, heart, or liver disease and have to limit fluids, talk with your doctor before you increase the amount of fluids you drink. · Get some exercise every day. Exercise helps stool move through the colon. It also helps prevent constipation. · Keep a food diary. Try to notice and write down what foods cause gas, pain, or other symptoms. Then you can avoid these foods. Where can you learn more?   Go to http://www.gray.com/  Enter P469 in the search box to learn more about \"High-Fiber Diet: Care Instructions. \"  Current as of: August 22, 2019               Content Version: 12.6  © 5573-3761 [employer]. Care instructions adapted under license by [employer] (which disclaims liability or warranty for this information). If you have questions about a medical condition or this instruction, always ask your healthcare professional. [address] any warranty or liability for your use of this information. Diverticulosis: Care Instructions  Your Care Instructions  In diverticulosis, pouches called diverticula form in the wall of the large intestine (colon). The pouches do not cause any pain or other symptoms. Most people who have diverticulosis do not know they have it. But the pouches sometimes bleed, and if they become infected, they can cause pain and other symptoms. When this happens, it is called diverticulitis. Diverticula form when pressure pushes the wall of the colon outward at certain weak points. A diet that is too low in fiber can cause diverticula. Follow-up care is a key part of your treatment and safety. Be sure to make and go to all appointments, and call your doctor if you are having problems. It's also a good idea to know your test results and keep a list of the medicines you take. How can you care for yourself at home? · Include fruits, leafy green vegetables, beans, and whole grains in your diet each day. These foods are high in fiber. · Take a fiber supplement, such as Citrucel or Metamucil, every day if needed. Read and follow all instructions on the label. · Drink plenty of fluids, enough so that your urine is light yellow or clear like water. If you have kidney, heart, or liver disease and have to limit fluids, talk with your doctor before you increase the amount of fluids you drink. · Get at least 30 minutes of exercise on most days of the week. Walking is a good choice.  You also may want to do other activities, such as running, swimming, cycling, or playing tennis or team sports. · Cut out foods that cause gas, pain, or other symptoms. When should you call for help? Call your doctor now or seek immediate medical care if:    · You have belly pain.     · You pass maroon or very bloody stools.     · You have a fever.     · You have nausea and vomiting.     · You have unusual changes in your bowel movements or abdominal swelling.     · You have burning pain when you urinate.     · You have abnormal vaginal discharge.     · You have shoulder pain.     · You have cramping pain that does not get better when you have a bowel movement or pass gas.     · You pass gas or stool from your urethra while urinating. Watch closely for changes in your health, and be sure to contact your doctor if you have any problems. Where can you learn more? Go to http://www.gray.com/  Enter [id number] in the search box to learn more about \"Diverticulosis: Care Instructions. \"  Current as of: April 15, 2020               Content Version: 12.6  © 7119-7568 "[employer]". Care instructions adapted under license by [employer] (which disclaims liability or warranty for this information). If you have questions about a medical condition or this instruction, always ask your healthcare professional. [place] [address] any warranty or liability for your use of this information. [address]  Rene Elias M.D.  [address] [place] [address]  [phone]          COLONOSCOPY DISCHARGE INSTRUCTIONS    [facility]  [id number]  1945    DISCOMFORT:  Redness at IV site- apply warm compress to area; if redness or soreness persist- contact your physician  There may be a slight amount of blood passed from the rectum  Gaseous discomfort- walking, belching will help relieve any discomfort  You may not operate a vehicle for 12 hours  You may not engage in an occupation involving machinery or appliances for the  rest of today  You may not drink alcoholic beverages for at least 12 hours  Avoid making any critical decisions for at least 24 hours    DIET:   You may resume your normal diet, but some patients find that heavy or large  meals may lead to indigestion or vomiting. I suggest a light meal as first food  intake. I recommend a whole food, plant-based diet for your overall health. ACTIVITY:  You may resume your normal daily activities. It is recommended that you spend the remainder of the day resting - avoid any strenuous activity. CALL M.D. IF ANY SIGN OF:   Increasing pain, nausea, vomiting  Abdominal distension (swelling)  Significant bleeding (oral or rectal)  Fever   Pain in chest area  Shortness of breath    Additional Instructions:   Call Dr. Gini Ceja if any questions or problems at [phone]   You should receive the biopsy results by phone or mail within 3 weeks, if not, call  my office for the results      Should have a repeat colonoscopy in 5 years based on pathology. Colonoscopy showed polyp appearance at the appendiceal orifice, diverticulosis, and small internal hemorrhoids. Biopsies taken throughout. Learning About Coronavirus [phone])  Coronavirus [phone]): Overview  What is coronavirus (COVID-19)? The coronavirus disease (COVID-19) is caused by a virus. It is an illness that was first found in [place], [place], in December 2019. It has since spread worldwide. The virus can cause fever, cough, and trouble breathing. In severe cases, it can cause pneumonia and make it hard to breathe without help. It can cause death. Coronaviruses are a large group of viruses. They cause the common cold. They also cause more serious illnesses like Middle East respiratory syndrome (MERS) and severe acute respiratory syndrome (SARS). COVID-19 is caused by a novel coronavirus. That means it's a new type that has not been seen in people before.   This virus spreads person-to-person through droplets from coughing and sneezing. It can also spread when you are close to someone who is infected. And it can spread when you touch something that has the virus on it, such as a doorknob or a tabletop. What can you do to protect yourself from coronavirus (COVID-19)? The best way to protect yourself from getting sick is to:  · Avoid areas where there is an outbreak. · Avoid contact with people who may be infected. · Wash your hands often with soap or alcohol-based hand sanitizers. · Avoid crowds and try to stay at least 6 feet away from other people. · Wash your hands often, especially after you cough or sneeze. Use soap and water, and scrub for at least 20 seconds. If soap and water aren't available, use an alcohol-based hand . · Avoid touching your mouth, nose, and eyes. What can you do to avoid spreading the virus to others? To help avoid spreading the virus to others:  · Cover your mouth with a tissue when you cough or sneeze. Then throw the tissue in the trash. · Use a disinfectant to clean things that you touch often. · Stay home if you are sick or have been exposed to the virus. Don't go to school, work, or public areas. And don't use public transportation. · If you are sick:  ? Leave your home only if you need to get medical care. But call the doctor's office first so they know you're coming. And wear a face mask, if you have one.  ? If you have a face mask, wear it whenever you're around other people. It can help stop the spread of the virus when you cough or sneeze. ? Clean and disinfect your home every day. Use household  and disinfectant wipes or sprays. Take special care to clean things that you grab with your hands. These include doorknobs, remote controls, phones, and handles on your refrigerator and microwave. And don't forget countertops, tabletops, bathrooms, and computer keyboards.   When to call for help  Call 911 anytime you think you may need emergency care. For example, call if:  · You have severe trouble breathing. (You can't talk at all.)  · You have constant chest pain or pressure. · You are severely dizzy or lightheaded. · You are confused or can't think clearly. · Your face and lips have a blue color. · You pass out (lose consciousness) or are very hard to wake up. Call your doctor now if you develop symptoms such as:  · Shortness of breath. · Fever. · Cough. If you need to get care, call ahead to the doctor's office for instructions before you go. Make sure you wear a face mask, if you have one, to prevent exposing other people to the virus. Where can you get the latest information? The following health organizations are tracking and studying this virus. Their websites contain the most up-to-date information. Nadeenzaria Rainey also learn what to do if you think you may have been exposed to the virus. · U.S. Centers for Disease Control and Prevention (CDC): The CDC provides updated news about the disease and travel advice. The website also tells you how to prevent the spread of infection. www.cdc.gov  · World Health Organization [facility]): WHO offers information about the virus outbreaks. WHO also has travel advice. www.who.int  Current as of: April 1, 2020               Content Version: 12.4  © 9640-8142 [employer]. Care instructions adapted under license by your healthcare professional. If you have questions about a medical condition or this instruction, always ask your healthcare professional. [address] any warranty or liability for your use of this information.

## 2021-01-29 NOTE — ANESTHESIA PREPROCEDURE EVALUATION
Anesthetic History   No history of anesthetic complications            Review of Systems / Medical History  Patient summary reviewed, nursing notes reviewed and pertinent labs reviewed    Pulmonary  Within defined limits                 Neuro/Psych   Within defined limits           Cardiovascular  Within defined limits                Exercise tolerance: >4 METS     GI/Hepatic/Renal  Within defined limits              Endo/Other  Within defined limits      Anemia     Other Findings              Physical Exam    Airway  Mallampati: II  TM Distance: > 6 cm  Neck ROM: normal range of motion   Mouth opening: Normal     Cardiovascular  Regular rate and rhythm,  S1 and S2 normal,  no murmur, click, rub, or gallop             Dental  No notable dental hx       Pulmonary  Breath sounds clear to auscultation               Abdominal  GI exam deferred       Other Findings            Anesthetic Plan    ASA: 2  Anesthesia type: MAC          Induction: Intravenous  Anesthetic plan and risks discussed with: Patient

## 2021-01-29 NOTE — PROCEDURES
[facility] 9[facility] Michelle Eason M.D.  Tr[address]ary, [address] [facility]  [phone]               Colonoscopy Procedure Note    NAME: Julito Correa  :    MRN:  [id number]    Indications:   Diarrhea     : Maggie Johnson MD    Referring Provider:  Scottie Bhagat MD    Staff: Endoscopy Technician-1: Baldev Madison  Endoscopy RN-1: Verito Sterling RN    Prosthetic devices, grafts, tissues, transplant, or devices implanted: none    Medicines:  MAC anesthesia      Procedure Details:  After informed consent was obtained with all risks and benefits of the procedure explained and preprocedure exam completed, the patient was placed in the left lateral decubitus position. Universal protocol for patient identification was performed and documented in the nursing notes. Throughout the procedure, the patient's blood pressure was monitored at least every five minutes; pulse, and oxygen saturations were monitored continuously. All vital signs were documented in the nursing notes. A digital rectal exam was performed and was normal.  The Olympus videocolonoscope  was inserted in the rectum and carefully advanced to the terminal ileum. The colonoscope was slowly withdrawn with careful evaluation between folds. Retroflexion in the rectum was performed; findings and interventions are described below. Procedure start time, extent reached time/cecum time, and procedure end time are documented in the nursing notes. The quality of preparation was adequate. Findings:   1. Polypoid appearance at the appendiceal orifice s/p biopsies  2. Moderate sigmoid diverticulosis  3. Small internal hemorrhoids  4.  Rest of the colon and TI were normal s/p random biopsies of the colon to evaluate for microscopic colitis    Interventions:    biopsy of colon colon    Specimens:   ID Type Source Tests Collected by Time Destination   1 : Appendiceal orifice biopsy Preservative Appendiceal Nabila Rutherford MD 1/29/2021 2094 Pathology   2 : random colon biopsies Preservative Random colon  Jonnie Kim MD 1/29/2021 6532 Pathology       EBL:  None. Complications:   No immediate complications     Impression:  -See post-procedure diagnoses. Recommendations:   -Repeat colonoscopy in 5 years based on appendiceal orifice biopsy with pediatric colonoscope  If < 10 years, reason:  above average risk patient    Resume normal medication(s). Signed by:  Ashley Kwon MD          1/29/2021  8:43 AM

## 2021-02-01 RX ORDER — ACAMPROSATE CALCIUM 333 MG/1
TABLET, DELAYED RELEASE ORAL
Qty: 180 TAB | Refills: 1 | Status: SHIPPED | OUTPATIENT
Start: 2021-02-01 | End: 2021-02-25

## 2021-02-04 ENCOUNTER — DOCUMENTATION ONLY (OUTPATIENT)
Dept: SURGERY | Age: 76
End: 2021-02-04

## 2021-02-04 NOTE — PROGRESS NOTES
Received office notes from Josafat. Forwarded to Dr. Solitario Dao for review. Patient has an appointment to see Dr. Solitario Dao on 2/18/21.

## 2021-02-17 ENCOUNTER — TRANSCRIBE ORDER (OUTPATIENT)
Dept: SCHEDULING | Age: 76
End: 2021-02-17

## 2021-02-17 DIAGNOSIS — Z12.31 ENCOUNTER FOR MAMMOGRAM TO ESTABLISH BASELINE MAMMOGRAM: Primary | ICD-10-CM

## 2021-02-18 ENCOUNTER — TELEPHONE (OUTPATIENT)
Dept: SURGERY | Age: 76
End: 2021-02-18

## 2021-02-18 NOTE — TELEPHONE ENCOUNTER
Called pt to see if she can be in the office at 11AM due to the weather and the office possibly closing early. No answer - LVM to return call.

## 2021-02-23 ENCOUNTER — OFFICE VISIT (OUTPATIENT)
Dept: SURGERY | Age: 76
End: 2021-02-23
Payer: MEDICARE

## 2021-02-23 VITALS
TEMPERATURE: 99.2 F | WEIGHT: 125.2 LBS | DIASTOLIC BLOOD PRESSURE: 66 MMHG | RESPIRATION RATE: 18 BRPM | OXYGEN SATURATION: 92 % | HEART RATE: 84 BPM | BODY MASS INDEX: 21.37 KG/M2 | SYSTOLIC BLOOD PRESSURE: 105 MMHG | HEIGHT: 64 IN

## 2021-02-23 DIAGNOSIS — D12.0 ADENOMATOUS POLYP OF CECUM: Primary | ICD-10-CM

## 2021-02-23 PROCEDURE — G8536 NO DOC ELDER MAL SCRN: HCPCS | Performed by: SURGERY

## 2021-02-23 PROCEDURE — G9717 DOC PT DX DEP/BP F/U NT REQ: HCPCS | Performed by: SURGERY

## 2021-02-23 PROCEDURE — 3288F FALL RISK ASSESSMENT DOCD: CPT | Performed by: SURGERY

## 2021-02-23 PROCEDURE — G8399 PT W/DXA RESULTS DOCUMENT: HCPCS | Performed by: SURGERY

## 2021-02-23 PROCEDURE — 1090F PRES/ABSN URINE INCON ASSESS: CPT | Performed by: SURGERY

## 2021-02-23 PROCEDURE — 1100F PTFALLS ASSESS-DOCD GE2>/YR: CPT | Performed by: SURGERY

## 2021-02-23 PROCEDURE — 99203 OFFICE O/P NEW LOW 30 MIN: CPT | Performed by: SURGERY

## 2021-02-23 PROCEDURE — G8427 DOCREV CUR MEDS BY ELIG CLIN: HCPCS | Performed by: SURGERY

## 2021-02-23 PROCEDURE — G8420 CALC BMI NORM PARAMETERS: HCPCS | Performed by: SURGERY

## 2021-02-23 NOTE — PROGRESS NOTES
1. Have you been to the ER, urgent care clinic since your last visit? Hospitalized since your last visit? No     2. Have you seen or consulted any other health care providers outside of the [address] [place] [address] since your last visit? Include any pap smears or colon screening.  Yes Ortho @ [facility]

## 2021-02-23 NOTE — LETTER
2/24/2021 Patient: Dimple Habermann YOB: 1945 Date of Visit: 2/23/2021 Rahul Walsh MD 
[address] [place] [address] [address] [facility] [phone] [id number] [address] [employer] Dear Rahul Walsh MD, Thank you for referring Ms. Christy Gama to [facility] for evaluation. My notes for this consultation are attached. If you have questions, please do not hesitate to call me. I look forward to following your patient along with you.  
 
 
Sincerely, 
 
Jade Duarte MD

## 2021-02-24 NOTE — PROGRESS NOTES
[facility] History and Physical    Chief Complaint: Adenomatous polyp of appendiceal orifice    History of Present Illness:      Liv Call is a 66 y.o. female who was kindly referred by Dr. Hua Shah for evaluation of a adenomatous, serrated polyp at the appendiceal orifice. The patient underwent a colonoscopy for work-up of chronic diarrhea. She states that she has had watery diarrhea issues for the past 3 to 4 years. She is in the process of undergoing an extensive work-up for this with GI. On her colonoscopy the appendiceal orifice appeared prominent and this was biopsied. Pathology was consistent with an adenomatous polyp. The patient has had multiple previous colonoscopies and has a history of prior polyps that have all been removed endoscopically. She denies any blood in her stool. She denies weight loss or abdominal pain.   She has not ever required abdominal surgery in the past.    Past Medical History:   Diagnosis Date    Allergic rhinitis, cause unspecified 12/17/2009    Anemia NEC     Benign neoplasm of colon     Depression     Diarrhea     Hypercholesterolemia     Osteopenia 12/17/2009    Osteopenia        Past Surgical History:   Procedure Laterality Date    COLONOSCOPY N/A 11/30/2017    COLONOSCOPY performed by Kirstie Mathews MD at [facility]. N/A 1/29/2021    COLONOSCOPY   :- performed by Agustin Long MD at [facility]. [address] 77, COLON, DIAGNOSTIC  7/06    due repeat 2011 per note    HX BREAST BIOPSY Left 1990's    benign    HX BREAST BIOPSY Right 1980's    benign    HX BUNIONECTOMY      HX HYSTERECTOMY      partial    HX ORTHOPAEDIC      ACL - right knee    HX ORTHOPAEDIC  2014    right foot surgery    HX ORTHOPAEDIC  2015    left thumb surgery    HX TONSILLECTOMY      SC BREAST SURGERY PROCEDURE UNLISTED      left & rt. bx - benign    US GUIDED CORE BREAST BIOPSY Left 1990's    benign       Social History Socioeconomic History    Marital status:      Spouse name: Not on file    Number of children: Not on file    Years of education: Not on file    Highest education level: Not on file   Occupational History    Not on file   Social Needs    Financial resource strain: Not on file    Food insecurity     Worry: Not on file     Inability: Not on file    Transportation needs     Medical: Not on file     Non-medical: Not on file   Tobacco Use    Smoking status: Former Smoker     Types: Cigarettes     Quit date: 1987     Years since quittin.1    Smokeless tobacco: Never Used    Tobacco comment: former cigarette smoker   Substance and Sexual Activity    Alcohol use: Never     Frequency: Never     Binge frequency: Never    Drug use: No    Sexual activity: Yes     Partners: Male     Birth control/protection: None   Lifestyle    Physical activity     Days per week: Not on file     Minutes per session: Not on file    Stress: Not on file   Relationships    Social connections     Talks on phone: Not on file     Gets together: Not on file     Attends Anglican service: Not on file     Active member of club or organization: Not on file     Attends meetings of clubs or organizations: Not on file     Relationship status: Not on file    Intimate partner violence     Fear of current or ex partner: Not on file     Emotionally abused: Not on file     Physically abused: Not on file     Forced sexual activity: Not on file   Other Topics Concern    Not on file   Social History Narrative    Not on file       Family History   Problem Relation Age of Onset    Breast Cancer Mother 48    Heart Disease Father     Breast Cancer Maternal Grandmother     Other Brother         TIA    Coronary Artery Disease Paternal Uncle          Current Outpatient Medications:     traZODone (DESYREL) 50 mg tablet, Take 1 Tab by mouth nightly., Disp: 30 Tab, Rfl: 0    acamprosate (CAMPRAL) 333 mg tablet, TAKE 2 TABLETS BY MOUTH 3 TIMES A DAY, Disp: 180 Tab, Rfl: 1    neomycin-polymyxin-dexamethasone (MAXITROL) ophthalmic suspension, Administer 2 Drops to both eyes two (2) times a day., Disp: , Rfl:     colestipoL (COLESTID) 1 gram tablet, TAKE 2 TABLETS BY MOUTH TWICE A DAY. (TAKE 1 HOUR AFTER OR 4 HOURS BEFORE OTHER MEDS), Disp: , Rfl:     sertraline (ZOLOFT) 50 mg tablet, TAKE 1 TABLET BY MOUTH EVERY DAY, Disp: 90 Tab, Rfl: 1    estradioL (VAGIFEM) 10 mcg tab vaginal tablet, Insert 1 Tab into vagina every Monday and Thursday. , Disp: 24 Tab, Rfl: 3    naltrexone (DEPADE) 50 mg tablet, Take 1 Tab by mouth daily. , Disp: 90 Tab, Rfl: 1    gabapentin (NEURONTIN) 100 mg capsule, Take 100 mg by mouth daily. , Disp: , Rfl:     aspirin delayed-release 81 mg tablet, Take  by mouth daily. , Disp: , Rfl:     fexofenadine-pseudoephedrine (Allegra-D 12 Hour)  mg Tb12, Take 1 Tab by mouth every twelve (12) hours. , Disp: , Rfl:     ezetimibe (ZETIA) 10 mg tablet, TK 1 T PO QD, Disp: 90 Tab, Rfl: 3    fluticasone propionate (FLONASE NA), by Nasal route., Disp: , Rfl:     betamethasone valerate (LUXIQ) 0.12 % topical foam, AMRGO FOAM EXT TO THE SCALP BID FOR RASH, Disp: , Rfl: 1    CHOLECALCIFEROL, VITAMIN D3, (VITAMIN D3 PO), Take 400 Units by mouth daily. , Disp: , Rfl:     PV W-O MARIEL/FERROUS FUMARATE/FA (M-VIT PO), Take  by mouth. Takes one multivitamin per day., Disp: , Rfl:     CALCIUM CARBONATE (CALCIUM 300 PO), Take 300 mg by mouth daily. , Disp: , Rfl:     neomycin-polymyxin-dexamethasone (DEXACINE) 3.5 mg/g-10,000 unit/g-0.1 % ophthalmic ointment, Apply  to eye DIALYSIS PRN., Disp: , Rfl:     Allergies   Allergen Reactions    Lipitor [Atorvastatin] Myalgia    Oxycodone Other (comments)     Unable to walk, \"completely knocks me out\"    Sulfa (Sulfonamide Antibiotics) Hives    Zocor [Simvastatin] Myalgia       ROS   Constitutional: Negative  Ears, Nose, Mouth, Throat, and Face: Negative  Respiratory: Negative  Cardiovascular: Negative  Gastrointestinal: Chronic diarrhea  Genitourinary: Negative  Integument/Breast: Negative  Hematologic/Lymphatic: Negative  Behavioral/Psychiatric: Negative  Allergic/Immunologic: Negative      Physical Exam:     Visit Vitals  /66 (BP 1 Location: Left upper arm, BP Patient Position: Sitting, BP Cuff Size: Adult)   Pulse 84   Temp 99.2 °F (37.3 °C) (Oral)   Resp 18   Ht 5' 4\" (1.626 m)   Wt 125 lb 3.2 oz (56.8 kg)   SpO2 92%   BMI 21.49 kg/m²       General - alert and oriented, no apparent distress  HEENT - NC/AT. No scleral icterus  Pulm - CTAB, normal inspiratory effort  CV - RRR, no M/R/G  Abd - soft, NT, ND. No palpable masses or organomegaly. Ext - warm, well perfused, no edema  Lymphatics - no cervical, supraclavicular or inguinal adenopathy noted. Skin - supple, no rashes  Psychiatric - normal affect, good mood    Labs  None    Imaging  None      Assessment:     Julito Correa is a 66 y.o. female with a sessile, adenomatous polyp at the appendiceal orifice. She has a history of chronic diarrhea. Recommendations:     1. In reviewing the colonoscopy images, this appears to be fairly limited at the appendiceal orifice. I recommended that we proceed with a laparoscopic appendectomy, taking a small portion of the cecum to see if this can be removed in this manner. I am concerned that a ileocecectomy could worsen her diarrhea issues. We did discuss that if pathology shows residual adenomatous polyp at the margin that she may require a segmental resection and anastomosis. She is in agreement with this plan. I will set her up for a laparoscopic appendectomy in the near future. She will continue her ongoing work-up for diarrhea with the GI team.      30 mins of time was spent with the patient of which > 50% of the time involved face-to-face counseling of the patient regarding the proposed treatment plan. Ihsan Candelaria MD  2/23/2021    CC:  Scottie Bhagat, MD Dr. Ev Gardiner

## 2021-02-25 ENCOUNTER — HOSPITAL ENCOUNTER (OUTPATIENT)
Dept: GENERAL RADIOLOGY | Age: 76
Discharge: HOME | End: 2021-02-25
Attending: SURGERY
Payer: MEDICARE

## 2021-02-25 ENCOUNTER — HOSPITAL ENCOUNTER (OUTPATIENT)
Dept: PREADMISSION TESTING | Age: 76
Discharge: HOME | End: 2021-02-25
Payer: MEDICARE

## 2021-02-25 ENCOUNTER — TRANSCRIBE ORDER (OUTPATIENT)
Dept: REGISTRATION | Age: 76
End: 2021-02-25

## 2021-02-25 VITALS
SYSTOLIC BLOOD PRESSURE: 124 MMHG | BODY MASS INDEX: 21.08 KG/M2 | DIASTOLIC BLOOD PRESSURE: 66 MMHG | WEIGHT: 123.46 LBS | TEMPERATURE: 98.1 F | HEART RATE: 61 BPM | HEIGHT: 64 IN

## 2021-02-25 DIAGNOSIS — Z01.812 PRE-PROCEDURE LAB EXAM: ICD-10-CM

## 2021-02-25 DIAGNOSIS — Z01.812 PRE-PROCEDURE LAB EXAM: Primary | ICD-10-CM

## 2021-02-25 LAB
ANION GAP SERPL CALC-SCNC: 2 MMOL/L (ref 5–15)
APTT PPP: 25.9 SEC (ref 22.1–31)
ATRIAL RATE: 63 BPM
BUN SERPL-MCNC: 14 MG/DL (ref 6–20)
BUN/CREAT SERPL: 17 (ref 12–20)
CALCIUM SERPL-MCNC: 8.8 MG/DL (ref 8.5–10.1)
CALCULATED P AXIS, ECG09: 29 DEGREES
CALCULATED R AXIS, ECG10: 30 DEGREES
CALCULATED T AXIS, ECG11: 27 DEGREES
CHLORIDE SERPL-SCNC: 108 MMOL/L (ref 97–108)
CO2 SERPL-SCNC: 31 MMOL/L (ref 21–32)
CREAT SERPL-MCNC: 0.84 MG/DL (ref 0.55–1.02)
DIAGNOSIS, 93000: NORMAL
ERYTHROCYTE [DISTWIDTH] IN BLOOD BY AUTOMATED COUNT: 13.2 % (ref 11.5–14.5)
GLUCOSE SERPL-MCNC: 87 MG/DL (ref 65–100)
HCT VFR BLD AUTO: 34.6 % (ref 35–47)
HGB BLD-MCNC: 11.6 G/DL (ref 11.5–16)
INR PPP: 0.9 (ref 0.9–1.1)
MCH RBC QN AUTO: 31.5 PG (ref 26–34)
MCHC RBC AUTO-ENTMCNC: 33.5 G/DL (ref 30–36.5)
MCV RBC AUTO: 94 FL (ref 80–99)
NRBC # BLD: 0 K/UL (ref 0–0.01)
NRBC BLD-RTO: 0 PER 100 WBC
P-R INTERVAL, ECG05: 176 MS
PLATELET # BLD AUTO: 236 K/UL (ref 150–400)
PMV BLD AUTO: 9.3 FL (ref 8.9–12.9)
POTASSIUM SERPL-SCNC: 4.3 MMOL/L (ref 3.5–5.1)
PROTHROMBIN TIME: 9.9 SEC (ref 9–11.1)
Q-T INTERVAL, ECG07: 412 MS
QRS DURATION, ECG06: 90 MS
QTC CALCULATION (BEZET), ECG08: 421 MS
RBC # BLD AUTO: 3.68 M/UL (ref 3.8–5.2)
SODIUM SERPL-SCNC: 141 MMOL/L (ref 136–145)
THERAPEUTIC RANGE,PTTT: NORMAL SECS (ref 58–77)
VENTRICULAR RATE, ECG03: 63 BPM
WBC # BLD AUTO: 3 K/UL (ref 3.6–11)

## 2021-02-25 PROCEDURE — U0003 INFECTIOUS AGENT DETECTION BY NUCLEIC ACID (DNA OR RNA); SEVERE ACUTE RESPIRATORY SYNDROME CORONAVIRUS 2 (SARS-COV-2) (CORONAVIRUS DISEASE [COVID-19]), AMPLIFIED PROBE TECHNIQUE, MAKING USE OF HIGH THROUGHPUT TECHNOLOGIES AS DESCRIBED BY CMS-2020-01-R: HCPCS

## 2021-02-25 PROCEDURE — 71046 X-RAY EXAM CHEST 2 VIEWS: CPT

## 2021-02-25 PROCEDURE — 36415 COLL VENOUS BLD VENIPUNCTURE: CPT

## 2021-02-25 PROCEDURE — 85610 PROTHROMBIN TIME: CPT

## 2021-02-25 PROCEDURE — 93005 ELECTROCARDIOGRAM TRACING: CPT

## 2021-02-25 PROCEDURE — 85730 THROMBOPLASTIN TIME PARTIAL: CPT

## 2021-02-25 PROCEDURE — 80048 BASIC METABOLIC PNL TOTAL CA: CPT

## 2021-02-25 PROCEDURE — 85027 COMPLETE CBC AUTOMATED: CPT

## 2021-02-25 RX ORDER — ACAMPROSATE CALCIUM 333 MG/1
TABLET, DELAYED RELEASE ORAL
Qty: 180 TAB | Refills: 1 | Status: SHIPPED | OUTPATIENT
Start: 2021-02-25 | End: 2022-08-04

## 2021-02-25 NOTE — PERIOP NOTES
PREOPERATIVE INSTRUCTIONS REVIEWED WITH PATIENT. PATIENT GIVEN TWO-- BOTTLES OF CHG SOAPS  INSTRUCTIONS REVIEWED ON USE OF CHG SOAPS   PATIENT GIVEN SSI INFECTIONS SHEET. PATIENT WAS GIVEN THE OPPORTUNITY TO ASK QUESTIONS ON THE INFORMATION PROVIDED. PT  MADE AWARE OF COVID 19 TESTING NEEDED TO BE DONE WITHIN 96 HOURS OF SURGERY. PT INSTRUCTED ON SELF QUARANTINE  BETWEEN TESTING AND ARRIVAL TIME  ON DAY OF SURGERY. INSTRUCTION PROVIDED FOR CELL PHONE WAITING AREA, PT INFORMATION AND INSTRUCTIONS FOR APPOINTMENTS AT [facility] ON DAY OF SURGERY.

## 2021-02-26 NOTE — PERIOP NOTES
ABNORMAL WBC=3.0 EMAILED TO [facility], NURSE TO DR. BHAT.      5777: CALLED DR. RAMOS'S OFFICE, CARDIOLOGIST FOR LOV NOTES. SPOKE WITH SANTHOSH WHO STATED SHE WOULD FAX TO [facility]. FAX NUMBER PROVIDED. 8346: RECEIVED CARDIOLOGY NOTES FOR ABOVE.

## 2021-02-27 ENCOUNTER — ANESTHESIA EVENT (OUTPATIENT)
Dept: SURGERY | Age: 76
End: 2021-02-27
Payer: MEDICARE

## 2021-02-27 LAB — SARS-COV-2, COV2NT: NOT DETECTED

## 2021-03-01 ENCOUNTER — ANESTHESIA (OUTPATIENT)
Dept: SURGERY | Age: 76
End: 2021-03-01
Payer: MEDICARE

## 2021-03-01 ENCOUNTER — HOSPITAL ENCOUNTER (OUTPATIENT)
Age: 76
Setting detail: OUTPATIENT SURGERY
Discharge: HOME | End: 2021-03-01
Attending: SURGERY | Admitting: SURGERY
Payer: MEDICARE

## 2021-03-01 ENCOUNTER — TELEPHONE (OUTPATIENT)
Dept: INTERNAL MEDICINE CLINIC | Age: 76
End: 2021-03-01

## 2021-03-01 VITALS
BODY MASS INDEX: 21.08 KG/M2 | RESPIRATION RATE: 15 BRPM | SYSTOLIC BLOOD PRESSURE: 101 MMHG | HEART RATE: 73 BPM | OXYGEN SATURATION: 100 % | TEMPERATURE: 96.9 F | DIASTOLIC BLOOD PRESSURE: 62 MMHG | HEIGHT: 64 IN | WEIGHT: 123.46 LBS

## 2021-03-01 DIAGNOSIS — D12.0 ADENOMATOUS POLYP OF CECUM: Primary | ICD-10-CM

## 2021-03-01 PROCEDURE — 77030008608 HC TRCR ENDOSC SMTH AMR -B: Performed by: SURGERY

## 2021-03-01 PROCEDURE — 44970 LAPAROSCOPY APPENDECTOMY: CPT | Performed by: PHYSICIAN ASSISTANT

## 2021-03-01 PROCEDURE — 77030026438 HC STYL ET INTUB CARD -A: Performed by: NURSE ANESTHETIST, CERTIFIED REGISTERED

## 2021-03-01 PROCEDURE — 77030040922 HC BLNKT HYPOTHRM STRY -A

## 2021-03-01 PROCEDURE — 77030038157 HC DEV PWR CNTR DISP SIGNIA COVD -C: Performed by: SURGERY

## 2021-03-01 PROCEDURE — 74011000250 HC RX REV CODE- 250: Performed by: SURGERY

## 2021-03-01 PROCEDURE — 77030008684 HC TU ET CUF COVD -B: Performed by: NURSE ANESTHETIST, CERTIFIED REGISTERED

## 2021-03-01 PROCEDURE — 88304 TISSUE EXAM BY PATHOLOGIST: CPT

## 2021-03-01 PROCEDURE — 74011000250 HC RX REV CODE- 250: Performed by: NURSE ANESTHETIST, CERTIFIED REGISTERED

## 2021-03-01 PROCEDURE — 76010000138 HC OR TIME 0.5 TO 1 HR: Performed by: SURGERY

## 2021-03-01 PROCEDURE — 74011000258 HC RX REV CODE- 258: Performed by: SURGERY

## 2021-03-01 PROCEDURE — 76210000016 HC OR PH I REC 1 TO 1.5 HR: Performed by: SURGERY

## 2021-03-01 PROCEDURE — 77030034628 HC LIGASURE LAP SEAL DIV MD COVD -F: Performed by: SURGERY

## 2021-03-01 PROCEDURE — 74011250636 HC RX REV CODE- 250/636: Performed by: NURSE ANESTHETIST, CERTIFIED REGISTERED

## 2021-03-01 PROCEDURE — 77030040361 HC SLV COMPR DVT MDII -B: Performed by: SURGERY

## 2021-03-01 PROCEDURE — 74011250637 HC RX REV CODE- 250/637: Performed by: ANESTHESIOLOGY

## 2021-03-01 PROCEDURE — 44970 LAPAROSCOPY APPENDECTOMY: CPT | Performed by: SURGERY

## 2021-03-01 PROCEDURE — 77030031139 HC SUT VCRL2 J&J -A: Performed by: SURGERY

## 2021-03-01 PROCEDURE — 76210000020 HC REC RM PH II FIRST 0.5 HR: Performed by: SURGERY

## 2021-03-01 PROCEDURE — 77030003578 HC NDL INSUF VERES AMR -B: Performed by: SURGERY

## 2021-03-01 PROCEDURE — 77030010507 HC ADH SKN DERMBND J&J -B: Performed by: SURGERY

## 2021-03-01 PROCEDURE — 77030009965 HC RELD STPLR ENDOS COVD -D: Performed by: SURGERY

## 2021-03-01 PROCEDURE — 77030002933 HC SUT MCRYL J&J -A: Performed by: SURGERY

## 2021-03-01 PROCEDURE — 76060000033 HC ANESTHESIA 1 TO 1.5 HR: Performed by: SURGERY

## 2021-03-01 PROCEDURE — 77030009851 HC PCH RTVR ENDOSC AMR -B: Performed by: SURGERY

## 2021-03-01 PROCEDURE — 77030012770 HC TRCR OPT FX AMR -B: Performed by: SURGERY

## 2021-03-01 PROCEDURE — 2709999900 HC NON-CHARGEABLE SUPPLY: Performed by: SURGERY

## 2021-03-01 PROCEDURE — 74011250636 HC RX REV CODE- 250/636: Performed by: ANESTHESIOLOGY

## 2021-03-01 RX ORDER — SODIUM CHLORIDE 0.9 % (FLUSH) 0.9 %
5-40 SYRINGE (ML) INJECTION AS NEEDED
Status: DISCONTINUED | OUTPATIENT
Start: 2021-03-01 | End: 2021-03-01 | Stop reason: HOSPADM

## 2021-03-01 RX ORDER — ROCURONIUM BROMIDE 10 MG/ML
INJECTION, SOLUTION INTRAVENOUS AS NEEDED
Status: DISCONTINUED | OUTPATIENT
Start: 2021-03-01 | End: 2021-03-01 | Stop reason: HOSPADM

## 2021-03-01 RX ORDER — MIDAZOLAM HYDROCHLORIDE 1 MG/ML
1 INJECTION, SOLUTION INTRAMUSCULAR; INTRAVENOUS AS NEEDED
Status: DISCONTINUED | OUTPATIENT
Start: 2021-03-01 | End: 2021-03-01 | Stop reason: HOSPADM

## 2021-03-01 RX ORDER — FENTANYL CITRATE 50 UG/ML
INJECTION, SOLUTION INTRAMUSCULAR; INTRAVENOUS AS NEEDED
Status: DISCONTINUED | OUTPATIENT
Start: 2021-03-01 | End: 2021-03-01 | Stop reason: HOSPADM

## 2021-03-01 RX ORDER — KETAMINE HYDROCHLORIDE 10 MG/ML
INJECTION, SOLUTION INTRAMUSCULAR; INTRAVENOUS AS NEEDED
Status: DISCONTINUED | OUTPATIENT
Start: 2021-03-01 | End: 2021-03-01 | Stop reason: HOSPADM

## 2021-03-01 RX ORDER — ONDANSETRON 2 MG/ML
4 INJECTION INTRAMUSCULAR; INTRAVENOUS AS NEEDED
Status: DISCONTINUED | OUTPATIENT
Start: 2021-03-01 | End: 2021-03-01 | Stop reason: HOSPADM

## 2021-03-01 RX ORDER — MIDAZOLAM HYDROCHLORIDE 1 MG/ML
1 INJECTION, SOLUTION INTRAMUSCULAR; INTRAVENOUS
Status: DISCONTINUED | OUTPATIENT
Start: 2021-03-01 | End: 2021-03-01 | Stop reason: HOSPADM

## 2021-03-01 RX ORDER — FENTANYL CITRATE 50 UG/ML
50 INJECTION, SOLUTION INTRAMUSCULAR; INTRAVENOUS AS NEEDED
Status: DISCONTINUED | OUTPATIENT
Start: 2021-03-01 | End: 2021-03-01 | Stop reason: HOSPADM

## 2021-03-01 RX ORDER — SODIUM CHLORIDE 0.9 % (FLUSH) 0.9 %
5-40 SYRINGE (ML) INJECTION EVERY 8 HOURS
Status: DISCONTINUED | OUTPATIENT
Start: 2021-03-01 | End: 2021-03-01 | Stop reason: HOSPADM

## 2021-03-01 RX ORDER — SODIUM CHLORIDE, SODIUM LACTATE, POTASSIUM CHLORIDE, CALCIUM CHLORIDE 600; 310; 30; 20 MG/100ML; MG/100ML; MG/100ML; MG/100ML
125 INJECTION, SOLUTION INTRAVENOUS CONTINUOUS
Status: DISCONTINUED | OUTPATIENT
Start: 2021-03-01 | End: 2021-03-01 | Stop reason: HOSPADM

## 2021-03-01 RX ORDER — LIDOCAINE HYDROCHLORIDE 10 MG/ML
0.5 INJECTION, SOLUTION EPIDURAL; INFILTRATION; INTRACAUDAL; PERINEURAL AS NEEDED
Status: DISCONTINUED | OUTPATIENT
Start: 2021-03-01 | End: 2021-03-01 | Stop reason: HOSPADM

## 2021-03-01 RX ORDER — ONDANSETRON 2 MG/ML
INJECTION INTRAMUSCULAR; INTRAVENOUS AS NEEDED
Status: DISCONTINUED | OUTPATIENT
Start: 2021-03-01 | End: 2021-03-01 | Stop reason: HOSPADM

## 2021-03-01 RX ORDER — DOCUSATE SODIUM 100 MG/1
100 CAPSULE, LIQUID FILLED ORAL 2 TIMES DAILY
Qty: 20 CAP | Refills: 0 | Status: SHIPPED | OUTPATIENT
Start: 2021-03-01 | End: 2021-03-11

## 2021-03-01 RX ORDER — MIDAZOLAM HYDROCHLORIDE 1 MG/ML
INJECTION, SOLUTION INTRAMUSCULAR; INTRAVENOUS AS NEEDED
Status: DISCONTINUED | OUTPATIENT
Start: 2021-03-01 | End: 2021-03-01 | Stop reason: HOSPADM

## 2021-03-01 RX ORDER — DEXAMETHASONE SODIUM PHOSPHATE 4 MG/ML
INJECTION, SOLUTION INTRA-ARTICULAR; INTRALESIONAL; INTRAMUSCULAR; INTRAVENOUS; SOFT TISSUE AS NEEDED
Status: DISCONTINUED | OUTPATIENT
Start: 2021-03-01 | End: 2021-03-01 | Stop reason: HOSPADM

## 2021-03-01 RX ORDER — SUCCINYLCHOLINE CHLORIDE 20 MG/ML
INJECTION INTRAMUSCULAR; INTRAVENOUS AS NEEDED
Status: DISCONTINUED | OUTPATIENT
Start: 2021-03-01 | End: 2021-03-01 | Stop reason: HOSPADM

## 2021-03-01 RX ORDER — BUPIVACAINE HYDROCHLORIDE AND EPINEPHRINE 2.5; 5 MG/ML; UG/ML
INJECTION, SOLUTION EPIDURAL; INFILTRATION; INTRACAUDAL; PERINEURAL AS NEEDED
Status: DISCONTINUED | OUTPATIENT
Start: 2021-03-01 | End: 2021-03-01 | Stop reason: HOSPADM

## 2021-03-01 RX ORDER — TRAMADOL HYDROCHLORIDE 50 MG/1
50 TABLET ORAL
Qty: 20 TAB | Refills: 0 | Status: SHIPPED | OUTPATIENT
Start: 2021-03-01 | End: 2021-03-04 | Stop reason: ALTCHOICE

## 2021-03-01 RX ORDER — LIDOCAINE HYDROCHLORIDE 20 MG/ML
INJECTION, SOLUTION EPIDURAL; INFILTRATION; INTRACAUDAL; PERINEURAL AS NEEDED
Status: DISCONTINUED | OUTPATIENT
Start: 2021-03-01 | End: 2021-03-01 | Stop reason: HOSPADM

## 2021-03-01 RX ORDER — PHENYLEPHRINE HCL IN 0.9% NACL 0.4MG/10ML
SYRINGE (ML) INTRAVENOUS AS NEEDED
Status: DISCONTINUED | OUTPATIENT
Start: 2021-03-01 | End: 2021-03-01 | Stop reason: HOSPADM

## 2021-03-01 RX ORDER — ACETAMINOPHEN 325 MG/1
650 TABLET ORAL ONCE
Status: COMPLETED | OUTPATIENT
Start: 2021-03-01 | End: 2021-03-01

## 2021-03-01 RX ORDER — DEXTROSE, SODIUM CHLORIDE, SODIUM LACTATE, POTASSIUM CHLORIDE, AND CALCIUM CHLORIDE 5; .6; .31; .03; .02 G/100ML; G/100ML; G/100ML; G/100ML; G/100ML
125 INJECTION, SOLUTION INTRAVENOUS CONTINUOUS
Status: DISCONTINUED | OUTPATIENT
Start: 2021-03-01 | End: 2021-03-01 | Stop reason: HOSPADM

## 2021-03-01 RX ORDER — DIPHENHYDRAMINE HYDROCHLORIDE 50 MG/ML
12.5 INJECTION, SOLUTION INTRAMUSCULAR; INTRAVENOUS AS NEEDED
Status: DISCONTINUED | OUTPATIENT
Start: 2021-03-01 | End: 2021-03-01 | Stop reason: HOSPADM

## 2021-03-01 RX ORDER — NEOSTIGMINE METHYLSULFATE 1 MG/ML
INJECTION INTRAVENOUS AS NEEDED
Status: DISCONTINUED | OUTPATIENT
Start: 2021-03-01 | End: 2021-03-01 | Stop reason: HOSPADM

## 2021-03-01 RX ORDER — FENTANYL CITRATE 50 UG/ML
25 INJECTION, SOLUTION INTRAMUSCULAR; INTRAVENOUS
Status: DISCONTINUED | OUTPATIENT
Start: 2021-03-01 | End: 2021-03-01 | Stop reason: HOSPADM

## 2021-03-01 RX ORDER — GLYCOPYRROLATE 0.2 MG/ML
INJECTION INTRAMUSCULAR; INTRAVENOUS AS NEEDED
Status: DISCONTINUED | OUTPATIENT
Start: 2021-03-01 | End: 2021-03-01 | Stop reason: HOSPADM

## 2021-03-01 RX ORDER — PROPOFOL 10 MG/ML
INJECTION, EMULSION INTRAVENOUS AS NEEDED
Status: DISCONTINUED | OUTPATIENT
Start: 2021-03-01 | End: 2021-03-01 | Stop reason: HOSPADM

## 2021-03-01 RX ADMIN — ONDANSETRON HYDROCHLORIDE 4 MG: 2 INJECTION, SOLUTION INTRAMUSCULAR; INTRAVENOUS at 07:56

## 2021-03-01 RX ADMIN — KETAMINE HYDROCHLORIDE 10 MG: 10 INJECTION, SOLUTION INTRAMUSCULAR; INTRAVENOUS at 07:38

## 2021-03-01 RX ADMIN — NEOSTIGMINE METHYLSULFATE 3 MG: 1 INJECTION, SOLUTION INTRAVENOUS at 08:20

## 2021-03-01 RX ADMIN — MIDAZOLAM 0.5 MG: 1 INJECTION INTRAMUSCULAR; INTRAVENOUS at 07:29

## 2021-03-01 RX ADMIN — GLYCOPYRROLATE 0.4 MG: 0.2 INJECTION, SOLUTION INTRAMUSCULAR; INTRAVENOUS at 08:20

## 2021-03-01 RX ADMIN — ROCURONIUM BROMIDE 25 MG: 10 SOLUTION INTRAVENOUS at 07:41

## 2021-03-01 RX ADMIN — DEXAMETHASONE SODIUM PHOSPHATE 4 MG: 4 INJECTION, SOLUTION INTRAMUSCULAR; INTRAVENOUS at 07:56

## 2021-03-01 RX ADMIN — SODIUM CHLORIDE, POTASSIUM CHLORIDE, SODIUM LACTATE AND CALCIUM CHLORIDE 125 ML/HR: 600; 310; 30; 20 INJECTION, SOLUTION INTRAVENOUS at 08:58

## 2021-03-01 RX ADMIN — ONDANSETRON 4 MG: 2 INJECTION INTRAMUSCULAR; INTRAVENOUS at 08:45

## 2021-03-01 RX ADMIN — SODIUM CHLORIDE, POTASSIUM CHLORIDE, SODIUM LACTATE AND CALCIUM CHLORIDE 125 ML/HR: 600; 310; 30; 20 INJECTION, SOLUTION INTRAVENOUS at 06:43

## 2021-03-01 RX ADMIN — ROCURONIUM BROMIDE 5 MG: 10 SOLUTION INTRAVENOUS at 07:38

## 2021-03-01 RX ADMIN — CEFOTETAN DISODIUM 2 G: 2 INJECTION, POWDER, FOR SOLUTION INTRAMUSCULAR; INTRAVENOUS at 07:52

## 2021-03-01 RX ADMIN — FENTANYL CITRATE 25 MCG: 50 INJECTION, SOLUTION INTRAMUSCULAR; INTRAVENOUS at 09:33

## 2021-03-01 RX ADMIN — LIDOCAINE HYDROCHLORIDE 60 MG: 20 INJECTION, SOLUTION EPIDURAL; INFILTRATION; INTRACAUDAL; PERINEURAL at 07:38

## 2021-03-01 RX ADMIN — Medication 40 MCG: at 07:38

## 2021-03-01 RX ADMIN — MIDAZOLAM 0.5 MG: 1 INJECTION INTRAMUSCULAR; INTRAVENOUS at 07:33

## 2021-03-01 RX ADMIN — PROPOFOL 100 MG: 10 INJECTION, EMULSION INTRAVENOUS at 07:38

## 2021-03-01 RX ADMIN — ACETAMINOPHEN 650 MG: 325 TABLET ORAL at 06:43

## 2021-03-01 RX ADMIN — FENTANYL CITRATE 25 MCG: 50 INJECTION, SOLUTION INTRAMUSCULAR; INTRAVENOUS at 09:23

## 2021-03-01 RX ADMIN — MIDAZOLAM 1 MG: 1 INJECTION INTRAMUSCULAR; INTRAVENOUS at 07:26

## 2021-03-01 RX ADMIN — SUCCINYLCHOLINE CHLORIDE 100 MG: 20 INJECTION, SOLUTION INTRAMUSCULAR; INTRAVENOUS at 07:38

## 2021-03-01 RX ADMIN — FENTANYL CITRATE 50 MCG: 50 INJECTION, SOLUTION INTRAMUSCULAR; INTRAVENOUS at 07:38

## 2021-03-01 NOTE — H&P
Date of Surgery Update:  Liv Call was seen and examined. History and physical has been reviewed. The patient has been examined. There have been no significant clinical changes since the completion of the originally dated History and Physical.  Patient with adenomatous polyp at appendiceal orifice not amenable to endoscopic removal.  Plan for laparoscopic appendectomy with portion of cecum. A complete discussion of the risks, benefits and alternatives to surgery were discussed with the patient who was keen to proceed. The patient was counseled at length about the risks of ike Covid-19 during their perioperative period and any recovery window from their procedure. The patient was made aware that ike Covid-19  may worsen their prognosis for recovering from their procedure and lend to a higher morbidity and/or mortality risk. All material risks, benefits, and reasonable alternatives including postponing the procedure were discussed. The patient does  wish to proceed with the procedure at this time.         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 N/A 1/29/2021    COLONOSCOPY   :- performed by Agustin Long MD at [facility]. [address] 77, COLON, DIAGNOSTIC  7/06    due repeat 2011 per note  HX ADENOIDECTOMY  1947    HX BREAST BIOPSY Left 1990's    benign    HX BREAST BIOPSY Right 1980's    benign    HX BUNIONECTOMY      HX GI      COLONOSCOPY    HX HYSTERECTOMY  1994    partial    HX KNEE ARTHROSCOPY Right 11/1992    ACL - right knee    HX ORTHOPAEDIC Right 2014    BUNIONECTOMY    HX ORTHOPAEDIC  2015    left thumb surgery    HX OTHER SURGICAL  2020    BCCA REMOVED FROM NECK     HX POLYPECTOMY      HX TONSILLECTOMY  1947    HX UROLOGICAL      BLADDER TUCK    NM BREAST SURGERY PROCEDURE UNLISTED      left & rt. bx - benign    US GUIDED CORE BREAST BIOPSY Left 1990's    benign        Social History  The patient Tricia Hardy  reports that she quit smoking about 34 years ago. Her smoking use included cigarettes. She has a 30.00 pack-year smoking history. She has never used smokeless tobacco. She reports previous alcohol use. She reports that she does not use drugs.      Family History  Family History   Problem Relation Age of Onset    Breast Cancer Mother 48    Heart Disease Father     Breast Cancer Maternal Grandmother     Other Brother         TIA    Coronary Artery Disease Paternal Uncle     Anesth Problems Neg Hx           Suni Lovell MD

## 2021-03-01 NOTE — OP NOTES
OPERATIVE NOTE    Date of Procedure: 3/1/2021     Preoperative Diagnosis: POLYP OF APPENDICEAL ORIFICE    Postoperative Diagnosis: POLYP OF APPENDICEAL ORIFICE      Procedure:  LAPAROSCOPIC APPENDECTOMY WITH PORTION OF CECUM    Surgeon: Mercedse Leonardo MD     Assistant(s): ANA ROSA Cuevas     Anesthesia: General     Estimated Blood Loss: 2 mL    Specimens:   ID Type Source Tests Collected by Time Destination   1 : Appendix Fresh Appendix  Viktor Lopez MD 3/1/2021 0809 Pathology      Findings: Normal appearing appendix. No signs of malignancy. No other abnormal findings noted. Indications: The patient had fullness at the appendiceal orifice that was biopsied on colonoscopy and showed findings consistent with an adenomatous polyp on pathology. This was not felt to be amenable to endoscopic removal so she was referred for surgical resection. Given the limited nature of this polyp by endoscopic exam, this was felt to be amenable to removal with an appendectomy and small portion of cecum. We discussed that if residual polyp remains at the margin, that she may need an ileocecectomy to fully remove the polyp. She preferred to start with appendectomy initially. A complete discussion of the risks, benefits and alternatives to surgery were discussed with the patient who was keen to proceed. Description of Operation: Aliyah Mancera was identified in the pre-operative holding area. Informed consent was obtained after a complete discussion of risks benefits and alternatives to surgery were had with the patient. The patient was brought back to the OR and placed under general endotracheal anesthesia in the supine position on the operating room table with the arms extended and a footboard in place. The patient was then prepped and draped in the usual sterile fashion. A proper timeout was performed. We then injected local anesthetic into the cherise-umbilical skin and subcutaneous tissue.   A 5 mm infraumbilical incision was then made using an 11 blade. We dissected down to the abdominal wall fascia at the base of the umbilicus with a Kocher clamp. This was grasped and retracted anteriorly. A Veress needle was then passed into the abdomen and a standard water-drop technique test was performed. The abdomen was insufflated to 15 mm Hg. The Veress was then removed and a 5 mm Optiview trocar was placed with the scope inserted. The abdomen was entered safely. Additional trocars including a 12 mm left lower quadrant trocar and a 5 mm suprapubic trocar were placed in usual locations for laparoscopic appendectomy. These were all placed after injection of local anesthetic and under direct visualization. The patient was placed in Trendelenburg position with the right side up. The right lower quadrant of the abdomen was examined. No abnormal findings were noted. The cecum and terminal ileum were clearly identified as well as the appendix. The appendix was dissected free of the surrounding tissues in a delicate manner. The appendix was grasped and elevated anteriorly and the mesoappendix was taken down using a ligasure device. The appendix was divided including a 1 cm portion of cecum taking care not to narrow the ileocecal junction. Good hemostasis was maintained. The appendix was then passed into an endocatch bag and removed through the LLQ 12 mm trocar site. The 12 mm LLQ trocar site was then closed using an 0-vicryl suture on a suture passer to re-approximate the fascia. The abdomen was then allowed to desufflate and all remaining trocars were removed. Additional local anesthetic was injected at all incision sites. The skin was then re-approximated using 4-0 monocryl subcuticular stitches and dermabond skin adhesive. All needle and instrument counts were correct at the completion of the case. I was present and scrubbed throughout the entirety of the case.   There were no immediate complications.       Complications: None    Implants: * No implants in log *    Daylin Rushing MD  3/1/2021

## 2021-03-01 NOTE — ANESTHESIA POSTPROCEDURE EVALUATION
Procedure(s):  LAPAROSCOPIC APPENDECTOMY WITH PORTION OF CECUM. general    Anesthesia Post Evaluation        Patient location during evaluation: PACU  Patient participation: complete - patient participated  Level of consciousness: awake and alert  Pain management: adequate  Airway patency: patent  Anesthetic complications: no  Cardiovascular status: acceptable  Respiratory status: acceptable  Hydration status: acceptable  Comments: I have seen and evaluated the patient and is ready for discharge. Fahad George MD    Post anesthesia nausea and vomiting:  none      INITIAL Post-op Vital signs:   Vitals Value Taken Time   /62 03/01/21 0945   Temp 36.1 °C (96.9 °F) 03/01/21 0937   Pulse 74 03/01/21 0946   Resp 16 03/01/21 0946   SpO2 100 % 03/01/21 0946   Vitals shown include unvalidated device data.

## 2021-03-01 NOTE — ANESTHESIA PREPROCEDURE EVALUATION
Anesthetic History   No history of anesthetic complications            Review of Systems / Medical History  Patient summary reviewed, nursing notes reviewed and pertinent labs reviewed    Pulmonary  Within defined limits                 Neuro/Psych   Within defined limits           Cardiovascular  Within defined limits                Exercise tolerance: >4 METS     GI/Hepatic/Renal  Within defined limits              Endo/Other  Within defined limits      Anemia     Other Findings              Physical Exam    Airway  Mallampati: II  TM Distance: > 6 cm  Neck ROM: normal range of motion   Mouth opening: Normal     Cardiovascular  Regular rate and rhythm,  S1 and S2 normal,  no murmur, click, rub, or gallop             Dental  No notable dental hx       Pulmonary  Breath sounds clear to auscultation               Abdominal  GI exam deferred       Other Findings            Anesthetic Plan    ASA: 2  Anesthesia type: general          Induction: Intravenous  Anesthetic plan and risks discussed with: Patient

## 2021-03-01 NOTE — PERIOP NOTES
Patient interviewed in holding area, identity and procedure verified. Patient stated that she does not want any calls to family at start of case, they can call afterwards.

## 2021-03-01 NOTE — ROUTINE PROCESS
Patient: Mari Rodrigez MRN: [id number]  SSN: [id number]   YOB: 1945  Age: 66 y.o. Sex: female     Patient is status post Procedure(s):  LAPAROSCOPIC APPENDECTOMY WITH PORTION OF CECUM. Surgeon(s) and Role:     * Marly Suggs MD - Primary    Local/Dose/Irrigation:  30 ml local given, see MAR                Peripheral IV 03/01/21 Left;Posterior Forearm (Active)   Site Assessment Clean, dry, & intact 03/01/21 0642   Phlebitis Assessment 0 03/01/21 0642   Infiltration Assessment 0 03/01/21 0642   Dressing Status Clean, dry, & intact 03/01/21 0642   Dressing Type Transparent;Tape 03/01/21 0799   Hub Color/Line Status Green; Infusing 03/01/21 2648            Airway - Endotracheal Tube 03/01/21 Oral (Active)                   Dressing/Packing:  Incision 03/01/21 Abdomen-Dressing/Treatment: Surgical glue (03/01/21 0837)    Splint/Cast:  ]    Other:

## 2021-03-01 NOTE — DISCHARGE INSTRUCTIONS
Patient Discharge Instructions    Renee Limon / [id number] : 1945    Admitted 3/1/2021 Discharged: 3/1/2021       LAPAROSCOPIC APPENDECTOMY      FOLLOW-UP:  Please make an appointment with your physician in 10 - 14 day(s). Call your physician immediately if you have any fevers greater than 101.5, drainage from your wound that is not clear or looks infected, persistent bleeding, increasing abdominal pain, problems urinating, or persistent nausea/vomiting. You should be aware that you may have shoulder pain after surgery and that this will progressively go away. This is called 'referred pain' and is from the area of the diaphragm caused by gas that may be trapped under the diaphragm from laparoscopic surgery. This gas will progressively get reabsorbed by your body. WOUND CARE INSTRUCTIONS:   You may shower at home. If clothing rubs against the wound or causes irritation and the wound is not draining you may cover it with a dry dressing during the daytime. Try to keep the wound dry and avoid ointments on the wound unless directed to do so. If the wound becomes bright red and painful or starts to drain infected material that is not clear, please contact your physician immediately. You should also call if you begin to drain fluid that is thin and greenish-brown from the wound and appears to look like bile. If the wound though is mildly pink and has a thick firm ridge underneath it, this is normal, and is referred to as a healing ridge. This will resolve over the next 4-6 weeks. DIET:  You may eat any foods that you can tolerate. It is a good idea to eat a high fiber diet and take in plenty of fluids to prevent constipation. If you do become constipated you may want to take a mild laxative or take ducolax tablets on a daily basis until your bowel habits are regular. Constipation can be very uncomfortable, along with straining, after recent abdominal surgery.     ACTIVITY:  You are encouraged to cough and deep breath or use your incentive spirometer if you were given one, every 15-30 minutes when awake. This will help prevent respiratory complications and low grade fevers post-operatively. You may want to hug a pillow when coughing and sneezing to add additional support to the surgical area(s) which will decrease pain during these times. You are encouraged to walk and engage in light activity for the next two weeks. You should not lift more than 20 pounds during this time frame as it could put you at increased risk for a post-operative hernia. Twenty pounds is roughly equivalent to a plastic bag of groceries. · Most people are able to return to work within 1 to 2 weeks after surgery. · You may shower 24 hours after surgery. Pat the cut (incision) dry. Do not take a bath for the first week. · Your doctor will tell you when you can have sex again. MEDICATIONS:  Try to take narcotic medications and anti-inflammatory medications, such as tylenol, ibuprofen, naprosyn, etc., with food. This will minimize stomach upset from the medication. Should you develop nausea and vomiting from the pain medication, or develop a rash, please discontinue the medication and contact your physician. You should not drive, make important decisions, or operate machinery when taking narcotic pain medication. · It is important that you take the medication exactly as they are prescribed. · Keep your medication in the bottles provided by the pharmacist and keep a list of the medication names, dosages, and times to be taken in your wallet. · Do not take other medications without consulting your doctor. · Take ibuprofen (Motrin) as scheduled then combine with tramadol (Ultram) as needed for severe pain. QUESTIONS:  Please feel free to call Dr. Yusuf [facility] office ([phone]) if you have any questions, and they will be glad to assist you. Follow-up with Dr. Rosa Garcia in 2 week(s).  Call the office to schedule your appointment. Information obtained by :    I understand that if any problems occur once I am at home I am to contact my physician. I understand and acknowledge receipt of the instructions indicated above. Physician's or R.N.'s Signature                                                                  Date/Time                                                                                                                                              Patient or Representative Signature                                                          Date/Time               DISCHARGE SUMMARY from Nurse    PATIENT INSTRUCTIONS:    After general anesthesia or intravenous sedation, for 24 hours or while taking prescription Narcotics:  · Limit your activities  · Do not drive and operate hazardous machinery  · Do not make important personal or business decisions  · Do  not drink alcoholic beverages  · If you have not urinated within 8 hours after discharge, please contact your surgeon on call.     Report the following to your surgeon:  · Excessive pain, swelling, redness or odor of or around the surgical area  · Temperature over 100.5  · Nausea and vomiting lasting longer than 4 hours or if unable to take medications  · Any signs of decreased circulation or nerve impairment to extremity: change in color, persistent  numbness, tingling, coldness or increase pain  · Any questions

## 2021-03-04 ENCOUNTER — VIRTUAL VISIT (OUTPATIENT)
Dept: INTERNAL MEDICINE CLINIC | Age: 76
End: 2021-03-04
Payer: MEDICARE

## 2021-03-04 DIAGNOSIS — F41.9 ANXIETY: Primary | ICD-10-CM

## 2021-03-04 DIAGNOSIS — F51.04 PSYCHOPHYSIOLOGICAL INSOMNIA: ICD-10-CM

## 2021-03-04 DIAGNOSIS — E78.00 HYPERCHOLESTEROLEMIA: ICD-10-CM

## 2021-03-04 DIAGNOSIS — G50.0 TRIGEMINAL NEURALGIA: ICD-10-CM

## 2021-03-04 DIAGNOSIS — F32.A DEPRESSION, CONTROLLED: ICD-10-CM

## 2021-03-04 DIAGNOSIS — F10.11 H/O ETOH ABUSE: ICD-10-CM

## 2021-03-04 PROCEDURE — 1100F PTFALLS ASSESS-DOCD GE2>/YR: CPT | Performed by: INTERNAL MEDICINE

## 2021-03-04 PROCEDURE — 1090F PRES/ABSN URINE INCON ASSESS: CPT | Performed by: INTERNAL MEDICINE

## 2021-03-04 PROCEDURE — 3288F FALL RISK ASSESSMENT DOCD: CPT | Performed by: INTERNAL MEDICINE

## 2021-03-04 PROCEDURE — 99214 OFFICE O/P EST MOD 30 MIN: CPT | Performed by: INTERNAL MEDICINE

## 2021-03-04 PROCEDURE — G8420 CALC BMI NORM PARAMETERS: HCPCS | Performed by: INTERNAL MEDICINE

## 2021-03-04 PROCEDURE — G8399 PT W/DXA RESULTS DOCUMENT: HCPCS | Performed by: INTERNAL MEDICINE

## 2021-03-04 PROCEDURE — G8427 DOCREV CUR MEDS BY ELIG CLIN: HCPCS | Performed by: INTERNAL MEDICINE

## 2021-03-04 PROCEDURE — G9717 DOC PT DX DEP/BP F/U NT REQ: HCPCS | Performed by: INTERNAL MEDICINE

## 2021-03-04 PROCEDURE — G8536 NO DOC ELDER MAL SCRN: HCPCS | Performed by: INTERNAL MEDICINE

## 2021-03-04 RX ORDER — SERTRALINE HYDROCHLORIDE 50 MG/1
50 TABLET, FILM COATED ORAL EVERY EVENING
Qty: 90 TAB | Refills: 1 | Status: SHIPPED | OUTPATIENT
Start: 2021-03-04 | End: 2022-06-29

## 2021-03-04 RX ORDER — DICLOFENAC SODIUM 10 MG/G
GEL TOPICAL
COMMUNITY
Start: 2021-02-11

## 2021-03-04 RX ORDER — TRAZODONE HYDROCHLORIDE 50 MG/1
75 TABLET ORAL
Qty: 135 TAB | Refills: 1 | Status: SHIPPED | OUTPATIENT
Start: 2021-03-04 | End: 2022-06-29

## 2021-03-04 RX ORDER — EZETIMIBE 10 MG/1
TABLET ORAL
Qty: 90 TAB | Refills: 3 | Status: SHIPPED | OUTPATIENT
Start: 2021-03-04

## 2021-03-04 RX ORDER — NALTREXONE HYDROCHLORIDE 50 MG/1
50 TABLET, FILM COATED ORAL DAILY
Qty: 90 TAB | Refills: 1 | Status: SHIPPED | OUTPATIENT
Start: 2021-03-04 | End: 2022-08-04

## 2021-03-04 NOTE — PROGRESS NOTES
Aliyah Mancera is a 66 y.o. female who was seen by synchronous (real-time) audio-video technology on 3/4/2021. Assessment & Plan:   Diagnoses and all orders for this visit:    1. Anxiety  Continue with counseling and current medications. 2. Depression, controlled  Stable continue current medication. She will reach out to me if any acute worsening  after she receives her sentencing.  -     sertraline (ZOLOFT) 50 mg tablet; Take 1 Tab by mouth every evening. 3. Psychophysiological insomnia  Not as well-controlled due to current stressors and will increase to trazodone 75 mg nightly. She will let me know if symptoms do not improve. 4. Trigeminal neuralgia  Currently managed on gabapentin 100 mg nightly. We will continue that medication for now with goal to discuss tapering off at her next visit in 2 months. 5. Hypercholesterolemia  Continue current medication  6. H/O ETOH abuse  No alcohol. Other orders  -     traZODone (DESYREL) 50 mg tablet; Take 1.5 Tabs by mouth nightly. -     ezetimibe (ZETIA) 10 mg tablet; TK 1 T PO QD  -     naltrexone (DEPADE) 50 mg tablet; Take 1 Tab by mouth daily. Subjective:   Aliyah Mancera was seen for follow-up of anxiety, depression, trigeminal neuralgia, and hypercholesterolemia. She has her upcoming court sentencing 3/22/21. She does expect to be given the present time. She has had increased problems with sleeping recently with all of the stressors related to this. Anxiety has been a little higher but denies feeling depressed or suicidal thoughts. Tolerating Zoloft with no side effects. She is taking trazodone also for sleep. She has a history of alcohol abuse and is not drinking. She remains on naltrexone and Campral as recommended by her alcohol treatment program.  She was recently hospitalized for laparoscopic appendectomy with portion of cecum and is recovering from that well.   She has taken her last tramadol and does not plan to take anymore. She has a history of trigimenal neuralgia and has been treated with gabapentin by neurosurgery. She wishes to change care of this problem to me. Reports has not had any facial pain for the past 6 months. Gabapentin 100 mg nightly. She does not want to try to taper off of medication when she has the current stressors going on. Taking cholesterol medications with no side effects. Prior to Admission medications    Medication Sig Start Date End Date Taking? Authorizing Provider   diclofenac (VOLTAREN) 1 % gel APPLY 2 GRAMS 4 TIMES DAILY FOR 30 DAYS 2/11/21  Yes Provider, Historical   traMADoL (ULTRAM) 50 mg tablet Take 1 Tab by mouth every six (6) hours as needed for Pain for up to 7 days. Max Daily Amount: 200 mg. 3/1/21 3/8/21 Yes Marine Rodríguez MD   docusate sodium (COLACE) 100 mg capsule Take 1 Cap by mouth two (2) times a day for 10 days. 3/1/21 3/11/21 Yes Marine Rodríguez MD   acamprosate (CAMPRAL) 333 mg tablet TAKE 2 TABLETS BY MOUTH 3 TIMES A DAY 2/25/21  Yes Dale DYSON NP   traZODone (DESYREL) 50 mg tablet Take 1 Tab by mouth nightly. 2/20/21  Yes Scottie Bhagat MD   neomycin-polymyxin-dexamethasone (MAXITROL) ophthalmic suspension Administer 2 Drops to both eyes two (2) times a day. Yes Provider, Historical   colestipoL (COLESTID) 1 gram tablet TAKE 2 TABLETS BY MOUTH TWICE A DAY. (TAKE 1 HOUR AFTER OR 4 HOURS BEFORE OTHER MEDS) 11/30/20  Yes Provider, Historical   sertraline (ZOLOFT) 50 mg tablet TAKE 1 TABLET BY MOUTH EVERY DAY  Patient taking differently: nightly. TAKE 1 TABLET BY MOUTH EVERY DAY 12/17/20  Yes Scottie Bhagat MD   estradioL (VAGIFEM) 10 mcg tab vaginal tablet Insert 1 Tab into vagina every Monday and Thursday. 10/15/20  Yes Scottie Bhagat MD   naltrexone (DEPADE) 50 mg tablet Take 1 Tab by mouth daily. 9/12/20  Yes Scottie Bhagat MD   gabapentin (NEURONTIN) 100 mg capsule Take 100 mg by mouth nightly.  8/28/20  Yes Provider, Historical   aspirin delayed-release 81 mg tablet Take  by mouth daily. Yes Provider, Historical   fexofenadine-pseudoephedrine (Allegra-D 12 Hour)  mg Tb12 Take 1 Tab by mouth every twelve (12) hours. Yes Provider, Historical   ezetimibe (ZETIA) 10 mg tablet TK 1 T PO QD 12/5/19  Yes Jono Galan MD   fluticasone propionate (FLONASE NA) by Nasal route. Yes Provider, Historical   betamethasone valerate (LUXIQ) 0.12 % topical foam MARGO FOAM EXT TO THE SCALP BID FOR RASH 1/15/19  Yes Provider, Historical   CHOLECALCIFEROL, VITAMIN D3, (VITAMIN D3 PO) Take 400 Units by mouth daily. Yes Provider, Historical   PV W-O MARIEL/FERROUS FUMARATE/FA (M-VIT PO) Take  by mouth. Takes one multivitamin per day. Yes Provider, Historical   CALCIUM CARBONATE (CALCIUM 300 PO) Take 300 mg by mouth daily. Yes Provider, Historical       Review of Systems   Constitutional: Negative for fever. Respiratory: Negative for shortness of breath. Cardiovascular: Negative for chest pain. Gastrointestinal: Negative for abdominal pain. - per HPI      Objective:     Patient-Reported Vitals 3/4/2021   Patient-Reported Weight 122   Patient-Reported Height 5 4   Patient-Reported Pulse -   Patient-Reported Temperature 98.6   Patient-Reported Systolic  633   Patient-Reported Diastolic 75     General: alert, cooperative, no distress   Mental  status: normal mood, behavior, speech, dress, motor activity, and thought processes, able to follow commands   Eyes: EOM intact, normal sclera   Neck: no visualized mass   Resp: normal effort and no respiratory distress   Neuro: no gross deficits   Skin: no discoloration or lesions of concern on visible areas   Psychiatric: normal affect       Traci Champagne, who was evaluated through a synchronous (real-time) audio-video encounter, and/or her healthcare decision maker, is aware that it is a billable service, with coverage as determined by her insurance carrier.  She provided verbal consent to proceed: Yes, and patient identification was verified. It was conducted pursuant to the emergency declaration under the [facility] [address] and the [employer] Act. A caregiver was present when appropriate. Ability to conduct physical exam was limited. I was in the office. The patient was at home.      Anderson Vo MD

## 2021-03-11 ENCOUNTER — VIRTUAL VISIT (OUTPATIENT)
Dept: SURGERY | Age: 76
End: 2021-03-11
Payer: MEDICARE

## 2021-03-11 DIAGNOSIS — D12.0 ADENOMATOUS POLYP OF CECUM: Primary | ICD-10-CM

## 2021-03-11 PROCEDURE — 99024 POSTOP FOLLOW-UP VISIT: CPT | Performed by: SURGERY

## 2021-03-11 NOTE — PROGRESS NOTES
1. Have you been to the ER, urgent care clinic since your last visit? Hospitalized since your last visit? No     2. Have you seen or consulted any other health care providers outside of the [address] [place] [address] since your last visit? Include any pap smears or colon screening.  No

## 2021-03-12 DIAGNOSIS — Z11.59 NEED FOR HEPATITIS C SCREENING TEST: Primary | ICD-10-CM

## 2021-03-12 NOTE — PROGRESS NOTES
Patient returning call. Informed ordering Hep C screening. Per provider complete additional labs. Mailing lab slip today.

## 2021-03-15 NOTE — PROGRESS NOTES
[facility]      Clinic Note - Follow up    Juany Knapp returns for scheduled follow up today. She is s/p laparoscopic appendectomy with a small portion of cecum for an adenomatous polyp at the appendiceal orifice. Her final pathology showed fragments of residual serrated, adenomatous polyp. Margins were negative. The patient has done well since her surgery. She denies any significant complaints. She has some soreness at her incisions still but is otherwise doing well. Objective     There were no vitals taken for this visit. PE  GEN - Awake, alert, communicating appropriately. NAD  Pulm -normal inspiratory effort  Remainder of exam not performed for this virtual encounter. Labs  None      Imaging  None      Assessment     Davion Grande is a 66 y. o.yr old female s/p laparoscopic appendectomy with a small portion of cecum for an adenomatous polyp at the appendiceal orifice. Her final pathology showed fragments of residual serrated, adenomatous polyp. Margins were negative. Plan     The patient is doing well after surgery. I will see her back as needed in the future. She will continue screening colonoscopies as indicated with her gastroenterologist.      Afshan Spicer MD  3/11/2021    CC:  MD Dr. Juliette Allison

## 2021-03-18 LAB
ALBUMIN SERPL-MCNC: 4.2 G/DL (ref 3.7–4.7)
ALBUMIN/GLOB SERPL: 1.8 {RATIO} (ref 1.2–2.2)
ALP SERPL-CCNC: 86 IU/L (ref 39–117)
ALT SERPL-CCNC: 11 IU/L (ref 0–32)
AST SERPL-CCNC: 21 IU/L (ref 0–40)
BASOPHILS # BLD AUTO: 0 X10E3/UL (ref 0–0.2)
BASOPHILS NFR BLD AUTO: 1 %
BILIRUB SERPL-MCNC: <0.2 MG/DL (ref 0–1.2)
BUN SERPL-MCNC: 12 MG/DL (ref 8–27)
BUN/CREAT SERPL: 13 (ref 12–28)
CALCIUM SERPL-MCNC: 10.1 MG/DL (ref 8.7–10.3)
CHLORIDE SERPL-SCNC: 101 MMOL/L (ref 96–106)
CHOLEST SERPL-MCNC: 236 MG/DL (ref 100–199)
CO2 SERPL-SCNC: 29 MMOL/L (ref 20–29)
CREAT SERPL-MCNC: 0.9 MG/DL (ref 0.57–1)
EOSINOPHIL # BLD AUTO: 0.1 X10E3/UL (ref 0–0.4)
EOSINOPHIL NFR BLD AUTO: 4 %
ERYTHROCYTE [DISTWIDTH] IN BLOOD BY AUTOMATED COUNT: 12.9 % (ref 11.7–15.4)
GLOBULIN SER CALC-MCNC: 2.3 G/DL (ref 1.5–4.5)
GLUCOSE SERPL-MCNC: 89 MG/DL (ref 65–99)
HCT VFR BLD AUTO: 35.9 % (ref 34–46.6)
HCV AB S/CO SERPL IA: <0.1 S/CO RATIO (ref 0–0.9)
HDLC SERPL-MCNC: 65 MG/DL
HGB BLD-MCNC: 12.2 G/DL (ref 11.1–15.9)
IMM GRANULOCYTES # BLD AUTO: 0 X10E3/UL (ref 0–0.1)
IMM GRANULOCYTES NFR BLD AUTO: 0 %
LDLC SERPL CALC-MCNC: 151 MG/DL (ref 0–99)
LYMPHOCYTES # BLD AUTO: 1.4 X10E3/UL (ref 0.7–3.1)
LYMPHOCYTES NFR BLD AUTO: 43 %
MCH RBC QN AUTO: 32.4 PG (ref 26.6–33)
MCHC RBC AUTO-ENTMCNC: 34 G/DL (ref 31.5–35.7)
MCV RBC AUTO: 95 FL (ref 79–97)
MONOCYTES # BLD AUTO: 0.4 X10E3/UL (ref 0.1–0.9)
MONOCYTES NFR BLD AUTO: 12 %
NEUTROPHILS # BLD AUTO: 1.3 X10E3/UL (ref 1.4–7)
NEUTROPHILS NFR BLD AUTO: 40 %
PLATELET # BLD AUTO: 303 X10E3/UL (ref 150–450)
POTASSIUM SERPL-SCNC: 4.6 MMOL/L (ref 3.5–5.2)
PROT SERPL-MCNC: 6.5 G/DL (ref 6–8.5)
RBC # BLD AUTO: 3.77 X10E6/UL (ref 3.77–5.28)
SODIUM SERPL-SCNC: 140 MMOL/L (ref 134–144)
TRIGL SERPL-MCNC: 111 MG/DL (ref 0–149)
VIT B12 SERPL-MCNC: 592 PG/ML (ref 232–1245)
VLDLC SERPL CALC-MCNC: 20 MG/DL (ref 5–40)
WBC # BLD AUTO: 3.2 X10E3/UL (ref 3.4–10.8)

## 2021-03-24 ENCOUNTER — TELEPHONE (OUTPATIENT)
Dept: INTERNAL MEDICINE CLINIC | Age: 76
End: 2021-03-24

## 2021-03-24 RX ORDER — ACAMPROSATE CALCIUM 333 MG/1
TABLET, DELAYED RELEASE ORAL
Qty: 180 TAB | Refills: 1 | OUTPATIENT
Start: 2021-03-24

## 2021-03-24 NOTE — TELEPHONE ENCOUNTER
Kassie Briones () [phone] (H)     Pt's daughter is requesting a call back regarding her mother's vitamins. She says that they are not giving them to her where she is living right now.

## 2021-03-24 NOTE — TELEPHONE ENCOUNTER
Requested Prescriptions     Pending Prescriptions Disp Refills    acamprosate (CAMPRAL) 333 mg tablet 180 Tab 1               [id number] [address]  [facility]  [phone]

## 2021-03-24 NOTE — TELEPHONE ENCOUNTER
Spoke with daughter. Patient in longterm, facility can only administer what is prescribed. Patient will not be receiving calcium which is needed for osteoporosis. Asking if prescription for calcium, Vitamin D and multivitamin can be faxed to medical staff [phone] [facility]. Patient will be incarcerated x 1 month. Inmate ID [id number].

## 2021-03-26 DIAGNOSIS — F10.11 H/O ETOH ABUSE: Primary | ICD-10-CM

## 2021-03-26 RX ORDER — ACAMPROSATE CALCIUM 333 MG/1
TABLET, DELAYED RELEASE ORAL
Qty: 180 TAB | Refills: 1 | OUTPATIENT
Start: 2021-03-26

## 2021-03-26 NOTE — TELEPHONE ENCOUNTER
Requested Prescriptions     Pending Prescriptions Disp Refills    acamprosate (CAMPRAL) 333 mg tablet 180 Tab 1                       [id number] [address]  [facility]  [phone]

## 2021-03-26 NOTE — TELEPHONE ENCOUNTER
I recommend calcium in diet and supplement  To reach 1200 mg/day and vitamin D 1000 international units daily. She can take a MVI daily.

## 2021-03-26 NOTE — TELEPHONE ENCOUNTER
Notified provider daughter explained mother not eating anything other than bread while in facility. Per provider advise daughter no supplements needed at this time. Ok to be off vitamins. Notified daughter per provider recommendation. Understanding verbalized.

## 2021-06-15 ENCOUNTER — TELEPHONE (OUTPATIENT)
Dept: INTERNAL MEDICINE CLINIC | Age: 76
End: 2021-06-15

## 2021-06-15 NOTE — TELEPHONE ENCOUNTER
Erwin Sanchez [phone]    The patient is in nursing home and needs a mammogram. Please call to discuss how this can be done

## 2021-06-16 NOTE — TELEPHONE ENCOUNTER
Called and spoke with Nicole Juarez daughter. She explained patient due for mammogram. Patient is incarcerated and facility will allow patient to leave for testing. Advise do not have authorization to release information. She indicated have POA. Will fax copy for records. Once receive will call back to discuss further.

## 2021-06-25 ENCOUNTER — TELEPHONE (OUTPATIENT)
Dept: INTERNAL MEDICINE CLINIC | Age: 76
End: 2021-06-25

## 2021-06-25 NOTE — TELEPHONE ENCOUNTER
Notified Kassie sister(Ofelia) called requesting mammogram for patient. Informed per Dr Obed Castro received POA but only able to release info if patient is deemed not capable of making informed health care decisions. Provided scheduling # to call and set up appt for mammogram. Patient serving year sentence. Mailing order.

## 2022-03-10 ENCOUNTER — TRANSCRIBE ORDER (OUTPATIENT)
Dept: SCHEDULING | Age: 77
End: 2022-03-10

## 2022-03-10 DIAGNOSIS — Z12.31 VISIT FOR SCREENING MAMMOGRAM: Primary | ICD-10-CM

## 2022-03-18 PROBLEM — D12.0 ADENOMATOUS POLYP OF CECUM: Status: ACTIVE | Noted: 2021-03-01

## 2022-03-18 PROBLEM — N95.1 VAGINAL DRYNESS, MENOPAUSAL: Status: ACTIVE | Noted: 2019-12-05

## 2022-03-19 PROBLEM — F10.11 H/O ETOH ABUSE: Status: ACTIVE | Noted: 2020-09-01

## 2022-03-19 PROBLEM — F33.2 DEPRESSION, MAJOR, SEVERE RECURRENCE (HCC): Status: ACTIVE | Noted: 2020-12-17

## 2022-06-24 ENCOUNTER — TELEPHONE (OUTPATIENT)
Dept: INTERNAL MEDICINE CLINIC | Age: 77
End: 2022-06-24

## 2022-06-24 NOTE — TELEPHONE ENCOUNTER
----- Message from Scooby Dunham sent at 6/24/2022  3:13 PM EDT -----  Subject: Refill Request    QUESTIONS  Name of Medication? sertraline (ZOLOFT) 50 mg tablet  Patient-reported dosage and instructions? once daily  How many days do you have left? 0  [id number]  Pharmacy phone number (if available)? [phone]  ---------------------------------------------------------------------------  --------------  Saleem HSU  What is the best way for the office to contact you? OK to leave message on   voicemail  Preferred Call Back Phone Number? [phone]  ---------------------------------------------------------------------------  --------------  SCRIPT ANSWERS  Relationship to Patient?  Self

## 2022-06-27 ENCOUNTER — TELEPHONE (OUTPATIENT)
Dept: INTERNAL MEDICINE CLINIC | Age: 77
End: 2022-06-27

## 2022-06-27 NOTE — TELEPHONE ENCOUNTER
----- Message from Magno Weaver sent at 6/27/2022  9:24 AM EDT -----  Regarding: Prescriptiton Request  My next appointment is on August 4. I am out of Sertaline and Trazodone. Could I please have enough pills to last until 8/4? [facility]   Thanks, Claude Abarca

## 2022-06-30 ENCOUNTER — HOSPITAL ENCOUNTER (OUTPATIENT)
Dept: MAMMOGRAPHY | Age: 77
Discharge: HOME | End: 2022-06-30
Attending: INTERNAL MEDICINE
Payer: MEDICARE

## 2022-06-30 DIAGNOSIS — Z12.31 VISIT FOR SCREENING MAMMOGRAM: ICD-10-CM

## 2022-06-30 PROCEDURE — 77063 BREAST TOMOSYNTHESIS BI: CPT

## 2022-07-12 ENCOUNTER — HOSPITAL ENCOUNTER (OUTPATIENT)
Dept: MAMMOGRAPHY | Age: 77
Discharge: HOME | End: 2022-07-12
Attending: INTERNAL MEDICINE
Payer: MEDICARE

## 2022-07-12 DIAGNOSIS — R92.8 ABNORMAL MAMMOGRAM OF LEFT BREAST: ICD-10-CM

## 2022-07-12 PROCEDURE — 76642 ULTRASOUND BREAST LIMITED: CPT

## 2022-07-12 PROCEDURE — 77061 BREAST TOMOSYNTHESIS UNI: CPT

## 2022-07-12 NOTE — PROGRESS NOTES
Patient did not want to schedule Left Breast Biopsy at time of appointment. She would like to call back with her calendar to schedule. She was given a business card with Ana Paula's direct phone number.

## 2022-07-18 ENCOUNTER — HOSPITAL ENCOUNTER (OUTPATIENT)
Dept: MAMMOGRAPHY | Age: 77
Discharge: HOME | End: 2022-07-18
Attending: INTERNAL MEDICINE
Payer: MEDICARE

## 2022-07-18 DIAGNOSIS — R92.8 ABNORMAL MAMMOGRAM OF LEFT BREAST: ICD-10-CM

## 2022-07-18 PROCEDURE — 88377 M/PHMTRC ALYS ISHQUANT/SEMIQ: CPT

## 2022-07-18 PROCEDURE — 88360 TUMOR IMMUNOHISTOCHEM/MANUAL: CPT

## 2022-07-18 PROCEDURE — 74011000250 HC RX REV CODE- 250: Performed by: RADIOLOGY

## 2022-07-18 PROCEDURE — 88305 TISSUE EXAM BY PATHOLOGIST: CPT

## 2022-07-18 PROCEDURE — 19083 BX BREAST 1ST LESION US IMAG: CPT

## 2022-07-18 PROCEDURE — 77065 DX MAMMO INCL CAD UNI: CPT

## 2022-07-18 PROCEDURE — 88365 INSITU HYBRIDIZATION (FISH): CPT

## 2022-07-18 RX ORDER — LIDOCAINE HYDROCHLORIDE 10 MG/ML
15 INJECTION INFILTRATION; PERINEURAL
Status: COMPLETED | OUTPATIENT
Start: 2022-07-18 | End: 2022-07-18

## 2022-07-18 RX ORDER — LIDOCAINE HYDROCHLORIDE AND EPINEPHRINE 10; 10 MG/ML; UG/ML
10 INJECTION, SOLUTION INFILTRATION; PERINEURAL ONCE
Status: COMPLETED | OUTPATIENT
Start: 2022-07-18 | End: 2022-07-18

## 2022-07-18 RX ADMIN — LIDOCAINE HYDROCHLORIDE 15 ML: 10 INJECTION, SOLUTION INFILTRATION; PERINEURAL at 09:50

## 2022-07-18 RX ADMIN — LIDOCAINE HYDROCHLORIDE,EPINEPHRINE BITARTRATE 100 MG: 10; .01 INJECTION, SOLUTION INFILTRATION; PERINEURAL at 09:50

## 2022-07-18 NOTE — PROGRESS NOTES
Patient tolerated left breast biopsy well with scant bleeding. Biopsy site was bandaged using sterile skin glue and discharge instructions were reviewed with the patient. She was provided with a written copy as well. Advised patient that results should be available in 2-3 business days and that she will receive a phone call. Encouraged her to call with any questions or concerns.

## 2022-07-20 RX ORDER — ESTRADIOL 10 UG/1
TABLET VAGINAL
Qty: 24 TABLET | Refills: 0 | Status: SHIPPED | OUTPATIENT
Start: 2022-07-20 | End: 2022-07-25 | Stop reason: ALTCHOICE

## 2022-07-20 NOTE — PROGRESS NOTES
Dr. Amadeo Wallace called patient with pathology. Appointment was made with Dr. Suzanna Olivares on  7/25 at 36 for surgical consultation. Called patient with appointment information. She reports that the biopsy site is healing well and she has no concerns. Encouraged her to call with any questions.

## 2022-07-25 NOTE — PROGRESS NOTES
Breast biopsy positive for invasive carcinoma and she is scheduling appointment through [facility] for surgical consultation and oncology.

## 2022-07-25 NOTE — PROGRESS NOTES
Breast biopsy positive for invasive carcinoma and she is scheduling appointment through [facility] for surgical consultation and oncology.

## 2022-07-26 NOTE — TELEPHONE ENCOUNTER
Call pt and notify her to stop her estradiol vaginal tablet due to recent diagnosis of breast cancer. Call pharmacy and cancel refill if not already picked up.

## 2022-08-04 ENCOUNTER — OFFICE VISIT (OUTPATIENT)
Dept: INTERNAL MEDICINE CLINIC | Age: 77
End: 2022-08-04
Payer: MEDICARE

## 2022-08-04 VITALS
HEART RATE: 74 BPM | DIASTOLIC BLOOD PRESSURE: 70 MMHG | SYSTOLIC BLOOD PRESSURE: 110 MMHG | BODY MASS INDEX: 19.7 KG/M2 | WEIGHT: 115.4 LBS | RESPIRATION RATE: 16 BRPM | HEIGHT: 64 IN | TEMPERATURE: 97.8 F | OXYGEN SATURATION: 97 %

## 2022-08-04 DIAGNOSIS — F10.11 H/O ETOH ABUSE: ICD-10-CM

## 2022-08-04 DIAGNOSIS — G47.00 INSOMNIA, UNSPECIFIED TYPE: ICD-10-CM

## 2022-08-04 DIAGNOSIS — D12.0 ADENOMATOUS POLYP OF CECUM: ICD-10-CM

## 2022-08-04 DIAGNOSIS — Z00.00 MEDICARE ANNUAL WELLNESS VISIT, SUBSEQUENT: Primary | ICD-10-CM

## 2022-08-04 DIAGNOSIS — Z78.0 POSTMENOPAUSAL: ICD-10-CM

## 2022-08-04 DIAGNOSIS — F32.5 DEPRESSION, MAJOR, IN REMISSION (HCC): ICD-10-CM

## 2022-08-04 DIAGNOSIS — M85.89 OSTEOPENIA OF MULTIPLE SITES: ICD-10-CM

## 2022-08-04 DIAGNOSIS — E53.8 B12 DEFICIENCY: ICD-10-CM

## 2022-08-04 DIAGNOSIS — E78.00 HYPERCHOLESTEROLEMIA: ICD-10-CM

## 2022-08-04 DIAGNOSIS — C50.912 MALIGNANT NEOPLASM OF LEFT FEMALE BREAST, UNSPECIFIED ESTROGEN RECEPTOR STATUS, UNSPECIFIED SITE OF BREAST (HCC): ICD-10-CM

## 2022-08-04 PROCEDURE — G8420 CALC BMI NORM PARAMETERS: HCPCS | Performed by: INTERNAL MEDICINE

## 2022-08-04 PROCEDURE — 1090F PRES/ABSN URINE INCON ASSESS: CPT | Performed by: INTERNAL MEDICINE

## 2022-08-04 PROCEDURE — G0439 PPPS, SUBSEQ VISIT: HCPCS | Performed by: INTERNAL MEDICINE

## 2022-08-04 PROCEDURE — 1101F PT FALLS ASSESS-DOCD LE1/YR: CPT | Performed by: INTERNAL MEDICINE

## 2022-08-04 PROCEDURE — G8427 DOCREV CUR MEDS BY ELIG CLIN: HCPCS | Performed by: INTERNAL MEDICINE

## 2022-08-04 PROCEDURE — 99214 OFFICE O/P EST MOD 30 MIN: CPT | Performed by: INTERNAL MEDICINE

## 2022-08-04 PROCEDURE — G8510 SCR DEP NEG, NO PLAN REQD: HCPCS | Performed by: INTERNAL MEDICINE

## 2022-08-04 PROCEDURE — G8399 PT W/DXA RESULTS DOCUMENT: HCPCS | Performed by: INTERNAL MEDICINE

## 2022-08-04 PROCEDURE — G8536 NO DOC ELDER MAL SCRN: HCPCS | Performed by: INTERNAL MEDICINE

## 2022-08-04 RX ORDER — TOBRAMYCIN AND DEXAMETHASONE 3; 1 MG/ML; MG/ML
SUSPENSION/ DROPS OPHTHALMIC
COMMUNITY
Start: 2022-06-24 | End: 2022-08-04

## 2022-08-04 RX ORDER — SERTRALINE HYDROCHLORIDE 50 MG/1
50 TABLET, FILM COATED ORAL EVERY EVENING
Qty: 90 TABLET | Refills: 1 | Status: SHIPPED | OUTPATIENT
Start: 2022-08-04

## 2022-08-04 RX ORDER — FEXOFENADINE HCL AND PSEUDOEPHEDRINE HCI 60; 120 MG/1; MG/1
1 TABLET, EXTENDED RELEASE ORAL EVERY 12 HOURS
COMMUNITY

## 2022-08-04 RX ORDER — IPRATROPIUM BROMIDE 42 UG/1
SPRAY, METERED NASAL
COMMUNITY
Start: 2022-07-19

## 2022-08-04 RX ORDER — TRAZODONE HYDROCHLORIDE 50 MG/1
75 TABLET ORAL
Qty: 135 TABLET | Refills: 1 | Status: SHIPPED | OUTPATIENT
Start: 2022-08-04

## 2022-08-04 RX ORDER — AZELASTINE 1 MG/ML
SPRAY, METERED NASAL
COMMUNITY
Start: 2022-07-19

## 2022-08-04 NOTE — PATIENT INSTRUCTIONS

## 2022-08-04 NOTE — ASSESSMENT & PLAN NOTE
New diagnosis and is seeing [facility] breast surgeon/radiation physician and oncologist.  Planned procedure for this month left breast lumpectomy.

## 2022-08-04 NOTE — ASSESSMENT & PLAN NOTE
Continue calcium, vitamin D and recommend she start regular exercise regimen for bone health.   Repeat bone density

## 2022-08-04 NOTE — PROGRESS NOTES
This is the Subsequent Medicare Annual Wellness Exam, performed 12 months or more after the Initial AWV or the last Subsequent AWV    I have reviewed the patient's medical history in detail and updated the computerized patient record. Assessment/Plan   Education and counseling provided:  Are appropriate based on today's review and evaluation    1. Medicare annual wellness visit, subsequent     Depression Risk Factor Screening     3 most recent PHQ Screens 8/3/2022   PHQ Not Done -   Little interest or pleasure in doing things Not at all   Feeling down, depressed, irritable, or hopeless Not at all   Total Score PHQ 2 0   Trouble falling or staying asleep, or sleeping too much -   Feeling tired or having little energy -   Poor appetite, weight loss, or overeating -   Feeling bad about yourself - or that you are a failure or have let yourself or your family down -   Trouble concentrating on things such as school, work, reading, or watching TV -   Moving or speaking so slowly that other people could have noticed; or the opposite being so fidgety that others notice -   Thoughts of being better off dead, or hurting yourself in some way -   PHQ 9 Score -   How difficult have these problems made it for you to do your work, take care of your home and get along with others -       Alcohol & Drug Abuse Risk Screen   Do you average more than 1 drink per night or more than 7 drinks a week?: (P) No  On any one occasion in the past three months have you had more than 3 drinks containing alcohol?: (P) No          Functional Ability and Level of Safety   Hearing:  Hearing: (P) additional comments below  Hearing comments: (P) Need hearing aids    Activities of Daily Living: The home contains: (P) no safety equipment  Functional ADLs: (P) Patient does total self care   Ambulation:  Patient ambulates: (P) with no difficulty   Fall Risk:  Fall Risk Assessment, last 12 mths 8/4/2022   Able to walk?  Yes   Fall in past 12 months? 0 Do you feel unsteady? 0   Are you worried about falling 0   Is TUG test greater than 12 seconds? -   Is the gait abnormal? -   Number of falls in past 12 months -   Fall with injury?  -     Abuse Screen:  Do you ever feel afraid of your partner?: (P) No  Are you in a relationship with someone who physically or mentally threatens you?: (P) No  Is it safe for you to go home?: Yes      Cognitive Screening   Has your family/caregiver stated any concerns about your memory?: (P) No   Cognitive Screening: MMSE 29/30    Health Maintenance Due     Health Maintenance Due   Topic Date Due    COVID-19 Vaccine (3 - Booster for [place] series) 07/17/2021       Patient Care Team   Patient Care Team:  Marielena Buchanan MD as PCP - [facility] Shefali Hernandez MD as PCP - [facility] Provider  Caryl Miller MD (Hematology and Oncology)    History     Patient Active Problem List   Diagnosis Code    Osteopenia M85.80    Allergic rhinitis J30.9    Hypercholesterolemia E78.00    Vaginal dryness, menopausal N95.1    H/O ETOH abuse F10.11    Depression, major, severe recurrence ([address] F33.2    Adenomatous polyp of cecum D12.0     Past Medical History:   Diagnosis Date    Alcohol abuse counseling and surveillance of alcoholic     Allergic rhinitis, cause unspecified 12/17/2009    Anemia NEC     Arthritis     Benign neoplasm of colon     Cancer ([address] 2020    BASAL CELL ON NECK     Chronic pain     Depression     Diarrhea     High cholesterol     Hypercholesterolemia     Menopause     LMP-Unknown    Osteopenia 12/17/2009    Osteopenia       Past Surgical History:   Procedure Laterality Date    COLONOSCOPY N/A 11/30/2017    COLONOSCOPY performed by Chon Austin MD at [facility] ENDOSCOPY    COLONOSCOPY N/A 1/29/2021    COLONOSCOPY   :- performed by June Rizzo MD at [facility]. [address], COLON, DIAGNOSTIC  7/06    due repeat 2011 per note    HX ADENOIDECTOMY  1947    HX APPENDECTOMY  03/01/2021    HX BREAST BIOPSY Left Unknown Benign surgical biopsy    HX BUNIONECTOMY      HX GI      COLONOSCOPY    HX HYSTERECTOMY      Partial    HX KNEE ARTHROSCOPY Right 11/1992    ACL - right knee    HX ORTHOPAEDIC Right 2014    BUNIONECTOMY    HX ORTHOPAEDIC  2015    left thumb surgery    HX OTHER SURGICAL  2020    BCCA REMOVED FROM NECK     HX POLYPECTOMY      HX TONSILLECTOMY  1947    HX UROLOGICAL      BLADDER TUCK    NV BREAST SURGERY PROCEDURE UNLISTED      left & rt. bx - benign    US GUIDED CORE BREAST BIOPSY Right Unknown    Benign     Current Outpatient Medications   Medication Sig Dispense Refill    azelastine (ASTELIN) 137 mcg (0.1 %) nasal spray ADMINISTER 2 SPRAYS INTO EACH NOSTRIL 2 TIMES DAILY. USE IN EACH NOSTRIL AS DIRECTED      ipratropium (ATROVENT) 42 mcg (0.06 %) nasal spray ADMINISTER 2 SPRAYS INTO EACH NOSTRIL 2 TIMES DAILY FOR 21 DAYS. tobramycin-dexamethasone (TOBRADEX) ophthalmic suspension PLEASE SEE ATTACHED FOR DETAILED DIRECTIONS      sertraline (ZOLOFT) 50 mg tablet TAKE 1 TABLET BY MOUTH EVERY DAY IN THE EVENING 30 Tablet 1    traZODone (DESYREL) 50 mg tablet TAKE 1 & 1/2 TABLET BY MOUTH AT BEDTIME 45 Tablet 1    diclofenac (VOLTAREN) 1 % gel APPLY 2 GRAMS 4 TIMES DAILY FOR 30 DAYS      ezetimibe (ZETIA) 10 mg tablet TK 1 T PO QD 90 Tab 3    fexofenadine-pseudoephedrine (ALLEGRA-D)  mg Tb12 Take 1 Tab by mouth every twelve (12) hours. betamethasone valerate (LUXIQ) 0.12 % topical foam MARGO FOAM EXT TO THE SCALP BID FOR RASH  1    CHOLECALCIFEROL, VITAMIN D3, (VITAMIN D3 PO) Take 400 Units by mouth daily. PV W-O MARIEL/FERROUS FUMARATE/FA (M-VIT PO) Take  by mouth. Takes one multivitamin per day. CALCIUM CARBONATE (CALCIUM 300 PO) Take 300 mg by mouth daily. naltrexone (DEPADE) 50 mg tablet Take 1 Tab by mouth daily.  90 Tab 1    acamprosate (CAMPRAL) 333 mg tablet TAKE 2 TABLETS BY MOUTH 3 TIMES A  Tab 1    neomycin-polymyxin-dexamethasone (MAXITROL) ophthalmic suspension Administer 2 Drops to both eyes two (2) times a day. colestipoL (COLESTID) 1 gram tablet TAKE 2 TABLETS BY MOUTH TWICE A DAY. (TAKE 1 HOUR AFTER OR 4 HOURS BEFORE OTHER MEDS)      gabapentin (NEURONTIN) 100 mg capsule Take 100 mg by mouth nightly. aspirin delayed-release 81 mg tablet Take  by mouth daily. fluticasone propionate (FLONASE NA) by Nasal route. Allergies   Allergen Reactions    Lipitor [Atorvastatin] Myalgia    Oxycodone Other (comments)     Unable to walk, \"completely knocks me out\"    Sulfa (Sulfonamide Antibiotics) Hives    Zocor [Simvastatin] Myalgia       Family History   Problem Relation Age of Onset    Breast Cancer Mother 48    Heart Disease Father     Breast Cancer Maternal Grandmother 48    Other Brother         TIA    Coronary Art Dis Paternal Uncle     Anesth Problems Neg Hx      Social History     Tobacco Use    Smoking status: Former     Packs/day: 1.00     Years: 30.00     Pack years: 30.00     Types: Cigarettes     Quit date: 1987     Years since quittin.6    Smokeless tobacco: Never    Tobacco comments:     former cigarette smoker   Substance Use Topics    Alcohol use: Not Currently         Linda Minaya MD   Mitra Nuno is a 76 y.o. female who was also seen today for a follow-up visit. Assessment & Plan:   1. Medicare annual wellness visit, subsequent  Health maintenance reviewed and updated with patient today at visit. 2. Postmenopausal  -     DEXA BONE DENSITY STUDY AXIAL; Future  3. Depression, major, in remission [facility])  Assessment & Plan:   well controlled, continue current medications  Orders:  -     sertraline (ZOLOFT) 50 mg tablet; Take 1 Tablet by mouth every evening., Normal, Disp-90 Tablet, R-1  4. Insomnia, unspecified type  Assessment & Plan:   well controlled, continue current medications  5. Osteopenia of multiple sites  Assessment & Plan:   Continue calcium, vitamin D and recommend she start regular exercise regimen for bone health.   Repeat bone density  6. Hypercholesterolemia  Assessment & Plan:   well controlled, continue current medications pending work up below  Orders:  -     CBC WITH AUTOMATED DIFF; Future  -     LIPID PANEL; Future  -     METABOLIC PANEL, COMPREHENSIVE; Future  -     TSH 3RD GENERATION; Future  7. Malignant neoplasm of left female breast, unspecified estrogen receptor status, unspecified site of breast [facility])  Assessment & Plan:   New diagnosis and is seeing [facility] breast surgeon/radiation physician and oncologist.  Planned procedure for this month left breast lumpectomy. 8. H/O ETOH abuse  Assessment & Plan:   Continue with her AA program.    9. Adenomatous polyp of cecum  Assessment & Plan:   It appears she is due for repeat colonoscopy in 2026. 10. B12 deficiency  -     VITAMIN B12; Future . Follow-up 6 months    Subjective:   Ramu Nava was also seen for follow up depression, insomnia,hypercholesterolemia, and has new dx of breast cancer. She has been seen by [address] surgeon, radiation and oncology. She is scheduled left breast lumpectomy for 8/12/22 but may change to Sept due to insurance issues. Taking medication with no side effects. Exercise: none Diet eating healthy low salt low chol. Denies depression or SI. Review of Systems   Constitutional:  Negative for fever, malaise/fatigue and weight loss. HENT:  Negative for congestion and sore throat. Eyes: Negative. Respiratory:  Negative for cough and shortness of breath. Cardiovascular:  Negative for chest pain and leg swelling. Gastrointestinal:  Negative for abdominal pain, blood in stool, constipation, diarrhea, heartburn and nausea. Genitourinary:  Negative for dysuria, frequency and urgency. Musculoskeletal:  Negative for back pain and joint pain. Skin:  Negative for rash. Neurological:  Negative for dizziness, sensory change, focal weakness and headaches. Psychiatric/Behavioral:  Negative for depression.  The patient is nervous/anxious (Reports recent increase feeling of stress over her recent diagnosis of breast cancer.) and has insomnia (Controlled with trazodone. Reports sleeping well. ). - per HPI    Physical Exam  Vitals and nursing note reviewed. Constitutional:       General: She is not in acute distress. Appearance: She is well-developed. HENT:      Head: Normocephalic and atraumatic. Right Ear: Tympanic membrane and ear canal normal.      Left Ear: Tympanic membrane and ear canal normal.      Nose: Nose normal.      Mouth/Throat:      Mouth: Mucous membranes are moist.      Pharynx: No posterior oropharyngeal erythema. Eyes:      Extraocular Movements: Extraocular movements intact. Conjunctiva/sclera: Conjunctivae normal.      Pupils: Pupils are equal, round, and reactive to light. Neck:      Thyroid: No thyromegaly. Cardiovascular:      Rate and Rhythm: Normal rate and regular rhythm. Heart sounds: Normal heart sounds. No murmur heard. Pulmonary:      Effort: Pulmonary effort is normal.      Breath sounds: Normal breath sounds. Abdominal:      General: Bowel sounds are normal.      Palpations: Abdomen is soft. There is no mass. Tenderness: There is no abdominal tenderness. Musculoskeletal:      Cervical back: Normal range of motion. Right lower leg: No edema. Left lower leg: No edema. Lymphadenopathy:      Cervical: No cervical adenopathy. Skin:     Findings: No rash. Neurological:      Cranial Nerves: No cranial nerve deficit. Sensory: No sensory deficit.       Comments: MMSE 29/30   Psychiatric:         Mood and Affect: Mood normal.     Visit Vitals  /70 (BP 1 Location: Left upper arm, BP Patient Position: Sitting, BP Cuff Size: Small adult)   Pulse 74   Temp 97.8 °F (36.6 °C) (Temporal)   Resp 16   Ht 5' 4\" (1.626 m)   Wt 115 lb 6.4 oz (52.3 kg)   LMP  (LMP Unknown)   SpO2 97%   BMI 19.81 kg/m²        Aspects of this note may have been generated using voice recognition software. Despite editing, there may be some syntax errors   I have discussed the diagnosis with the patient and the intended plan as seen in the above orders. The patient has received an after-visit summary and questions were answered concerning future plans. I have discussed any recommended medication side effects and warnings with the patient as well.   She has expressed understanding of the diagnosis and plan    Kevin Martinez MD

## 2022-08-29 ENCOUNTER — APPOINTMENT (OUTPATIENT)
Dept: INTERNAL MEDICINE CLINIC | Age: 77
End: 2022-08-29

## 2022-09-16 LAB
ALBUMIN SERPL-MCNC: 4.3 G/DL (ref 3.7–4.7)
ALBUMIN/GLOB SERPL: 2 {RATIO} (ref 1.2–2.2)
ALP SERPL-CCNC: 84 IU/L (ref 44–121)
ALT SERPL-CCNC: 11 IU/L (ref 0–32)
AST SERPL-CCNC: 21 IU/L (ref 0–40)
BASOPHILS # BLD AUTO: 0 X10E3/UL (ref 0–0.2)
BASOPHILS NFR BLD AUTO: 1 %
BILIRUB SERPL-MCNC: 0.3 MG/DL (ref 0–1.2)
BUN SERPL-MCNC: 11 MG/DL (ref 8–27)
BUN/CREAT SERPL: 14 (ref 12–28)
CALCIUM SERPL-MCNC: 9.3 MG/DL (ref 8.7–10.3)
CHLORIDE SERPL-SCNC: 100 MMOL/L (ref 96–106)
CHOLEST SERPL-MCNC: 245 MG/DL (ref 100–199)
CO2 SERPL-SCNC: 28 MMOL/L (ref 20–29)
CREAT SERPL-MCNC: 0.76 MG/DL (ref 0.57–1)
EGFR: 80 ML/MIN/1.73
EOSINOPHIL # BLD AUTO: 0.1 X10E3/UL (ref 0–0.4)
EOSINOPHIL NFR BLD AUTO: 2 %
ERYTHROCYTE [DISTWIDTH] IN BLOOD BY AUTOMATED COUNT: 12 % (ref 11.7–15.4)
GLOBULIN SER CALC-MCNC: 2.1 G/DL (ref 1.5–4.5)
GLUCOSE SERPL-MCNC: 88 MG/DL (ref 65–99)
HCT VFR BLD AUTO: 37.6 % (ref 34–46.6)
HDLC SERPL-MCNC: 71 MG/DL
HGB BLD-MCNC: 12.3 G/DL (ref 11.1–15.9)
IMM GRANULOCYTES # BLD AUTO: 0 X10E3/UL (ref 0–0.1)
IMM GRANULOCYTES NFR BLD AUTO: 0 %
LDLC SERPL CALC-MCNC: 162 MG/DL (ref 0–99)
LYMPHOCYTES # BLD AUTO: 0.7 X10E3/UL (ref 0.7–3.1)
LYMPHOCYTES NFR BLD AUTO: 15 %
MCH RBC QN AUTO: 31.1 PG (ref 26.6–33)
MCHC RBC AUTO-ENTMCNC: 32.7 G/DL (ref 31.5–35.7)
MCV RBC AUTO: 95 FL (ref 79–97)
MONOCYTES # BLD AUTO: 0.4 X10E3/UL (ref 0.1–0.9)
MONOCYTES NFR BLD AUTO: 8 %
NEUTROPHILS # BLD AUTO: 3.3 X10E3/UL (ref 1.4–7)
NEUTROPHILS NFR BLD AUTO: 74 %
PLATELET # BLD AUTO: 218 X10E3/UL (ref 150–450)
POTASSIUM SERPL-SCNC: 4.2 MMOL/L (ref 3.5–5.2)
PROT SERPL-MCNC: 6.4 G/DL (ref 6–8.5)
RBC # BLD AUTO: 3.96 X10E6/UL (ref 3.77–5.28)
SODIUM SERPL-SCNC: 139 MMOL/L (ref 134–144)
TRIGL SERPL-MCNC: 70 MG/DL (ref 0–149)
TSH SERPL DL<=0.005 MIU/L-ACNC: 2.12 UIU/ML (ref 0.45–4.5)
VIT B12 SERPL-MCNC: 488 PG/ML (ref 232–1245)
VLDLC SERPL CALC-MCNC: 12 MG/DL (ref 5–40)
WBC # BLD AUTO: 4.5 X10E3/UL (ref 3.4–10.8)

## 2022-10-12 NOTE — TELEPHONE ENCOUNTER
Call patient and confirm she is requesting this medication as it appears that it was removed from her medication list as though she was no longer taking.

## 2022-10-13 RX ORDER — ESTRADIOL 10 UG/1
TABLET VAGINAL
Qty: 24 TABLET | Refills: 0 | OUTPATIENT
Start: 2022-10-13

## 2023-03-14 RX ORDER — TRAZODONE HYDROCHLORIDE 50 MG/1
TABLET ORAL
Qty: 135 TABLET | Refills: 1 | Status: SHIPPED | OUTPATIENT
Start: 2023-03-14

## 2023-04-13 NOTE — TELEPHONE ENCOUNTER
Notified patient per provider will discuss further at upcoming appointment. Understanding verbalized. [Negative] : Heme/Lymph

## 2023-06-20 ENCOUNTER — OFFICE VISIT (OUTPATIENT)
Age: 78
End: 2023-06-20
Payer: COMMERCIAL

## 2023-06-20 VITALS
BODY MASS INDEX: 19.97 KG/M2 | OXYGEN SATURATION: 99 % | HEIGHT: 64 IN | WEIGHT: 117 LBS | TEMPERATURE: 97.7 F | SYSTOLIC BLOOD PRESSURE: 123 MMHG | HEART RATE: 64 BPM | DIASTOLIC BLOOD PRESSURE: 74 MMHG | RESPIRATION RATE: 16 BRPM

## 2023-06-20 DIAGNOSIS — C50.912 MALIGNANT NEOPLASM OF LEFT FEMALE BREAST, UNSPECIFIED ESTROGEN RECEPTOR STATUS, UNSPECIFIED SITE OF BREAST (HCC): ICD-10-CM

## 2023-06-20 DIAGNOSIS — B96.89 ACUTE BACTERIAL SIALADENITIS: Primary | ICD-10-CM

## 2023-06-20 DIAGNOSIS — K11.21 ACUTE BACTERIAL SIALADENITIS: Primary | ICD-10-CM

## 2023-06-20 DIAGNOSIS — G47.00 INSOMNIA, UNSPECIFIED TYPE: ICD-10-CM

## 2023-06-20 DIAGNOSIS — E78.00 HYPERCHOLESTEROLEMIA: ICD-10-CM

## 2023-06-20 DIAGNOSIS — F32.5 DEPRESSION, MAJOR, IN REMISSION (HCC): ICD-10-CM

## 2023-06-20 PROCEDURE — 99214 OFFICE O/P EST MOD 30 MIN: CPT | Performed by: INTERNAL MEDICINE

## 2023-06-20 PROCEDURE — 1123F ACP DISCUSS/DSCN MKR DOCD: CPT | Performed by: INTERNAL MEDICINE

## 2023-06-20 RX ORDER — FEXOFENADINE HYDROCHLORIDE 60 MG/1
60 TABLET, FILM COATED ORAL DAILY
COMMUNITY
End: 2023-06-20

## 2023-06-20 RX ORDER — CLINDAMYCIN PHOSPHATE 20 MG/G
CREAM VAGINAL
COMMUNITY
Start: 2022-09-15

## 2023-06-20 RX ORDER — AMOXICILLIN AND CLAVULANATE POTASSIUM 875; 125 MG/1; MG/1
1 TABLET, FILM COATED ORAL 2 TIMES DAILY
Qty: 20 TABLET | Refills: 0 | Status: SHIPPED | OUTPATIENT
Start: 2023-06-20 | End: 2023-06-30

## 2023-06-20 RX ORDER — FEXOFENADINE HCL 180 MG/1
180 TABLET ORAL DAILY
COMMUNITY

## 2023-06-20 RX ORDER — ASPIRIN 81 MG/1
TABLET ORAL
COMMUNITY
End: 2023-06-20

## 2023-06-20 RX ORDER — FLUTICASONE PROPIONATE 50 MCG
1 SPRAY, SUSPENSION (ML) NASAL DAILY
COMMUNITY
Start: 2023-02-15

## 2023-06-20 RX ORDER — CALCIUM POLYCARBOPHIL 625 MG
625 TABLET ORAL DAILY
Qty: 30 TABLET | Refills: 11 | COMMUNITY
Start: 2022-09-30 | End: 2023-09-30

## 2023-06-20 SDOH — ECONOMIC STABILITY: HOUSING INSECURITY
IN THE LAST 12 MONTHS, WAS THERE A TIME WHEN YOU DID NOT HAVE A STEADY PLACE TO SLEEP OR SLEPT IN A SHELTER (INCLUDING NOW)?: NO

## 2023-06-20 SDOH — ECONOMIC STABILITY: TRANSPORTATION INSECURITY
IN THE PAST 12 MONTHS, HAS LACK OF TRANSPORTATION KEPT YOU FROM MEETINGS, WORK, OR FROM GETTING THINGS NEEDED FOR DAILY LIVING?: NO

## 2023-06-20 SDOH — ECONOMIC STABILITY: INCOME INSECURITY: HOW HARD IS IT FOR YOU TO PAY FOR THE VERY BASICS LIKE FOOD, HOUSING, MEDICAL CARE, AND HEATING?: NOT HARD AT ALL

## 2023-06-20 SDOH — ECONOMIC STABILITY: FOOD INSECURITY: WITHIN THE PAST 12 MONTHS, YOU WORRIED THAT YOUR FOOD WOULD RUN OUT BEFORE YOU GOT MONEY TO BUY MORE.: NEVER TRUE

## 2023-06-20 SDOH — ECONOMIC STABILITY: FOOD INSECURITY: WITHIN THE PAST 12 MONTHS, THE FOOD YOU BOUGHT JUST DIDN'T LAST AND YOU DIDN'T HAVE MONEY TO GET MORE.: NEVER TRUE

## 2023-06-20 ASSESSMENT — PATIENT HEALTH QUESTIONNAIRE - PHQ9
SUM OF ALL RESPONSES TO PHQ QUESTIONS 1-9: 0
3. TROUBLE FALLING OR STAYING ASLEEP: 0
9. THOUGHTS THAT YOU WOULD BE BETTER OFF DEAD, OR OF HURTING YOURSELF: 0
1. LITTLE INTEREST OR PLEASURE IN DOING THINGS: 0
SUM OF ALL RESPONSES TO PHQ9 QUESTIONS 1 & 2: 0
2. FEELING DOWN, DEPRESSED OR HOPELESS: 0
SUM OF ALL RESPONSES TO PHQ QUESTIONS 1-9: 0
6. FEELING BAD ABOUT YOURSELF - OR THAT YOU ARE A FAILURE OR HAVE LET YOURSELF OR YOUR FAMILY DOWN: 0
8. MOVING OR SPEAKING SO SLOWLY THAT OTHER PEOPLE COULD HAVE NOTICED. OR THE OPPOSITE, BEING SO FIGETY OR RESTLESS THAT YOU HAVE BEEN MOVING AROUND A LOT MORE THAN USUAL: 0
SUM OF ALL RESPONSES TO PHQ QUESTIONS 1-9: 0
SUM OF ALL RESPONSES TO PHQ9 QUESTIONS 1 & 2: 0
7. TROUBLE CONCENTRATING ON THINGS, SUCH AS READING THE NEWSPAPER OR WATCHING TELEVISION: 0
4. FEELING TIRED OR HAVING LITTLE ENERGY: 0
SUM OF ALL RESPONSES TO PHQ QUESTIONS 1-9: 0
SUM OF ALL RESPONSES TO PHQ QUESTIONS 1-9: 0
1. LITTLE INTEREST OR PLEASURE IN DOING THINGS: 0
2. FEELING DOWN, DEPRESSED OR HOPELESS: 0
5. POOR APPETITE OR OVEREATING: 0
SUM OF ALL RESPONSES TO PHQ QUESTIONS 1-9: 0
10. IF YOU CHECKED OFF ANY PROBLEMS, HOW DIFFICULT HAVE THESE PROBLEMS MADE IT FOR YOU TO DO YOUR WORK, TAKE CARE OF THINGS AT HOME, OR GET ALONG WITH OTHER PEOPLE: 0

## 2023-06-20 ASSESSMENT — ENCOUNTER SYMPTOMS: SHORTNESS OF BREATH: 0

## 2023-06-20 NOTE — ASSESSMENT & PLAN NOTE
Monitored by specialist- no acute findings meriting change in the plan   She has declined medication.

## 2023-06-20 NOTE — PROGRESS NOTES
Chriss Swain is a [de-identified] y.o. female who was seen in clinic today (6/20/2023) for an acute visit. Assessment & Plan:   Below is the assessment and plan developed based on review of pertinent history, physical exam, labs, studies, and medications. 1. Acute bacterial sialadenitis  Comments:  given H/O and reviewed dx and will start treatment with augmentin x 10 days and recommend advil prn pain and ice/heat. sugar free candy. if not improving over next week would refer to ENT. 2. Insomnia, unspecified type  Assessment & Plan:   Well-controlled, continue current medications  3. Depression, major, in remission [facility])  Assessment & Plan:   Well-controlled, continue current medications  4. Hypercholesterolemia  Assessment & Plan:   stable continue zetia  5. Malignant neoplasm of left female breast, unspecified estrogen receptor status, unspecified site of breast [facility])  Assessment & Plan:   Monitored by specialist- no acute findings meriting change in the plan   She has declined medication. Return in about 3 months (around 9/20/2023) for [address] Subjective/Objective:   Regina Drake was seen today for Facial Swelling  She had onset yesterday of area of swelling right lower jaw  and notes tenderness. No fever or uri sx. She was actually seen at her dentist yesterday and no problem with her teeth on the right side. She does have a broken crown on the left side. She is also follow up on her depression and insomnia. She is taking 1/2 trazodone with good sleep. Depression is well controlled. Prior to Admission medications    Medication Sig Start Date End Date Taking?  Authorizing Provider   POLYETHYL GLYCOL-PROPYL GLYCOL OP See Instructions, PRN: Dry eyes, 1 Drops Both Eyes, 0 Refills 1/27/20  Yes Historical Provider, MD   CALCIUM CARBONATE-VITAMIN D PO Take 300 mg by mouth   Yes Historical Provider, MD   Calcium Polycarbophil (FIBER) 625 MG TABS Take 1 tablet by mouth daily 9/30/22 9/30/23 Yes Historical Provider, MD

## 2023-06-22 ENCOUNTER — TELEPHONE (OUTPATIENT)
Age: 78
End: 2023-06-22

## 2023-06-22 NOTE — TELEPHONE ENCOUNTER
Reason for call:  Spoke with pt. Per pt her face still swelling and it is not getting any better. Pt need an dave with ENT .   Pt requested a call back     Is this a new problem: Yes    Date of last appointment:  6/20/2023     Can we respond via [employer]: No    Best call back number: Forrest Monroe  [phone]

## 2023-06-23 ENCOUNTER — HOSPITAL ENCOUNTER (EMERGENCY)
Facility: HOSPITAL | Age: 78
Discharge: HOME | End: 2023-06-23
Attending: STUDENT IN AN ORGANIZED HEALTH CARE EDUCATION/TRAINING PROGRAM
Payer: MEDICARE

## 2023-06-23 ENCOUNTER — APPOINTMENT (OUTPATIENT)
Facility: HOSPITAL | Age: 78
End: 2023-06-23
Payer: MEDICARE

## 2023-06-23 VITALS
SYSTOLIC BLOOD PRESSURE: 126 MMHG | TEMPERATURE: 98.3 F | RESPIRATION RATE: 16 BRPM | HEART RATE: 87 BPM | OXYGEN SATURATION: 99 % | DIASTOLIC BLOOD PRESSURE: 71 MMHG

## 2023-06-23 DIAGNOSIS — L08.9 SOFT TISSUE INFECTION: Primary | ICD-10-CM

## 2023-06-23 LAB
ALBUMIN SERPL-MCNC: 3.4 G/DL (ref 3.5–5)
ALBUMIN/GLOB SERPL: 1 (ref 1.1–2.2)
ALP SERPL-CCNC: 77 U/L (ref 45–117)
ALT SERPL-CCNC: 19 U/L (ref 12–78)
ANION GAP SERPL CALC-SCNC: 3 MMOL/L (ref 5–15)
AST SERPL-CCNC: 17 U/L (ref 15–37)
BASOPHILS # BLD: 0 K/UL (ref 0–0.1)
BASOPHILS NFR BLD: 1 % (ref 0–1)
BILIRUB SERPL-MCNC: 0.3 MG/DL (ref 0.2–1)
BUN SERPL-MCNC: 22 MG/DL (ref 6–20)
BUN/CREAT SERPL: 23 (ref 12–20)
CALCIUM SERPL-MCNC: 8.9 MG/DL (ref 8.5–10.1)
CHLORIDE SERPL-SCNC: 104 MMOL/L (ref 97–108)
CO2 SERPL-SCNC: 31 MMOL/L (ref 21–32)
CREAT SERPL-MCNC: 0.96 MG/DL (ref 0.55–1.02)
DIFFERENTIAL METHOD BLD: ABNORMAL
EOSINOPHIL # BLD: 0.1 K/UL (ref 0–0.4)
EOSINOPHIL NFR BLD: 2 % (ref 0–7)
ERYTHROCYTE [DISTWIDTH] IN BLOOD BY AUTOMATED COUNT: 12.5 % (ref 11.5–14.5)
GLOBULIN SER CALC-MCNC: 3.4 G/DL (ref 2–4)
GLUCOSE SERPL-MCNC: 131 MG/DL (ref 65–100)
HCT VFR BLD AUTO: 34.4 % (ref 35–47)
HGB BLD-MCNC: 11.5 G/DL (ref 11.5–16)
IMM GRANULOCYTES # BLD AUTO: 0 K/UL (ref 0–0.04)
IMM GRANULOCYTES NFR BLD AUTO: 0 % (ref 0–0.5)
LYMPHOCYTES # BLD: 1 K/UL (ref 0.8–3.5)
LYMPHOCYTES NFR BLD: 24 % (ref 12–49)
MCH RBC QN AUTO: 31.8 PG (ref 26–34)
MCHC RBC AUTO-ENTMCNC: 33.4 G/DL (ref 30–36.5)
MCV RBC AUTO: 95 FL (ref 80–99)
MONOCYTES # BLD: 0.4 K/UL (ref 0–1)
MONOCYTES NFR BLD: 10 % (ref 5–13)
NEUTS SEG # BLD: 2.5 K/UL (ref 1.8–8)
NEUTS SEG NFR BLD: 63 % (ref 32–75)
NRBC # BLD: 0 K/UL (ref 0–0.01)
NRBC BLD-RTO: 0 PER 100 WBC
PLATELET # BLD AUTO: 218 K/UL (ref 150–400)
PMV BLD AUTO: 9.2 FL (ref 8.9–12.9)
POTASSIUM SERPL-SCNC: 4 MMOL/L (ref 3.5–5.1)
PROT SERPL-MCNC: 6.8 G/DL (ref 6.4–8.2)
RBC # BLD AUTO: 3.62 M/UL (ref 3.8–5.2)
SODIUM SERPL-SCNC: 138 MMOL/L (ref 136–145)
WBC # BLD AUTO: 4 K/UL (ref 3.6–11)

## 2023-06-23 PROCEDURE — 36415 COLL VENOUS BLD VENIPUNCTURE: CPT

## 2023-06-23 PROCEDURE — 6360000004 HC RX CONTRAST MEDICATION: Performed by: RADIOLOGY

## 2023-06-23 PROCEDURE — 85025 COMPLETE CBC W/AUTO DIFF WBC: CPT

## 2023-06-23 PROCEDURE — 80053 COMPREHEN METABOLIC PANEL: CPT

## 2023-06-23 PROCEDURE — 99285 EMERGENCY DEPT VISIT HI MDM: CPT

## 2023-06-23 PROCEDURE — 70487 CT MAXILLOFACIAL W/DYE: CPT

## 2023-06-23 RX ORDER — CLINDAMYCIN HYDROCHLORIDE 300 MG/1
300 CAPSULE ORAL 2 TIMES DAILY
Qty: 14 CAPSULE | Refills: 0 | Status: SHIPPED | OUTPATIENT
Start: 2023-06-23 | End: 2023-06-30

## 2023-06-23 RX ADMIN — IOPAMIDOL 100 ML: 612 INJECTION, SOLUTION INTRAVENOUS at 11:53

## 2023-06-23 ASSESSMENT — ENCOUNTER SYMPTOMS
NAUSEA: 0
COUGH: 0
COLOR CHANGE: 0
ABDOMINAL PAIN: 0
ABDOMINAL DISTENTION: 0
SHORTNESS OF BREATH: 0
FACIAL SWELLING: 1
VOMITING: 0
TROUBLE SWALLOWING: 0
SINUS PRESSURE: 0
EYE REDNESS: 0
EYE PAIN: 0
SINUS PAIN: 0

## 2023-06-23 NOTE — ED TRIAGE NOTES
Pt c/o lump to her right jaw since Monday, has been on antibiotics, denies injury or fall, denies scratches or bites , denies dental pain, denies fever

## 2023-06-23 NOTE — ED NOTES
Patient left ED in no acute distress, alert and oriented x4. Patient was encouraged to come back if symptoms get worse. Patient was provided with discharge instructions and prescriptions. All questions were answered. Patient left ambulatory.          Sandi Pham LPN  78/22/51 [id number]

## 2023-06-23 NOTE — ED PROVIDER NOTES
[facility] EMERGENCY DEP  EMERGENCY DEPARTMENT ENCOUNTER      Pt Name: Joana Wilder  MRN: [id number]  Beck[place]yle 1945  Date of evaluation: 6/23/2023  Provider: MARSHA Penn NP      HISTORY OF PRESENT ILLNESS      This is an 49-year-old female who presents ambulatory to the emergency room with complaints of a lump to the right side of her jaw since Monday. The history is provided by the patient. Nursing Notes were reviewed. REVIEW OF SYSTEMS         Review of Systems   Constitutional:  Negative for appetite change, chills, diaphoresis, fatigue and fever. HENT:  Positive for facial swelling (right sided). Negative for congestion, ear discharge, ear pain, sinus pressure, sinus pain and trouble swallowing. Eyes:  Negative for pain, redness and visual disturbance. Respiratory:  Negative for cough and shortness of breath. Cardiovascular:  Negative for chest pain and palpitations. Gastrointestinal:  Negative for abdominal distention, abdominal pain, nausea and vomiting. Musculoskeletal:  Negative for joint swelling. Skin:  Negative for color change and wound. Neurological:  Negative for dizziness and syncope. Hematological:  Does not bruise/bleed easily. Psychiatric/Behavioral:  The patient is not nervous/anxious. All other systems reviewed and are negative. PAST MEDICAL HISTORY   No past medical history on file. SURGICAL HISTORY       Past Surgical History:   Procedure Laterality Date    BREAST LUMPECTOMY      BREAST LUMPECTOMY      BREAST LUMPECTOMY  08/2022     BREAST BIOPSY W LOC DEVICE 1ST LESION LEFT Left 07/18/2022     BREAST BIOPSY W LOC DEVICE 1ST LESION LEFT 7/18/2022 [facility] RAD MAMMO         CURRENT MEDICATIONS       Previous Medications    AMOXICILLIN-CLAVULANATE (AUGMENTIN) 875-125 MG PER TABLET    Take 1 tablet by mouth 2 times daily for 10 days    AZELASTINE (ASTELIN) 0.1 % NASAL SPRAY    ADMINISTER 2 SPRAYS INTO EACH NOSTRIL 2 TIMES DAILY.

## 2023-08-22 ENCOUNTER — OFFICE VISIT (OUTPATIENT)
Age: 78
End: 2023-08-22
Payer: MEDICARE

## 2023-08-22 VITALS
HEIGHT: 64 IN | RESPIRATION RATE: 16 BRPM | WEIGHT: 115 LBS | BODY MASS INDEX: 19.63 KG/M2 | SYSTOLIC BLOOD PRESSURE: 100 MMHG | OXYGEN SATURATION: 97 % | DIASTOLIC BLOOD PRESSURE: 66 MMHG | HEART RATE: 78 BPM | TEMPERATURE: 97.8 F

## 2023-08-22 DIAGNOSIS — E78.00 HYPERCHOLESTEROLEMIA: ICD-10-CM

## 2023-08-22 DIAGNOSIS — F32.5 DEPRESSION, MAJOR, IN REMISSION (HCC): ICD-10-CM

## 2023-08-22 DIAGNOSIS — C50.912 MALIGNANT NEOPLASM OF LEFT FEMALE BREAST, UNSPECIFIED ESTROGEN RECEPTOR STATUS, UNSPECIFIED SITE OF BREAST (HCC): ICD-10-CM

## 2023-08-22 DIAGNOSIS — Z00.00 MEDICARE ANNUAL WELLNESS VISIT, SUBSEQUENT: Primary | ICD-10-CM

## 2023-08-22 DIAGNOSIS — G47.00 INSOMNIA, UNSPECIFIED TYPE: ICD-10-CM

## 2023-08-22 DIAGNOSIS — F10.11 H/O ETOH ABUSE: ICD-10-CM

## 2023-08-22 PROCEDURE — G0439 PPPS, SUBSEQ VISIT: HCPCS | Performed by: INTERNAL MEDICINE

## 2023-08-22 PROCEDURE — 99214 OFFICE O/P EST MOD 30 MIN: CPT | Performed by: INTERNAL MEDICINE

## 2023-08-22 PROCEDURE — 1123F ACP DISCUSS/DSCN MKR DOCD: CPT | Performed by: INTERNAL MEDICINE

## 2023-08-22 SDOH — ECONOMIC STABILITY: FOOD INSECURITY: WITHIN THE PAST 12 MONTHS, THE FOOD YOU BOUGHT JUST DIDN'T LAST AND YOU DIDN'T HAVE MONEY TO GET MORE.: NEVER TRUE

## 2023-08-22 SDOH — ECONOMIC STABILITY: FOOD INSECURITY: WITHIN THE PAST 12 MONTHS, YOU WORRIED THAT YOUR FOOD WOULD RUN OUT BEFORE YOU GOT MONEY TO BUY MORE.: NEVER TRUE

## 2023-08-22 SDOH — ECONOMIC STABILITY: INCOME INSECURITY: HOW HARD IS IT FOR YOU TO PAY FOR THE VERY BASICS LIKE FOOD, HOUSING, MEDICAL CARE, AND HEATING?: NOT HARD AT ALL

## 2023-08-22 ASSESSMENT — PATIENT HEALTH QUESTIONNAIRE - PHQ9
SUM OF ALL RESPONSES TO PHQ QUESTIONS 1-9: 3
5. POOR APPETITE OR OVEREATING: 0
6. FEELING BAD ABOUT YOURSELF - OR THAT YOU ARE A FAILURE OR HAVE LET YOURSELF OR YOUR FAMILY DOWN: 0
1. LITTLE INTEREST OR PLEASURE IN DOING THINGS: 0
10. IF YOU CHECKED OFF ANY PROBLEMS, HOW DIFFICULT HAVE THESE PROBLEMS MADE IT FOR YOU TO DO YOUR WORK, TAKE CARE OF THINGS AT HOME, OR GET ALONG WITH OTHER PEOPLE: 0
9. THOUGHTS THAT YOU WOULD BE BETTER OFF DEAD, OR OF HURTING YOURSELF: 0
SUM OF ALL RESPONSES TO PHQ QUESTIONS 1-9: 3
4. FEELING TIRED OR HAVING LITTLE ENERGY: 0
3. TROUBLE FALLING OR STAYING ASLEEP: 3
SUM OF ALL RESPONSES TO PHQ QUESTIONS 1-9: 3
SUM OF ALL RESPONSES TO PHQ QUESTIONS 1-9: 3
7. TROUBLE CONCENTRATING ON THINGS, SUCH AS READING THE NEWSPAPER OR WATCHING TELEVISION: 0
2. FEELING DOWN, DEPRESSED OR HOPELESS: 0
8. MOVING OR SPEAKING SO SLOWLY THAT OTHER PEOPLE COULD HAVE NOTICED. OR THE OPPOSITE, BEING SO FIGETY OR RESTLESS THAT YOU HAVE BEEN MOVING AROUND A LOT MORE THAN USUAL: 0
SUM OF ALL RESPONSES TO PHQ9 QUESTIONS 1 & 2: 0

## 2023-08-22 ASSESSMENT — ENCOUNTER SYMPTOMS
BLOOD IN STOOL: 0
CONSTIPATION: 0
BACK PAIN: 0
SHORTNESS OF BREATH: 0
NAUSEA: 0
SORE THROAT: 0
ABDOMINAL PAIN: 0
DIARRHEA: 0
COUGH: 0

## 2023-08-22 ASSESSMENT — LIFESTYLE VARIABLES
HOW OFTEN DO YOU HAVE A DRINK CONTAINING ALCOHOL: NEVER
HOW MANY STANDARD DRINKS CONTAINING ALCOHOL DO YOU HAVE ON A TYPICAL DAY: PATIENT DOES NOT DRINK

## 2023-08-22 NOTE — PROGRESS NOTES
ordered. No Positive Risk Factors identified today. Review of Systems   Constitutional:  Negative for fever. HENT:  Negative for congestion and sore throat. Respiratory:  Negative for cough and shortness of breath. Cardiovascular:  Negative for chest pain and leg swelling. Gastrointestinal:  Negative for abdominal pain, blood in stool, constipation, diarrhea and nausea. Genitourinary:  Negative for dysuria, frequency and urgency. Musculoskeletal:  Negative for back pain and joint swelling. Skin:  Negative for rash. Neurological:  Negative for light-headedness and headaches. Psychiatric/Behavioral:  Negative for dysphoric mood. Physical Exam  Constitutional:       General: She is not in acute distress. Appearance: Normal appearance. HENT:      Head: Normocephalic and atraumatic. Right Ear: Tympanic membrane, ear canal and external ear normal.      Left Ear: Tympanic membrane, ear canal and external ear normal.      Nose: Nose normal.      Mouth/Throat:      Mouth: Mucous membranes are moist.      Pharynx: Oropharynx is clear. Eyes:      Extraocular Movements: Extraocular movements intact. Conjunctiva/sclera: Conjunctivae normal.      Pupils: Pupils are equal, round, and reactive to light. Neck:      Vascular: No carotid bruit. Cardiovascular:      Rate and Rhythm: Normal rate and regular rhythm. Pulses: Normal pulses. Heart sounds: Normal heart sounds. Pulmonary:      Effort: Pulmonary effort is normal.      Breath sounds: Normal breath sounds. Chest:      Comments: Breast exam: breasts appear normal, no suspicious masses, no skin or nipple changes or axillary nodes   Abdominal:      General: Bowel sounds are normal. There is no distension. Palpations: Abdomen is soft. There is no mass. Tenderness: There is no abdominal tenderness. Musculoskeletal:         General: No swelling.       Cervical back: Normal range of

## 2023-08-24 ENCOUNTER — PATIENT MESSAGE (OUTPATIENT)
Age: 78
End: 2023-08-24

## 2023-08-24 NOTE — TELEPHONE ENCOUNTER
Shawnee Frankel LPN 4/26/2003 9:36 PM EDT    Is a rx needed?   ----- Message -----  From: Savi Hernández  Sent: 8/24/2023 3:11 PM EDT  To: Ольга Concepcion  Clinical Staff  Subject: `tetanus booster     Please call that prescription in to [facility] on [address], [phone].

## 2023-08-25 DIAGNOSIS — R73.09 ELEVATED GLUCOSE: Primary | ICD-10-CM

## 2023-08-25 LAB
ALBUMIN SERPL-MCNC: 4.5 G/DL (ref 3.8–4.8)
ALBUMIN/GLOB SERPL: 2 {RATIO} (ref 1.2–2.2)
ALP SERPL-CCNC: 90 IU/L (ref 44–121)
ALT SERPL-CCNC: 11 IU/L (ref 0–32)
AST SERPL-CCNC: 23 IU/L (ref 0–40)
BILIRUB SERPL-MCNC: 0.3 MG/DL (ref 0–1.2)
BUN SERPL-MCNC: 16 MG/DL (ref 8–27)
BUN/CREAT SERPL: 17 (ref 12–28)
CALCIUM SERPL-MCNC: 9.1 MG/DL (ref 8.7–10.3)
CHLORIDE SERPL-SCNC: 104 MMOL/L (ref 96–106)
CHOLEST SERPL-MCNC: 219 MG/DL (ref 100–199)
CO2 SERPL-SCNC: 22 MMOL/L (ref 20–29)
CREAT SERPL-MCNC: 0.94 MG/DL (ref 0.57–1)
EGFRCR SERPLBLD CKD-EPI 2021: 61 ML/MIN/1.73
GLOBULIN SER CALC-MCNC: 2.3 G/DL (ref 1.5–4.5)
GLUCOSE SERPL-MCNC: 121 MG/DL (ref 70–99)
HDLC SERPL-MCNC: 77 MG/DL
LDLC SERPL CALC-MCNC: 127 MG/DL (ref 0–99)
POTASSIUM SERPL-SCNC: 4.3 MMOL/L (ref 3.5–5.2)
PROT SERPL-MCNC: 6.8 G/DL (ref 6–8.5)
SODIUM SERPL-SCNC: 143 MMOL/L (ref 134–144)
SPECIMEN STATUS REPORT: NORMAL
TRIGL SERPL-MCNC: 88 MG/DL (ref 0–149)
VLDLC SERPL CALC-MCNC: 15 MG/DL (ref 5–40)

## 2023-09-06 LAB — HBA1C MFR BLD: 5.7 % (ref 4.8–5.6)

## 2023-09-18 DIAGNOSIS — F32.5 DEPRESSION, MAJOR, IN REMISSION (HCC): Primary | ICD-10-CM

## 2023-09-21 ENCOUNTER — TELEPHONE (OUTPATIENT)
Age: 78
End: 2023-09-21

## 2023-09-21 NOTE — TELEPHONE ENCOUNTER
Call patient and let her know I do not prescribe that medication for her. Please find out who is prescribing it and why she is taking it and ask her to reach out to that practitioner. Update medication list if she is taking this medicine.

## 2023-09-21 NOTE — TELEPHONE ENCOUNTER
Medication Refill Request    Guera Witt is requesting a refill of the following medication(s):   gabapentin  Please send refill to:     [facility] [id number]  [address] [facility], [address] [place] [id number]  Phone: [phone] Fax: [phone]

## 2023-09-22 NOTE — TELEPHONE ENCOUNTER
Verified patient identity with two identifiers. Spoke with patient by phone. Patient states she took Gabapentin around 2020 for trigeminal neuralgia gum pain and now it has reoccurred, she called Dr. Kenya Curiel (neuro) who previously prescribed this and was told to reach out to PCP.

## 2023-10-02 ENCOUNTER — OFFICE VISIT (OUTPATIENT)
Age: 78
End: 2023-10-02
Payer: MEDICARE

## 2023-10-02 VITALS
TEMPERATURE: 98.1 F | RESPIRATION RATE: 16 BRPM | HEART RATE: 80 BPM | DIASTOLIC BLOOD PRESSURE: 66 MMHG | WEIGHT: 116.2 LBS | OXYGEN SATURATION: 94 % | SYSTOLIC BLOOD PRESSURE: 111 MMHG | BODY MASS INDEX: 19.95 KG/M2

## 2023-10-02 DIAGNOSIS — G50.0 TRIGEMINAL NEURALGIA OF RIGHT SIDE OF FACE: Primary | ICD-10-CM

## 2023-10-02 PROCEDURE — 1123F ACP DISCUSS/DSCN MKR DOCD: CPT | Performed by: STUDENT IN AN ORGANIZED HEALTH CARE EDUCATION/TRAINING PROGRAM

## 2023-10-02 PROCEDURE — 99213 OFFICE O/P EST LOW 20 MIN: CPT | Performed by: STUDENT IN AN ORGANIZED HEALTH CARE EDUCATION/TRAINING PROGRAM

## 2023-10-02 RX ORDER — CARBAMAZEPINE 100 MG/1
100 TABLET, EXTENDED RELEASE ORAL 2 TIMES DAILY
Qty: 60 TABLET | Refills: 3 | Status: SHIPPED | OUTPATIENT
Start: 2023-10-02

## 2023-10-02 ASSESSMENT — ENCOUNTER SYMPTOMS
CONSTIPATION: 0
BLOOD IN STOOL: 0
DIARRHEA: 0
VOMITING: 0
SHORTNESS OF BREATH: 0
NAUSEA: 0
ABDOMINAL PAIN: 0
COUGH: 0

## 2023-10-10 ENCOUNTER — PATIENT MESSAGE (OUTPATIENT)
Age: 78
End: 2023-10-10

## 2023-10-10 DIAGNOSIS — G50.0 TRIGEMINAL NEURALGIA OF RIGHT SIDE OF FACE: ICD-10-CM

## 2023-10-10 DIAGNOSIS — G50.0 TRIGEMINAL NEURALGIA OF RIGHT SIDE OF FACE: Primary | ICD-10-CM

## 2023-10-10 RX ORDER — GABAPENTIN 300 MG/1
300 CAPSULE ORAL DAILY
Qty: 30 CAPSULE | Refills: 0 | Status: SHIPPED | OUTPATIENT
Start: 2023-10-10 | End: 2023-10-11 | Stop reason: SDUPTHER

## 2023-10-10 NOTE — TELEPHONE ENCOUNTER
Kiana Cavazos LPN 40/27/7989 9:43 PM EDT      ----- Message -----  From: Yenny South  Sent: 10/10/2023 2:44 PM EDT  To: Clotilde Nageotte End  Clinical Staff  Subject: Trigeminal neuralgia     Carbamazepine is not working. Can you please call in a prescription for gabapentin. Also gabapentin does not have the side effect of causing heart issues which run in my family. Thanks.  Kee Dinh

## 2023-10-10 NOTE — TELEPHONE ENCOUNTER
Will move to second-line therapy for trigeminal neuralgia and start gabapentin, as patient has had no improvement on first-line carbemazepine and does not want to continue. Will also have patient follow up with Dr Thee Villanueva whom she has seen previously for treatment of trigeminal neuralgia.

## 2023-10-11 RX ORDER — GABAPENTIN 300 MG/1
300 CAPSULE ORAL DAILY
Qty: 30 CAPSULE | Refills: 0 | Status: SHIPPED | OUTPATIENT
Start: 2023-10-11 | End: 2023-11-10

## 2023-10-11 NOTE — TELEPHONE ENCOUNTER
From: Chelsea Player  To: Dr. Anne-Marie Chaney: 10/10/2023 7:37 PM EDT  Subject: Gabapentin    The pharmacy could not fill the prescription. They have to have the original. I am headed to [place], [place] tomorrow. Can you please call the prescription in to a [facility] there near our hotel. Phone number [phone].  Kevin Jain

## 2023-10-11 NOTE — TELEPHONE ENCOUNTER
I'll re-send to the updated pharmacy. Please call and cancel the rx that I sent to her pharmacy on file.

## 2023-11-02 DIAGNOSIS — G50.0 TRIGEMINAL NEURALGIA OF RIGHT SIDE OF FACE: ICD-10-CM

## 2023-11-03 RX ORDER — GABAPENTIN 300 MG/1
600 CAPSULE ORAL NIGHTLY
Qty: 60 CAPSULE | Refills: 0 | Status: SHIPPED | OUTPATIENT
Start: 2023-11-03 | End: 2023-11-14

## 2023-11-03 NOTE — TELEPHONE ENCOUNTER
Patient reports that she is taking 2 tablets at night.    Made aware that she needs to follow up with pcp or nerology for future refills

## 2023-11-03 NOTE — TELEPHONE ENCOUNTER
Mina Clonts, DO  to Me        11/3/23 11:50 AM  Will you please call this patient and ask her if she is still taking 300mg at night or if she is taking the increased dose of 600mg? I am okay to refill once, but she also should make an appointment to see Dr. Milan Crawford and/or neurology for further [facility] of her trigeminal neuralgia before she gets any more refills.

## 2023-11-06 ENCOUNTER — TELEPHONE (OUTPATIENT)
Age: 78
End: 2023-11-06

## 2023-11-06 DIAGNOSIS — G47.00 INSOMNIA, UNSPECIFIED TYPE: ICD-10-CM

## 2023-11-06 DIAGNOSIS — E78.00 HYPERCHOLESTEROLEMIA: Primary | ICD-10-CM

## 2023-11-06 RX ORDER — TRAZODONE HYDROCHLORIDE 50 MG/1
TABLET ORAL
Qty: 135 TABLET | Refills: 1 | Status: SHIPPED | OUTPATIENT
Start: 2023-11-06

## 2023-11-06 RX ORDER — EZETIMIBE 10 MG/1
10 TABLET ORAL DAILY
Qty: 90 TABLET | Refills: 3 | Status: SHIPPED | OUTPATIENT
Start: 2023-11-06

## 2023-11-06 NOTE — TELEPHONE ENCOUNTER
Medication Refill Request    Barbara George is requesting a refill of the following medication(s):   Bethamethasone [place] 0.1 cream  Please send refill to:   [facility]/pharmacy #[id number] - [place] - [address] -  [phone] - F [phone]

## 2023-11-06 NOTE — TELEPHONE ENCOUNTER
Mammogram ordered. Pt states she will call to schedule Pt wants to know if okay to prescribe 1 month off HRT . Please advise. Medication Refill Request    Barbara George is requesting a refill of the following medication(s):   Ezetimibe 10mg tablet (new request)  Please send refill to:    [facility]/pharmacy #[id number] - [place], [place] - [address] -  [phone] - F [phone]

## 2023-11-06 NOTE — TELEPHONE ENCOUNTER
Medication Refill Request    Barbara George is requesting a refill of the following medication(s):   Trazodone 50mg tablet  Please send refill to:    [facility]/pharmacy #[id number] - [place] - [address] -  [phone] - F [phone]

## 2023-11-07 ENCOUNTER — TELEPHONE (OUTPATIENT)
Age: 78
End: 2023-11-07

## 2023-11-07 NOTE — TELEPHONE ENCOUNTER
Medication Refill Request    Nanbebeto Chelsi is requesting a refill of the following medication(s):   Betamethasone VA 0.1%cream  Please send refill to:   [facility]/pharmacy [address] [place] [address], [address] [place] [address] [phone] - F [phone]

## 2023-11-08 DIAGNOSIS — G50.0 TRIGEMINAL NEURALGIA OF RIGHT SIDE OF FACE: ICD-10-CM

## 2023-11-08 NOTE — TELEPHONE ENCOUNTER
Reason for call:  TC from pt. Pt id verified. Pt states she is returning a nurse call.     Is this a new problem: No    Date of last appointment:  10/2/2023     Can we respond via [employer]: No    Best call back number: [phone]

## 2023-11-08 NOTE — TELEPHONE ENCOUNTER
Verified patient identity with two identifiers. Spoke with patient by phone. Patient was returning a call made for clarification for MD Barker as to 600mg gabapentin increased during visit. Patient had not responded letting MD Barker know if 600mg was helping.  Patient states 600mg is not enough but with doubling her dose of 300mg for trial, has been out of gabapentin for the 2nd day now. Patient states 600mg is not helping symptoms.  States does have an appt with neurology but not until end of January. Please advise.

## 2023-11-08 NOTE — TELEPHONE ENCOUNTER
Medication Refill Request    Barbara George is requesting a refill of the following medication(s):   gabapentin (NEURONTIN) 300 MG capsule                Please send refill to:   [facility]/pharmacy #[id number] - [place] - [address] -  [phone] - F [phone]

## 2023-11-09 RX ORDER — GABAPENTIN 300 MG/1
600 CAPSULE ORAL NIGHTLY
Qty: 60 CAPSULE | Refills: 0 | OUTPATIENT
Start: 2023-11-09 | End: 2023-12-09

## 2023-11-10 ENCOUNTER — TELEPHONE (OUTPATIENT)
Age: 78
End: 2023-11-10

## 2023-11-10 DIAGNOSIS — G50.0 TRIGEMINAL NEURALGIA OF RIGHT SIDE OF FACE: ICD-10-CM

## 2023-11-10 RX ORDER — GABAPENTIN 300 MG/1
600 CAPSULE ORAL NIGHTLY
Qty: 60 CAPSULE | Refills: 0 | Status: CANCELLED | OUTPATIENT
Start: 2023-11-10 | End: 2023-12-10

## 2023-11-14 ENCOUNTER — OFFICE VISIT (OUTPATIENT)
Age: 78
End: 2023-11-14
Payer: MEDICARE

## 2023-11-14 VITALS
BODY MASS INDEX: 19.63 KG/M2 | TEMPERATURE: 97.3 F | HEIGHT: 64 IN | WEIGHT: 115 LBS | RESPIRATION RATE: 16 BRPM | HEART RATE: 68 BPM | OXYGEN SATURATION: 96 % | DIASTOLIC BLOOD PRESSURE: 62 MMHG | SYSTOLIC BLOOD PRESSURE: 99 MMHG

## 2023-11-14 DIAGNOSIS — G50.0 TRIGEMINAL NEURALGIA OF RIGHT SIDE OF FACE: Primary | ICD-10-CM

## 2023-11-14 PROCEDURE — 99214 OFFICE O/P EST MOD 30 MIN: CPT | Performed by: STUDENT IN AN ORGANIZED HEALTH CARE EDUCATION/TRAINING PROGRAM

## 2023-11-14 PROCEDURE — 1123F ACP DISCUSS/DSCN MKR DOCD: CPT | Performed by: STUDENT IN AN ORGANIZED HEALTH CARE EDUCATION/TRAINING PROGRAM

## 2023-11-14 RX ORDER — GABAPENTIN 300 MG/1
300 CAPSULE ORAL NIGHTLY
Qty: 30 CAPSULE | Refills: 2 | Status: SHIPPED | OUTPATIENT
Start: 2023-11-14 | End: 2024-02-12

## 2023-11-14 RX ORDER — TOPIRAMATE 25 MG/1
25 TABLET ORAL DAILY
Qty: 30 TABLET | Refills: 2 | Status: SHIPPED | OUTPATIENT
Start: 2023-11-14 | End: 2024-02-12

## 2023-11-14 NOTE — PROGRESS NOTES
risk (8/22/2023)    PHQ-2     PHQ-2 Score: 3   Housing Stability: Unknown (8/22/2023)    Housing Stability Vital Sign     Unable to Pay for Housing in the Last Year: Not on file     Number of Places Lived in the Last Year: Not on file     Unstable Housing in the Last Year: No   Interpersonal Safety: Not on file   Utilities: Not on file

## 2023-11-14 NOTE — PROGRESS NOTES
for a few weeks and if she is still having pain she will let me know and we will go up to 50mg on the topamax. She has an appointment with Dr. Brendon Jc in January for further evaluation as well. No results found for any visits on 11/14/23. Return if symptoms worsen or fail to improve.        Eddie Phillips, DO

## 2023-11-15 DIAGNOSIS — G50.0 TRIGEMINAL NEURALGIA OF RIGHT SIDE OF FACE: ICD-10-CM

## 2023-11-16 RX ORDER — CARBAMAZEPINE 100 MG/1
100 TABLET, EXTENDED RELEASE ORAL 2 TIMES DAILY
Qty: 180 TABLET | Refills: 1 | OUTPATIENT
Start: 2023-11-16

## 2023-11-16 RX ORDER — TOPIRAMATE 25 MG/1
25 TABLET ORAL DAILY
Qty: 90 TABLET | OUTPATIENT
Start: 2023-11-16

## 2023-11-27 ENCOUNTER — PATIENT MESSAGE (OUTPATIENT)
Age: 78
End: 2023-11-27

## 2023-11-27 DIAGNOSIS — R25.2 LEG CRAMPS: Primary | ICD-10-CM

## 2023-11-29 NOTE — TELEPHONE ENCOUNTER
Francisco Huber, GIULIANA 89/67/2426 82:81 AM EST    Do you want order labs?  ----- Message -----  From: Elena Johnson  Sent: 11/27/2023 6:35 PM EST  To: Lalito Concepcion  Clinical Staff  Subject: Ankle, Leg Cramps, Pain & tightness in right*    I reached out to Ortho PA at [facility] and this is what she suggested    \"Ankle and leg cramps are often an issue with your electrolytes, dehydration, and sometimes medication induced. Sometimes the low back can also cause some cramping. I would see primary care first for lab work/evaluation, possible medication. \"    Could you please order the necessary lab work.  Thanks, Leila Mckeon

## 2023-11-30 ENCOUNTER — TELEPHONE (OUTPATIENT)
Age: 78
End: 2023-11-30

## 2023-12-01 LAB
BUN SERPL-MCNC: 21 MG/DL (ref 8–27)
BUN/CREAT SERPL: 23 (ref 12–28)
CALCIUM SERPL-MCNC: 9.8 MG/DL (ref 8.7–10.3)
CHLORIDE SERPL-SCNC: 104 MMOL/L (ref 96–106)
CO2 SERPL-SCNC: 26 MMOL/L (ref 20–29)
CREAT SERPL-MCNC: 0.92 MG/DL (ref 0.57–1)
EGFRCR SERPLBLD CKD-EPI 2021: 63 ML/MIN/1.73
GLUCOSE SERPL-MCNC: 96 MG/DL (ref 70–99)
MAGNESIUM SERPL-MCNC: 2.2 MG/DL (ref 1.6–2.3)
POTASSIUM SERPL-SCNC: 4.4 MMOL/L (ref 3.5–5.2)
SODIUM SERPL-SCNC: 143 MMOL/L (ref 134–144)

## 2024-01-15 ENCOUNTER — PATIENT MESSAGE (OUTPATIENT)
Age: 79
End: 2024-01-15

## 2024-01-16 NOTE — TELEPHONE ENCOUNTER
Terrance, Theresa, LPN 1/15/2024 10:44 AM EST      ----- Message -----  From: Barbara George  Sent: 1/15/2024  10:14 AM EST  To: Dave Concepcion  Clinical Staff  Subject: Gabapentin                                       My pain has never improved and is getting worse. Please can I increase the Gabapentin to twice a day and see what happens. I have friends my age taking 5-6 a day. Thanks, Barbara

## 2024-01-16 NOTE — TELEPHONE ENCOUNTER
Call patient and see if we can get her in sometime over the next 2 weeks.  30-minute appointment if possible but will see in 15-minute if needed.

## 2024-01-16 NOTE — TELEPHONE ENCOUNTER
In person 30 min appt scheduled on 01/18/23 with BARRETT.  Kept February appt in case additional f/u is needed.    FYI sent to BARRETT.

## 2024-01-18 ENCOUNTER — OFFICE VISIT (OUTPATIENT)
Age: 79
End: 2024-01-18
Payer: MEDICARE

## 2024-01-18 VITALS
TEMPERATURE: 97.8 F | OXYGEN SATURATION: 97 % | SYSTOLIC BLOOD PRESSURE: 113 MMHG | HEIGHT: 64 IN | RESPIRATION RATE: 16 BRPM | HEART RATE: 65 BPM | DIASTOLIC BLOOD PRESSURE: 67 MMHG | WEIGHT: 115.8 LBS | BODY MASS INDEX: 19.77 KG/M2

## 2024-01-18 DIAGNOSIS — G50.0 TRIGEMINAL NEURALGIA OF RIGHT SIDE OF FACE: Primary | ICD-10-CM

## 2024-01-18 PROCEDURE — 1123F ACP DISCUSS/DSCN MKR DOCD: CPT | Performed by: INTERNAL MEDICINE

## 2024-01-18 PROCEDURE — 99213 OFFICE O/P EST LOW 20 MIN: CPT | Performed by: INTERNAL MEDICINE

## 2024-01-18 RX ORDER — MONTELUKAST SODIUM 4 MG/1
TABLET, CHEWABLE ORAL
COMMUNITY

## 2024-01-18 RX ORDER — GABAPENTIN 100 MG/1
CAPSULE ORAL
Qty: 150 CAPSULE | Refills: 0 | Status: SHIPPED | OUTPATIENT
Start: 2024-01-18 | End: 2024-02-18

## 2024-01-18 RX ORDER — CARBAMAZEPINE 100 MG/1
100 TABLET, EXTENDED RELEASE ORAL 2 TIMES DAILY
COMMUNITY
Start: 2023-10-02 | End: 2024-01-18

## 2024-01-18 ASSESSMENT — PATIENT HEALTH QUESTIONNAIRE - PHQ9
4. FEELING TIRED OR HAVING LITTLE ENERGY: 0
10. IF YOU CHECKED OFF ANY PROBLEMS, HOW DIFFICULT HAVE THESE PROBLEMS MADE IT FOR YOU TO DO YOUR WORK, TAKE CARE OF THINGS AT HOME, OR GET ALONG WITH OTHER PEOPLE: 0
SUM OF ALL RESPONSES TO PHQ QUESTIONS 1-9: 1
8. MOVING OR SPEAKING SO SLOWLY THAT OTHER PEOPLE COULD HAVE NOTICED. OR THE OPPOSITE, BEING SO FIGETY OR RESTLESS THAT YOU HAVE BEEN MOVING AROUND A LOT MORE THAN USUAL: 0
5. POOR APPETITE OR OVEREATING: 0
2. FEELING DOWN, DEPRESSED OR HOPELESS: 0
1. LITTLE INTEREST OR PLEASURE IN DOING THINGS: 0
SUM OF ALL RESPONSES TO PHQ QUESTIONS 1-9: 1
9. THOUGHTS THAT YOU WOULD BE BETTER OFF DEAD, OR OF HURTING YOURSELF: 0
3. TROUBLE FALLING OR STAYING ASLEEP: 1
7. TROUBLE CONCENTRATING ON THINGS, SUCH AS READING THE NEWSPAPER OR WATCHING TELEVISION: 0
SUM OF ALL RESPONSES TO PHQ QUESTIONS 1-9: 1
SUM OF ALL RESPONSES TO PHQ9 QUESTIONS 1 & 2: 0
SUM OF ALL RESPONSES TO PHQ QUESTIONS 1-9: 1

## 2024-01-18 ASSESSMENT — ENCOUNTER SYMPTOMS
ABDOMINAL PAIN: 0
SHORTNESS OF BREATH: 0

## 2024-01-18 NOTE — PROGRESS NOTES
Barbara George is a 78 y.o. female who was seen in clinic today (1/18/2024).      Assessment & Plan:   Below is the assessment and plan developed based on review of pertinent history, physical exam, labs, studies, and medications.  1. Trigeminal neuralgia of right side of face  Assessment & Plan:  Pain not controlled and will increase to gabapentin 100 mg every morning and at noon and 300 mg nightly.  Reviewed potential side effects including sedation she will contact me if she is having any problems.  Keep upcoming appointment with her neurologist Dr. Hunt for consultation.  Follow-up 4 weeks.  Orders:  -     gabapentin (NEURONTIN) 100 MG capsule; 1 tablet in am and 1 tablet at noon and 3 tablets before bed., Disp-150 capsule, R-0Normal     Return if symptoms worsen or fail to improve, for keep upcoming appt in feb. .   Subjective/Objective:   Barbara was seen today for Discuss Medications  Reviewed notes from Dr. Barker.    Right facial trigeminal neuralgia flare: Pain not controlled.  She is sleeping throughout the night with the gabapentin 300 mg nightly but daytime continues to have on and off right facial pain.  She does not feel the Topamax has made any difference in her pain and she did not want to take carbamazepine.  Denies any side effects on the nighttime gabapentin.  She would like to increase the dose.   She was seen by  and neurology in the past who had her on low-dose gabapentin for control of trigeminal neuralgia and she been off medication for about a year when she had a recurrence of the symptoms.  They are classic for her previous trigeminal neuralgia symptoms.   last refill gabapentin 1/9/24. Right upper gum area.   Current Outpatient Medications   Medication Sig Dispense Refill    colestipol (COLESTID) 1 g tablet PLEASE SEE ATTACHED FOR DETAILED DIRECTIONS      topiramate (TOPAMAX) 25 MG tablet Take 1 tablet by mouth daily 30 tablet 2    gabapentin (NEURONTIN) 300 MG capsule Take 1

## 2024-01-18 NOTE — ASSESSMENT & PLAN NOTE
Pain not controlled and will increase to gabapentin 100 mg every morning and at noon and 300 mg nightly.  Reviewed potential side effects including sedation she will contact me if she is having any problems.  Keep upcoming appointment with her neurologist Dr. Hunt for consultation.  Follow-up 4 weeks.

## 2024-01-18 NOTE — PATIENT INSTRUCTIONS
A Healthy Lifestyle: Care Instructions  A healthy lifestyle can help you feel good, have more energy, and stay at a weight that's healthy for you. You can share a healthy lifestyle with your friends and family. And you can do it on your own.    Eat meals with your friends or family. You could try cooking together.   Plan activities with other people. Go for a walk with a friend, try a free online fitness class, or join a sports league.     Eat a variety of healthy foods. These include fruits, vegetables, whole grains, low-fat dairy, and lean protein.   Choose healthy portions of food. You can use the Nutrition Facts label on food packages as a guide.     Eat more fruits and vegetables. You could add vegetables to sandwiches or add fruit to cereal.   Drink water when you are thirsty. Limit soda, juice, and sports drinks.     Try to exercise most days. Aim for at least 2½ hours of exercise each week.   Keep moving. Work in the garden or take your dog on a walk. Use the stairs instead of the elevator.     If you use tobacco or nicotine, try to quit. Ask your doctor about programs and medicines to help you quit.   Limit alcohol. Men should have no more than 2 drinks a day. Women should have no more than 1. For some people, no alcohol is the best choice.   Follow-up care is a key part of your treatment and safety. Be sure to make and go to all appointments, and call your doctor if you are having problems. It's also a good idea to know your test results and keep a list of the medicines you take.  Where can you learn more?  Go to https://www.[employer].net/patientEd and enter U807 to learn more about \"A Healthy Lifestyle: Care Instructions.\"  Current as of: November 14, 2022               Content Version: 13.7  © 4061-9553 Healthwise, Incorporated.   Care instructions adapted under license by [employer]. If you have questions about a medical condition or this instruction, always ask your healthcare professional.

## 2024-02-12 DIAGNOSIS — G50.0 TRIGEMINAL NEURALGIA OF RIGHT SIDE OF FACE: ICD-10-CM

## 2024-02-13 RX ORDER — GABAPENTIN 100 MG/1
CAPSULE ORAL
Qty: 150 CAPSULE | Refills: 0 | OUTPATIENT
Start: 2024-02-13 | End: 2024-03-14

## 2024-02-14 ENCOUNTER — TELEPHONE (OUTPATIENT)
Age: 79
End: 2024-02-14

## 2024-02-14 DIAGNOSIS — G50.0 TRIGEMINAL NEURALGIA OF RIGHT SIDE OF FACE: ICD-10-CM

## 2024-02-14 RX ORDER — GABAPENTIN 100 MG/1
CAPSULE ORAL
Qty: 150 CAPSULE | Refills: 0 | Status: SHIPPED | OUTPATIENT
Start: 2024-02-17 | End: 2024-03-16

## 2024-02-14 NOTE — TELEPHONE ENCOUNTER
Reason for call:  TC from pt. Pt id verified. Pt calling to get status on her refill request for her gabapentin (NEURONTIN) 100 MG capsule.     Is this a new problem: Yes    Date of last appointment:  1/18/2024     Can we respond via [employer]: No    Best call back number: [phone]

## 2024-02-28 ENCOUNTER — OFFICE VISIT (OUTPATIENT)
Age: 79
End: 2024-02-28
Payer: MEDICARE

## 2024-02-28 VITALS
TEMPERATURE: 97.8 F | RESPIRATION RATE: 16 BRPM | SYSTOLIC BLOOD PRESSURE: 122 MMHG | HEART RATE: 59 BPM | HEIGHT: 64 IN | OXYGEN SATURATION: 98 % | WEIGHT: 114.2 LBS | BODY MASS INDEX: 19.5 KG/M2 | DIASTOLIC BLOOD PRESSURE: 74 MMHG

## 2024-02-28 DIAGNOSIS — E78.00 HYPERCHOLESTEROLEMIA: ICD-10-CM

## 2024-02-28 DIAGNOSIS — G47.00 INSOMNIA, UNSPECIFIED TYPE: ICD-10-CM

## 2024-02-28 DIAGNOSIS — C50.912 MALIGNANT NEOPLASM OF LEFT FEMALE BREAST, UNSPECIFIED ESTROGEN RECEPTOR STATUS, UNSPECIFIED SITE OF BREAST (HCC): ICD-10-CM

## 2024-02-28 DIAGNOSIS — G50.0 TRIGEMINAL NEURALGIA OF RIGHT SIDE OF FACE: Primary | ICD-10-CM

## 2024-02-28 DIAGNOSIS — F32.5 DEPRESSION, MAJOR, IN REMISSION (HCC): ICD-10-CM

## 2024-02-28 PROBLEM — E53.8 B12 DEFICIENCY: Status: RESOLVED | Noted: 2022-08-04 | Resolved: 2024-02-28

## 2024-02-28 PROBLEM — N95.1 VAGINAL DRYNESS, MENOPAUSAL: Status: RESOLVED | Noted: 2019-12-05 | Resolved: 2024-02-28

## 2024-02-28 PROCEDURE — 99214 OFFICE O/P EST MOD 30 MIN: CPT | Performed by: INTERNAL MEDICINE

## 2024-02-28 PROCEDURE — 1123F ACP DISCUSS/DSCN MKR DOCD: CPT | Performed by: INTERNAL MEDICINE

## 2024-02-28 RX ORDER — TRAZODONE HYDROCHLORIDE 50 MG/1
TABLET ORAL
Qty: 135 TABLET | Refills: 1 | Status: SHIPPED | OUTPATIENT
Start: 2024-02-28

## 2024-02-28 RX ORDER — GABAPENTIN 300 MG/1
300 CAPSULE ORAL 3 TIMES DAILY
Qty: 90 CAPSULE | Refills: 2 | Status: SHIPPED | OUTPATIENT
Start: 2024-02-28 | End: 2024-05-28

## 2024-02-28 ASSESSMENT — ENCOUNTER SYMPTOMS
SHORTNESS OF BREATH: 0
ABDOMINAL PAIN: 0

## 2024-02-28 ASSESSMENT — PATIENT HEALTH QUESTIONNAIRE - PHQ9
SUM OF ALL RESPONSES TO PHQ QUESTIONS 1-9: 0
SUM OF ALL RESPONSES TO PHQ QUESTIONS 1-9: 0
1. LITTLE INTEREST OR PLEASURE IN DOING THINGS: 0
SUM OF ALL RESPONSES TO PHQ9 QUESTIONS 1 & 2: 0
SUM OF ALL RESPONSES TO PHQ QUESTIONS 1-9: 0
2. FEELING DOWN, DEPRESSED OR HOPELESS: 0
SUM OF ALL RESPONSES TO PHQ QUESTIONS 1-9: 0

## 2024-02-28 NOTE — ASSESSMENT & PLAN NOTE
She has had gamma knife radiation treatment by her neurosurgeon end of January.  She is continue to have breakthrough pain symptoms at times with the gabapentin 300 mg 2-3 times a day.  Sometimes she forgets the third dose.   is reviewed we will continue with current medication.  Follow-up in 3 months and will taper gabapentin as pain improves.

## 2024-02-28 NOTE — PATIENT INSTRUCTIONS
A Healthy Lifestyle: Care Instructions  A healthy lifestyle can help you feel good, have more energy, and stay at a weight that's healthy for you. You can share a healthy lifestyle with your friends and family. And you can do it on your own.    Eat meals with your friends or family. You could try cooking together.   Plan activities with other people. Go for a walk with a friend, try a free online fitness class, or join a sports league.     Eat a variety of healthy foods. These include fruits, vegetables, whole grains, low-fat dairy, and lean protein.   Choose healthy portions of food. You can use the Nutrition Facts label on food packages as a guide.     Eat more fruits and vegetables. You could add vegetables to sandwiches or add fruit to cereal.   Drink water when you are thirsty. Limit soda, juice, and sports drinks.     Try to exercise most days. Aim for at least 2½ hours of exercise each week.   Keep moving. Work in the garden or take your dog on a walk. Use the stairs instead of the elevator.     If you use tobacco or nicotine, try to quit. Ask your doctor about programs and medicines to help you quit.   Limit alcohol. Men should have no more than 2 drinks a day. Women should have no more than 1. For some people, no alcohol is the best choice.   Follow-up care is a key part of your treatment and safety. Be sure to make and go to all appointments, and call your doctor if you are having problems. It's also a good idea to know your test results and keep a list of the medicines you take.  Where can you learn more?  Go to https://www.[employer].net/patientEd and enter U807 to learn more about \"A Healthy Lifestyle: Care Instructions.\"  Current as of: November 14, 2022               Content Version: 13.7  © 9645-9935 Healthwise, Incorporated.   Care instructions adapted under license by [employer]. If you have questions about a medical condition or this instruction, always ask your healthcare professional.

## 2024-02-28 NOTE — ASSESSMENT & PLAN NOTE
Reviewed that gabapentin can cause sedation and she may no longer need the trazodone.  Patient is adamant the gabapentin does not cause any sedation and she does continue to take the trazodone for sleep.  Will continue with trazodone med refill sent.

## 2024-02-28 NOTE — PROGRESS NOTES
Barbara George is a 78 y.o. female who was seen in clinic today (2/28/2024).      Assessment & Plan:   Below is the assessment and plan developed based on review of pertinent history, physical exam, labs, studies, and medications.  1. Trigeminal neuralgia of right side of face  Assessment & Plan:   She has had gamma knife radiation treatment by her neurosurgeon end of January.  She is continue to have breakthrough pain symptoms at times with the gabapentin 300 mg 2-3 times a day.  Sometimes she forgets the third dose.   is reviewed we will continue with current medication.  Follow-up in 3 months and will taper gabapentin as pain improves.  Orders:  -     gabapentin (NEURONTIN) 300 MG capsule; Take 1 capsule by mouth 3 times daily for 90 days. Max Daily Amount: 900 mg, Disp-90 capsule, R-2Normal  2. Depression, major, in remission (HCC)  Assessment & Plan:   Well-controlled, continue current medications  Orders:  -     sertraline (ZOLOFT) 50 MG tablet; Take 1 tablet by mouth daily, Disp-90 tablet, R-1Normal  3. Hypercholesterolemia  Assessment & Plan:    stable continue zetia  4. Malignant neoplasm of left female breast, unspecified estrogen receptor status, unspecified site of breast (HCC)  Assessment & Plan:   Monitored by specialist- no acute findings meriting change in the plan  5. Insomnia, unspecified type  Assessment & Plan:   Reviewed that gabapentin can cause sedation and she may no longer need the trazodone.  Patient is adamant the gabapentin does not cause any sedation and she does continue to take the trazodone for sleep.  Will continue with trazodone med refill sent.  Orders:  -     traZODone (DESYREL) 50 MG tablet; TAKE 1 AND 1/2 TABLET BY MOUTH BY MOUTH NIGHTLY, Disp-135 tablet, R-1Normal     Return in about 3 months (around 5/28/2024) for MWV.   Subjective/Objective:   Barbara was seen today for Follow-up  Trigimnial neuralgia and saw DR Sims and had gamma knife radiation on 1/30/24.    follow up

## 2024-05-19 ENCOUNTER — PATIENT MESSAGE (OUTPATIENT)
Age: 79
End: 2024-05-19

## 2024-05-19 DIAGNOSIS — E78.00 HYPERCHOLESTEROLEMIA: Primary | ICD-10-CM

## 2024-05-19 DIAGNOSIS — R73.09 ELEVATED GLUCOSE: ICD-10-CM

## 2024-05-20 NOTE — TELEPHONE ENCOUNTER
From: Barbara George  To: Dr. Charmaine Pretty  Sent: 5/19/2024 8:40 AM EDT  Subject: Lab Work    Do I need blood work for my upcoming visit?

## 2024-05-22 ENCOUNTER — TELEPHONE (OUTPATIENT)
Age: 79
End: 2024-05-22

## 2024-05-22 NOTE — TELEPHONE ENCOUNTER
----- Message from Oksana Brown sent at 5/22/2024  8:13 AM EDT -----  Subject: Referral Request    Reason for referral request? Labs - Routine   Provider patient wants to be referred to(if known):     Provider Phone Number(if known):    Additional Information for Provider? Patient not sure if she is for for   labs or not, please check and let her know she has appointment with   provider on 5/28/24  ---------------------------------------------------------------------------  --------------  CALL BACK INFO    [phone]; OK to leave message on voicemail  ---------------------------------------------------------------------------  --------------

## 2024-05-24 DIAGNOSIS — R73.09 ELEVATED GLUCOSE: ICD-10-CM

## 2024-05-24 DIAGNOSIS — E78.00 HYPERCHOLESTEROLEMIA: ICD-10-CM

## 2024-05-28 ENCOUNTER — OFFICE VISIT (OUTPATIENT)
Age: 79
End: 2024-05-28
Payer: MEDICARE

## 2024-05-28 VITALS
BODY MASS INDEX: 19.19 KG/M2 | SYSTOLIC BLOOD PRESSURE: 119 MMHG | WEIGHT: 112.4 LBS | HEIGHT: 64 IN | HEART RATE: 68 BPM | DIASTOLIC BLOOD PRESSURE: 68 MMHG | TEMPERATURE: 97.5 F | RESPIRATION RATE: 16 BRPM | OXYGEN SATURATION: 96 %

## 2024-05-28 DIAGNOSIS — F32.5 DEPRESSION, MAJOR, IN REMISSION (HCC): ICD-10-CM

## 2024-05-28 DIAGNOSIS — G47.00 INSOMNIA, UNSPECIFIED TYPE: Primary | ICD-10-CM

## 2024-05-28 PROBLEM — G50.0 TRIGEMINAL NEURALGIA OF RIGHT SIDE OF FACE: Status: RESOLVED | Noted: 2024-01-18 | Resolved: 2024-05-28

## 2024-05-28 PROCEDURE — 1123F ACP DISCUSS/DSCN MKR DOCD: CPT | Performed by: INTERNAL MEDICINE

## 2024-05-28 PROCEDURE — 99214 OFFICE O/P EST MOD 30 MIN: CPT | Performed by: INTERNAL MEDICINE

## 2024-05-28 RX ORDER — IPRATROPIUM BROMIDE 42 UG/1
SPRAY, METERED NASAL
COMMUNITY
Start: 2024-04-25

## 2024-05-28 ASSESSMENT — PATIENT HEALTH QUESTIONNAIRE - PHQ9
SUM OF ALL RESPONSES TO PHQ QUESTIONS 1-9: 0
1. LITTLE INTEREST OR PLEASURE IN DOING THINGS: NOT AT ALL
2. FEELING DOWN, DEPRESSED OR HOPELESS: NOT AT ALL
SUM OF ALL RESPONSES TO PHQ QUESTIONS 1-9: 0
SUM OF ALL RESPONSES TO PHQ QUESTIONS 1-9: 0
SUM OF ALL RESPONSES TO PHQ9 QUESTIONS 1 & 2: 0
SUM OF ALL RESPONSES TO PHQ QUESTIONS 1-9: 0

## 2024-05-28 NOTE — PATIENT INSTRUCTIONS

## 2024-05-28 NOTE — PROGRESS NOTES
Barbara George is a 79 y.o. female who was seen in clinic today (5/28/2024).      Assessment & Plan:   Below is the assessment and plan developed based on review of pertinent history, physical exam, labs, studies, and medications.  1. Insomnia, unspecified type  Assessment & Plan:   At goal, continue trazodone.  2. Depression, major, in remission (HCC)  Assessment & Plan:   Well-controlled, continue current medications     Return in about 6 months (around 11/28/2024) for MWV.   Subjective/Objective:   Barbara was seen today for Follow-up   follow up active medical problems and medication management.   Patient Active Problem List   Diagnosis    Adenomatous polyp of cecum    H/O ETOH abuse    Allergic rhinitis    Osteopenia    Hypercholesterolemia    Depression, major, in remission (HCC)    Malignant neoplasm of left female breast, unspecified estrogen receptor status, unspecified site of breast (HCC)    Insomnia, unspecified type    Trigeminal neuralgia of right side of face   She gamma knife treatment and trigeminal neuralgia resolved. Denies pain and off of gabapentin.   Depression and sleep controlled on current meds.   Taking medication with no side effects.   Exercise: regular Diet eating vegan gluten free diet to help diarrhea and has had full workup with GI.       Current Outpatient Medications   Medication Sig Dispense Refill    ipratropium (ATROVENT) 0.06 % nasal spray ADMINISTER 2 SPRAYS INTO EACH NOSTRIL 3 TIMES A DAY.      sertraline (ZOLOFT) 50 MG tablet Take 1 tablet by mouth daily 90 tablet 1    traZODone (DESYREL) 50 MG tablet TAKE 1 AND 1/2 TABLET BY MOUTH BY MOUTH NIGHTLY 135 tablet 1    colestipol (COLESTID) 1 g tablet PLEASE SEE ATTACHED FOR DETAILED DIRECTIONS      ezetimibe (ZETIA) 10 MG tablet Take 1 tablet by mouth daily 90 tablet 3    CALCIUM CARBONATE-VITAMIN D PO Take 300 mg by mouth      fluticasone (FLONASE) 50 MCG/ACT nasal spray 1 spray by Nasal route daily      fexofenadine (ALLEGRA) 180

## 2024-05-29 DIAGNOSIS — E78.00 HYPERCHOLESTEROLEMIA: ICD-10-CM

## 2024-05-29 LAB
ALBUMIN SERPL-MCNC: 4.2 G/DL (ref 3.7–4.7)
ALBUMIN/GLOB SERPL: 1.8 {RATIO} (ref 1.2–2.2)
ALP SERPL-CCNC: 88 IU/L (ref 44–121)
ALT SERPL-CCNC: 13 IU/L (ref 0–32)
AST SERPL-CCNC: 23 IU/L (ref 0–40)
BASOPHILS # BLD AUTO: 0 X10E3/UL (ref 0–0.2)
BASOPHILS NFR BLD AUTO: 2 %
BILIRUB SERPL-MCNC: 0.3 MG/DL (ref 0–1.2)
BUN SERPL-MCNC: 13 MG/DL (ref 8–27)
BUN/CREAT SERPL: 14 (ref 12–28)
CALCIUM SERPL-MCNC: 9.1 MG/DL (ref 8.7–10.3)
CHLORIDE SERPL-SCNC: 102 MMOL/L (ref 96–106)
CHOLEST SERPL-MCNC: 172 MG/DL (ref 100–199)
CO2 SERPL-SCNC: 26 MMOL/L (ref 20–29)
CREAT SERPL-MCNC: 0.9 MG/DL (ref 0.57–1)
EGFRCR SERPLBLD CKD-EPI 2021: 64 ML/MIN/1.73
EOSINOPHIL # BLD AUTO: 0.1 X10E3/UL (ref 0–0.4)
EOSINOPHIL NFR BLD AUTO: 3 %
ERYTHROCYTE [DISTWIDTH] IN BLOOD BY AUTOMATED COUNT: 12.2 % (ref 11.7–15.4)
GLOBULIN SER CALC-MCNC: 2.4 G/DL (ref 1.5–4.5)
GLUCOSE SERPL-MCNC: 82 MG/DL (ref 70–99)
HBA1C MFR BLD: 5.8 % (ref 4.8–5.6)
HCT VFR BLD AUTO: 37.6 % (ref 34–46.6)
HDLC SERPL-MCNC: 72 MG/DL
HGB BLD-MCNC: 12.5 G/DL (ref 11.1–15.9)
IMM GRANULOCYTES # BLD AUTO: 0 X10E3/UL (ref 0–0.1)
IMM GRANULOCYTES NFR BLD AUTO: 0 %
LDLC SERPL CALC-MCNC: 84 MG/DL (ref 0–99)
LYMPHOCYTES # BLD AUTO: 1.1 X10E3/UL (ref 0.7–3.1)
LYMPHOCYTES NFR BLD AUTO: 39 %
MCH RBC QN AUTO: 32.1 PG (ref 26.6–33)
MCHC RBC AUTO-ENTMCNC: 33.2 G/DL (ref 31.5–35.7)
MCV RBC AUTO: 96 FL (ref 79–97)
MONOCYTES # BLD AUTO: 0.3 X10E3/UL (ref 0.1–0.9)
MONOCYTES NFR BLD AUTO: 13 %
NEUTROPHILS # BLD AUTO: 1.2 X10E3/UL (ref 1.4–7)
NEUTROPHILS NFR BLD AUTO: 43 %
PLATELET # BLD AUTO: 209 X10E3/UL (ref 150–450)
POTASSIUM SERPL-SCNC: 4.4 MMOL/L (ref 3.5–5.2)
PROT SERPL-MCNC: 6.6 G/DL (ref 6–8.5)
RBC # BLD AUTO: 3.9 X10E6/UL (ref 3.77–5.28)
SODIUM SERPL-SCNC: 142 MMOL/L (ref 134–144)
TRIGL SERPL-MCNC: 88 MG/DL (ref 0–149)
VLDLC SERPL CALC-MCNC: 16 MG/DL (ref 5–40)
WBC # BLD AUTO: 2.7 X10E3/UL (ref 3.4–10.8)

## 2024-05-29 RX ORDER — EZETIMIBE 10 MG/1
10 TABLET ORAL DAILY
Qty: 90 TABLET | Refills: 3 | Status: SHIPPED | OUTPATIENT
Start: 2024-05-29

## 2024-07-09 ENCOUNTER — PATIENT MESSAGE (OUTPATIENT)
Age: 79
End: 2024-07-09

## 2024-07-09 ENCOUNTER — TELEPHONE (OUTPATIENT)
Age: 79
End: 2024-07-09

## 2024-07-09 DIAGNOSIS — D72.819 LEUKOPENIA, UNSPECIFIED TYPE: Primary | ICD-10-CM

## 2024-07-09 NOTE — TELEPHONE ENCOUNTER
Reason for call:  pt has an appt with KEW on 07/11/24 and would labs done before the appt.  Please send them to [facility]. Call pt when done    Is this a new problem: Yes    Date of last appointment:  5/28/2024     Can we respond via [employer]: No    Best call back number:     Barbara George (Self) [phone] (Home)

## 2024-07-10 NOTE — TELEPHONE ENCOUNTER
From: Barbara George  To: Dr. Charmaine Pretty  Sent: 7/9/2024 4:50 PM EDT  Subject: Lab work    Please send a blood lab work request to [facility] as soon as possible. Thanks,

## 2024-07-10 NOTE — TELEPHONE ENCOUNTER
Notify patient she had full lab work done May 28, 2024 and I had wanted to repeat her CBC.  I placed an order for CBC  [facility].

## 2024-07-10 NOTE — TELEPHONE ENCOUNTER
Reason for call:  Second Call. TC from pt. Pt id verified by two identifiers. Pt calling to see if her lab orders will be sent to [facility] this morning so she can have them done before her appointment with Dr. Pretty tomorrow. Pt would like a call once orders have been placed so she can go get labs done this morning. Pt stated if Dr. Pretty did not want her to get any labs done, she still would like a call.     Is this a new problem: Yes    Date of last appointment:  5/28/2024     Can we respond via [employer]: No    Best call back number: [phone]

## 2024-07-11 ENCOUNTER — OFFICE VISIT (OUTPATIENT)
Age: 79
End: 2024-07-11
Payer: MEDICARE

## 2024-07-11 VITALS
HEART RATE: 74 BPM | WEIGHT: 111.2 LBS | DIASTOLIC BLOOD PRESSURE: 68 MMHG | RESPIRATION RATE: 16 BRPM | TEMPERATURE: 97.1 F | SYSTOLIC BLOOD PRESSURE: 113 MMHG | HEIGHT: 64 IN | OXYGEN SATURATION: 99 % | BODY MASS INDEX: 18.98 KG/M2

## 2024-07-11 DIAGNOSIS — F32.5 DEPRESSION, MAJOR, IN REMISSION (HCC): ICD-10-CM

## 2024-07-11 DIAGNOSIS — D64.9 ANEMIA, UNSPECIFIED TYPE: Primary | ICD-10-CM

## 2024-07-11 DIAGNOSIS — M85.89 OSTEOPENIA OF MULTIPLE SITES: ICD-10-CM

## 2024-07-11 DIAGNOSIS — M26.609 TMJ (TEMPOROMANDIBULAR JOINT DISORDER): ICD-10-CM

## 2024-07-11 DIAGNOSIS — Z78.0 POSTMENOPAUSAL: ICD-10-CM

## 2024-07-11 DIAGNOSIS — D72.819 LEUKOPENIA, UNSPECIFIED TYPE: ICD-10-CM

## 2024-07-11 DIAGNOSIS — Z00.00 MEDICARE ANNUAL WELLNESS VISIT, SUBSEQUENT: Primary | ICD-10-CM

## 2024-07-11 DIAGNOSIS — G47.00 INSOMNIA, UNSPECIFIED TYPE: ICD-10-CM

## 2024-07-11 DIAGNOSIS — E78.00 HYPERCHOLESTEROLEMIA: ICD-10-CM

## 2024-07-11 LAB
BASOPHILS # BLD: 0 K/UL (ref 0–0.1)
BASOPHILS NFR BLD: 0 % (ref 0–1)
DIFFERENTIAL METHOD BLD: ABNORMAL
EOSINOPHIL # BLD: 0.1 K/UL (ref 0–0.4)
EOSINOPHIL NFR BLD: 1 % (ref 0–7)
ERYTHROCYTE [DISTWIDTH] IN BLOOD BY AUTOMATED COUNT: 12.6 % (ref 11.5–14.5)
HCT VFR BLD AUTO: 32.5 % (ref 35–47)
HGB BLD-MCNC: 10.9 G/DL (ref 11.5–16)
IMM GRANULOCYTES # BLD AUTO: 0 K/UL (ref 0–0.04)
IMM GRANULOCYTES NFR BLD AUTO: 0 % (ref 0–0.5)
LYMPHOCYTES # BLD: 1 K/UL (ref 0.8–3.5)
LYMPHOCYTES NFR BLD: 22 % (ref 12–49)
MCH RBC QN AUTO: 32.1 PG (ref 26–34)
MCHC RBC AUTO-ENTMCNC: 33.5 G/DL (ref 30–36.5)
MCV RBC AUTO: 95.6 FL (ref 80–99)
MONOCYTES # BLD: 0.3 K/UL (ref 0–1)
MONOCYTES NFR BLD: 7 % (ref 5–13)
NEUTS SEG # BLD: 3.2 K/UL (ref 1.8–8)
NEUTS SEG NFR BLD: 70 % (ref 32–75)
NRBC # BLD: 0 K/UL (ref 0–0.01)
NRBC BLD-RTO: 0 PER 100 WBC
PLATELET # BLD AUTO: 189 K/UL (ref 150–400)
PMV BLD AUTO: 9.3 FL (ref 8.9–12.9)
RBC # BLD AUTO: 3.4 M/UL (ref 3.8–5.2)
WBC # BLD AUTO: 4.6 K/UL (ref 3.6–11)

## 2024-07-11 PROCEDURE — 99214 OFFICE O/P EST MOD 30 MIN: CPT | Performed by: INTERNAL MEDICINE

## 2024-07-11 PROCEDURE — 1123F ACP DISCUSS/DSCN MKR DOCD: CPT | Performed by: INTERNAL MEDICINE

## 2024-07-11 PROCEDURE — G0439 PPPS, SUBSEQ VISIT: HCPCS | Performed by: INTERNAL MEDICINE

## 2024-07-11 RX ORDER — TRAZODONE HYDROCHLORIDE 50 MG/1
TABLET ORAL
Qty: 135 TABLET | Refills: 1 | Status: SHIPPED | OUTPATIENT
Start: 2024-07-11

## 2024-07-11 ASSESSMENT — PATIENT HEALTH QUESTIONNAIRE - PHQ9
SUM OF ALL RESPONSES TO PHQ QUESTIONS 1-9: 0
2. FEELING DOWN, DEPRESSED OR HOPELESS: NOT AT ALL
1. LITTLE INTEREST OR PLEASURE IN DOING THINGS: NOT AT ALL
SUM OF ALL RESPONSES TO PHQ QUESTIONS 1-9: 0
SUM OF ALL RESPONSES TO PHQ9 QUESTIONS 1 & 2: 0

## 2024-07-11 ASSESSMENT — ENCOUNTER SYMPTOMS
DIARRHEA: 0
CONSTIPATION: 0
BLOOD IN STOOL: 0
COUGH: 0
ABDOMINAL PAIN: 0
SORE THROAT: 0
SHORTNESS OF BREATH: 0
BACK PAIN: 0
NAUSEA: 0

## 2024-07-11 NOTE — PROGRESS NOTES
Conjunctiva/sclera: Conjunctivae normal.      Pupils: Pupils are equal, round, and reactive to light.   Neck:      Vascular: No carotid bruit.   Cardiovascular:      Rate and Rhythm: Normal rate and regular rhythm.      Pulses: Normal pulses.      Heart sounds: Normal heart sounds.   Pulmonary:      Effort: Pulmonary effort is normal.      Breath sounds: Normal breath sounds.   Abdominal:      General: Bowel sounds are normal. There is no distension.      Palpations: Abdomen is soft. There is no mass.      Tenderness: There is no abdominal tenderness.   Musculoskeletal:         General: No swelling.      Cervical back: Normal range of motion.   Lymphadenopathy:      Cervical: No cervical adenopathy.   Skin:     Findings: No rash.   Neurological:      General: No focal deficit present.      Mental Status: She is alert.      Cranial Nerves: No cranial nerve deficit.      Sensory: No sensory deficit.   Psychiatric:         Mood and Affect: Mood normal.                                    Objective   Vitals:    07/11/24 1340   BP: 113/68   Pulse: 74   Resp: 16   Temp: 97.1 °F (36.2 °C)   TempSrc: Temporal   SpO2: 99%   Weight: 50.4 kg (111 lb 3.2 oz)   Height: 1.626 m (5' 4\")      Body mass index is 19.09 kg/m².                    Allergies   Allergen Reactions    Atorvastatin Myalgia    Oxycodone Other (See Comments)     Unable to walk, \"completely knocks me out\"    Simvastatin Myalgia    Sulfa Antibiotics Hives    Sulfur Dioxide      Other Reaction(s): Not available     Prior to Visit Medications    Medication Sig Taking? Authorizing Provider   betamethasone valerate (VALISONE) 0.1 % cream Apply topically 2 times daily Yes Cindy Miller MD   ezetimibe (ZETIA) 10 MG tablet Take 1 tablet by mouth daily Yes Charmaine Pretty MD   ipratropium (ATROVENT) 0.06 % nasal spray ADMINISTER 2 SPRAYS INTO EACH NOSTRIL 3 TIMES A DAY. Yes Cindy Miller MD   sertraline (ZOLOFT) 50 MG tablet Take 1 tablet by mouth daily

## 2024-07-11 NOTE — ASSESSMENT & PLAN NOTE
Reviewed last bone density 12/22 with high FRAX and wants to hold on medication until we recheck bone density in 12/24. Ca vit D and wt bearing exercise recommended.

## 2024-08-19 ENCOUNTER — TELEPHONE (OUTPATIENT)
Age: 79
End: 2024-08-19

## 2024-08-19 NOTE — TELEPHONE ENCOUNTER
----- Message from Jacquie FUENTES sent at 8/19/2024  8:09 AM EDT -----  Regarding: [facility] Appointment Request  [facility] Appointment Request    Patient needs appointment for E[facility] Appointment Type: Pre-Op Visit.    Patient Requested Dates(s): October 8-11, 2024 and October 30-November 6, 2024  Patient Requested Time: Anytime   Provider Name: Charmaine Pretty MD    Reason for Appointment Request: Established Patient - No appointments available during search  --------------------------------------------------------------------------------------------------------------------------    Relationship to Patient: Self     Call Back Information: OK to leave message on [employer]  Preferred Call Back Number: Phone [phone]    Note:  The patient wants to book an appointment for her Pre-Op Visit. Her surgery will be on November 7, 2024 and the surgery is for her Cataract.

## 2024-08-24 LAB
CHOLEST SERPL-MCNC: 159 MG/DL (ref 100–199)
HDLC SERPL-MCNC: 63 MG/DL
LDLC SERPL CALC-MCNC: 63 MG/DL (ref 0–99)
TRIGL SERPL-MCNC: 205 MG/DL (ref 0–149)
VLDLC SERPL CALC-MCNC: 33 MG/DL (ref 5–40)

## 2024-08-28 DIAGNOSIS — D64.9 ANEMIA, UNSPECIFIED TYPE: Primary | ICD-10-CM

## 2024-08-28 DIAGNOSIS — D64.9 ANEMIA, UNSPECIFIED TYPE: ICD-10-CM

## 2024-09-12 LAB
BASOPHILS # BLD AUTO: 0 X10E3/UL (ref 0–0.2)
BASOPHILS NFR BLD AUTO: 1 %
EOSINOPHIL # BLD AUTO: 0.1 X10E3/UL (ref 0–0.4)
EOSINOPHIL NFR BLD AUTO: 3 %
ERYTHROCYTE [DISTWIDTH] IN BLOOD BY AUTOMATED COUNT: 12.4 % (ref 11.7–15.4)
FERRITIN SERPL-MCNC: 93 NG/ML (ref 15–150)
FOLATE SERPL-MCNC: >20 NG/ML
HCT VFR BLD AUTO: 39.9 % (ref 34–46.6)
HGB BLD-MCNC: 13.1 G/DL (ref 11.1–15.9)
IMM GRANULOCYTES # BLD AUTO: 0 X10E3/UL (ref 0–0.1)
IMM GRANULOCYTES NFR BLD AUTO: 0 %
IRON SATN MFR SERPL: 37 % (ref 15–55)
IRON SERPL-MCNC: 117 UG/DL (ref 27–139)
LYMPHOCYTES # BLD AUTO: 1 X10E3/UL (ref 0.7–3.1)
LYMPHOCYTES NFR BLD AUTO: 35 %
MCH RBC QN AUTO: 31.9 PG (ref 26.6–33)
MCHC RBC AUTO-ENTMCNC: 32.8 G/DL (ref 31.5–35.7)
MCV RBC AUTO: 97 FL (ref 79–97)
MONOCYTES # BLD AUTO: 0.3 X10E3/UL (ref 0.1–0.9)
MONOCYTES NFR BLD AUTO: 12 %
NEUTROPHILS # BLD AUTO: 1.5 X10E3/UL (ref 1.4–7)
NEUTROPHILS NFR BLD AUTO: 49 %
PLATELET # BLD AUTO: 236 X10E3/UL (ref 150–450)
RBC # BLD AUTO: 4.11 X10E6/UL (ref 3.77–5.28)
TIBC SERPL-MCNC: 318 UG/DL (ref 250–450)
UIBC SERPL-MCNC: 201 UG/DL (ref 118–369)
VIT B12 SERPL-MCNC: 792 PG/ML (ref 232–1245)
WBC # BLD AUTO: 3 X10E3/UL (ref 3.4–10.8)

## 2024-09-17 ENCOUNTER — PATIENT MESSAGE (OUTPATIENT)
Age: 79
End: 2024-09-17

## 2024-10-29 ENCOUNTER — OFFICE VISIT (OUTPATIENT)
Age: 79
End: 2024-10-29
Payer: MEDICARE

## 2024-10-29 VITALS
BODY MASS INDEX: 18.98 KG/M2 | OXYGEN SATURATION: 98 % | SYSTOLIC BLOOD PRESSURE: 106 MMHG | HEIGHT: 64 IN | WEIGHT: 111.2 LBS | RESPIRATION RATE: 16 BRPM | TEMPERATURE: 97.5 F | DIASTOLIC BLOOD PRESSURE: 69 MMHG | HEART RATE: 76 BPM

## 2024-10-29 DIAGNOSIS — T24.132A SUPERFICIAL BURN OF LEFT LOWER LEG, INITIAL ENCOUNTER: Primary | ICD-10-CM

## 2024-10-29 DIAGNOSIS — Z01.818 PRE-OP EXAM: ICD-10-CM

## 2024-10-29 PROCEDURE — 99214 OFFICE O/P EST MOD 30 MIN: CPT | Performed by: NURSE PRACTITIONER

## 2024-10-29 RX ORDER — MUPIROCIN 20 MG/G
OINTMENT TOPICAL
Qty: 15 G | Refills: 1 | Status: SHIPPED | OUTPATIENT
Start: 2024-10-29 | End: 2024-11-05

## 2024-10-29 ASSESSMENT — PATIENT HEALTH QUESTIONNAIRE - PHQ9
SUM OF ALL RESPONSES TO PHQ QUESTIONS 1-9: 0
SUM OF ALL RESPONSES TO PHQ QUESTIONS 1-9: 0
2. FEELING DOWN, DEPRESSED OR HOPELESS: NOT AT ALL
SUM OF ALL RESPONSES TO PHQ9 QUESTIONS 1 & 2: 0
SUM OF ALL RESPONSES TO PHQ QUESTIONS 1-9: 0
SUM OF ALL RESPONSES TO PHQ QUESTIONS 1-9: 0
1. LITTLE INTEREST OR PLEASURE IN DOING THINGS: NOT AT ALL

## 2024-10-29 NOTE — PROGRESS NOTES
Date of Exam: 10/29/2024    Barbara George is a 79 y.o. female (:1945) who presents for preoperative evaluation and burn of left calf sustained a few days ago. It seems to be healing, but mild erythema with peeling of skin.   Procedure/Surgery: cataract  Date of Procedure/Surgery: 24  Surgeon: [facility]  Primary Physician: Charmaine Pretty MD  Latex Allergy: No    Current Outpatient Medications   Medication Sig Dispense Refill    mupirocin (BACTROBAN) 2 % ointment Apply topically 3 times daily. 15 g 1    betamethasone valerate (VALISONE) 0.1 % cream Apply topically 2 times daily      sertraline (ZOLOFT) 50 MG tablet Take 1 tablet by mouth daily 90 tablet 1    traZODone (DESYREL) 50 MG tablet TAKE 1 AND 1/2 TABLET BY MOUTH BY MOUTH NIGHTLY 135 tablet 1    ipratropium (ATROVENT) 0.06 % nasal spray ADMINISTER 2 SPRAYS INTO EACH NOSTRIL 3 TIMES A DAY.      colestipol (COLESTID) 1 g tablet PLEASE SEE ATTACHED FOR DETAILED DIRECTIONS      CALCIUM CARBONATE-VITAMIN D PO Take 300 mg by mouth      fluticasone (FLONASE) 50 MCG/ACT nasal spray 1 spray by Nasal route daily      fexofenadine (ALLEGRA) 180 MG tablet Take 1 tablet by mouth daily      diclofenac sodium (VOLTAREN) 1 % GEL APPLY 2 GRAMS 4 TIMES DAILY FOR 30 DAYS       No current facility-administered medications for this visit.        History reviewed. No pertinent past medical history.     Past Surgical History:   Procedure Laterality Date    BREAST LUMPECTOMY      BREAST LUMPECTOMY      BREAST LUMPECTOMY  2022     BREAST BIOPSY W LOC DEVICE 1ST LESION LEFT Left 2022    US BREAST BIOPSY W LOC DEVICE 1ST LESION LEFT 2022 [facility] RAD MAMMO        Tetanus up to date: last tetanus booster within 10 years      Anesthesia Complications: no  History of abnormal bleeding : no  History of Blood Transfusions: No  Health Care Directive or Living Will: Yes    REVIEW OF SYSTEMS:  Pertinent items are noted in

## 2025-01-06 DIAGNOSIS — E78.00 HYPERCHOLESTEROLEMIA: ICD-10-CM

## 2025-01-07 ENCOUNTER — PATIENT MESSAGE (OUTPATIENT)
Age: 80
End: 2025-01-07

## 2025-01-07 DIAGNOSIS — E78.00 HYPERCHOLESTEROLEMIA: Primary | ICD-10-CM

## 2025-01-07 RX ORDER — EZETIMIBE 10 MG/1
10 TABLET ORAL DAILY
Qty: 90 TABLET | Refills: 3 | OUTPATIENT
Start: 2025-01-07

## 2025-01-08 ENCOUNTER — HOSPITAL ENCOUNTER (OUTPATIENT)
Facility: HOSPITAL | Age: 80
Discharge: HOME | End: 2025-01-11
Attending: INTERNAL MEDICINE
Payer: MEDICARE

## 2025-01-08 VITALS — HEIGHT: 64 IN | WEIGHT: 110 LBS | BODY MASS INDEX: 18.78 KG/M2

## 2025-01-08 DIAGNOSIS — Z78.0 POSTMENOPAUSAL: ICD-10-CM

## 2025-01-08 PROCEDURE — 77080 DXA BONE DENSITY AXIAL: CPT

## 2025-01-14 RX ORDER — VALACYCLOVIR HYDROCHLORIDE 1 G/1
2000 TABLET, FILM COATED ORAL 2 TIMES DAILY
Qty: 12 TABLET | Refills: 0 | Status: SHIPPED | OUTPATIENT
Start: 2025-01-14 | End: 2025-01-15

## 2025-01-27 LAB
BASOPHILS # BLD AUTO: 0 X10E3/UL (ref 0–0.2)
BASOPHILS NFR BLD AUTO: 1 %
EOSINOPHIL # BLD AUTO: 0.1 X10E3/UL (ref 0–0.4)
EOSINOPHIL NFR BLD AUTO: 3 %
ERYTHROCYTE [DISTWIDTH] IN BLOOD BY AUTOMATED COUNT: 12 % (ref 11.7–15.4)
HCT VFR BLD AUTO: 37 % (ref 34–46.6)
HGB BLD-MCNC: 12.3 G/DL (ref 11.1–15.9)
IMM GRANULOCYTES # BLD AUTO: 0 X10E3/UL (ref 0–0.1)
IMM GRANULOCYTES NFR BLD AUTO: 0 %
LYMPHOCYTES # BLD AUTO: 1 X10E3/UL (ref 0.7–3.1)
LYMPHOCYTES NFR BLD AUTO: 29 %
MCH RBC QN AUTO: 32.4 PG (ref 26.6–33)
MCHC RBC AUTO-ENTMCNC: 33.2 G/DL (ref 31.5–35.7)
MCV RBC AUTO: 97 FL (ref 79–97)
MONOCYTES # BLD AUTO: 0.4 X10E3/UL (ref 0.1–0.9)
MONOCYTES NFR BLD AUTO: 12 %
NEUTROPHILS # BLD AUTO: 1.9 X10E3/UL (ref 1.4–7)
NEUTROPHILS NFR BLD AUTO: 55 %
PLATELET # BLD AUTO: 236 X10E3/UL (ref 150–450)
RBC # BLD AUTO: 3.8 X10E6/UL (ref 3.77–5.28)
WBC # BLD AUTO: 3.5 X10E3/UL (ref 3.4–10.8)

## 2025-01-28 ENCOUNTER — PATIENT MESSAGE (OUTPATIENT)
Age: 80
End: 2025-01-28

## 2025-01-29 SDOH — ECONOMIC STABILITY: INCOME INSECURITY: IN THE LAST 12 MONTHS, WAS THERE A TIME WHEN YOU WERE NOT ABLE TO PAY THE MORTGAGE OR RENT ON TIME?: NO

## 2025-01-29 SDOH — ECONOMIC STABILITY: FOOD INSECURITY: WITHIN THE PAST 12 MONTHS, THE FOOD YOU BOUGHT JUST DIDN'T LAST AND YOU DIDN'T HAVE MONEY TO GET MORE.: NEVER TRUE

## 2025-01-29 SDOH — ECONOMIC STABILITY: FOOD INSECURITY: WITHIN THE PAST 12 MONTHS, YOU WORRIED THAT YOUR FOOD WOULD RUN OUT BEFORE YOU GOT MONEY TO BUY MORE.: NEVER TRUE

## 2025-01-29 SDOH — ECONOMIC STABILITY: TRANSPORTATION INSECURITY
IN THE PAST 12 MONTHS, HAS THE LACK OF TRANSPORTATION KEPT YOU FROM MEDICAL APPOINTMENTS OR FROM GETTING MEDICATIONS?: NO

## 2025-01-30 ENCOUNTER — OFFICE VISIT (OUTPATIENT)
Age: 80
End: 2025-01-30
Payer: MEDICARE

## 2025-01-30 VITALS
WEIGHT: 112.6 LBS | HEART RATE: 88 BPM | HEIGHT: 64 IN | SYSTOLIC BLOOD PRESSURE: 109 MMHG | RESPIRATION RATE: 16 BRPM | DIASTOLIC BLOOD PRESSURE: 73 MMHG | OXYGEN SATURATION: 96 % | TEMPERATURE: 97.8 F | BODY MASS INDEX: 19.22 KG/M2

## 2025-01-30 DIAGNOSIS — G47.00 INSOMNIA, UNSPECIFIED TYPE: Primary | ICD-10-CM

## 2025-01-30 DIAGNOSIS — F32.5 DEPRESSION, MAJOR, IN REMISSION (HCC): ICD-10-CM

## 2025-01-30 DIAGNOSIS — C50.912 MALIGNANT NEOPLASM OF LEFT FEMALE BREAST, UNSPECIFIED ESTROGEN RECEPTOR STATUS, UNSPECIFIED SITE OF BREAST (HCC): ICD-10-CM

## 2025-01-30 DIAGNOSIS — M81.0 OSTEOPOROSIS OF FOREARM: ICD-10-CM

## 2025-01-30 PROCEDURE — 99214 OFFICE O/P EST MOD 30 MIN: CPT | Performed by: INTERNAL MEDICINE

## 2025-01-30 PROCEDURE — 1123F ACP DISCUSS/DSCN MKR DOCD: CPT | Performed by: INTERNAL MEDICINE

## 2025-01-30 PROCEDURE — 1159F MED LIST DOCD IN RCRD: CPT | Performed by: INTERNAL MEDICINE

## 2025-01-30 PROCEDURE — 1160F RVW MEDS BY RX/DR IN RCRD: CPT | Performed by: INTERNAL MEDICINE

## 2025-01-30 PROCEDURE — 1126F AMNT PAIN NOTED NONE PRSNT: CPT | Performed by: INTERNAL MEDICINE

## 2025-01-30 RX ORDER — TRAZODONE HYDROCHLORIDE 50 MG/1
TABLET, FILM COATED ORAL
Qty: 135 TABLET | Refills: 1 | Status: SHIPPED | OUTPATIENT
Start: 2025-01-30

## 2025-01-30 ASSESSMENT — PATIENT HEALTH QUESTIONNAIRE - PHQ9
5. POOR APPETITE OR OVEREATING: NOT AT ALL
9. THOUGHTS THAT YOU WOULD BE BETTER OFF DEAD, OR OF HURTING YOURSELF: NOT AT ALL
SUM OF ALL RESPONSES TO PHQ QUESTIONS 1-9: 0
4. FEELING TIRED OR HAVING LITTLE ENERGY: NOT AT ALL
SUM OF ALL RESPONSES TO PHQ QUESTIONS 1-9: 0
2. FEELING DOWN, DEPRESSED OR HOPELESS: NOT AT ALL
3. TROUBLE FALLING OR STAYING ASLEEP: NOT AT ALL
SUM OF ALL RESPONSES TO PHQ9 QUESTIONS 1 & 2: 0
6. FEELING BAD ABOUT YOURSELF - OR THAT YOU ARE A FAILURE OR HAVE LET YOURSELF OR YOUR FAMILY DOWN: NOT AT ALL
10. IF YOU CHECKED OFF ANY PROBLEMS, HOW DIFFICULT HAVE THESE PROBLEMS MADE IT FOR YOU TO DO YOUR WORK, TAKE CARE OF THINGS AT HOME, OR GET ALONG WITH OTHER PEOPLE: NOT DIFFICULT AT ALL
SUM OF ALL RESPONSES TO PHQ QUESTIONS 1-9: 0
7. TROUBLE CONCENTRATING ON THINGS, SUCH AS READING THE NEWSPAPER OR WATCHING TELEVISION: NOT AT ALL
1. LITTLE INTEREST OR PLEASURE IN DOING THINGS: NOT AT ALL
SUM OF ALL RESPONSES TO PHQ QUESTIONS 1-9: 0
8. MOVING OR SPEAKING SO SLOWLY THAT OTHER PEOPLE COULD HAVE NOTICED. OR THE OPPOSITE, BEING SO FIGETY OR RESTLESS THAT YOU HAVE BEEN MOVING AROUND A LOT MORE THAN USUAL: NOT AT ALL

## 2025-01-30 ASSESSMENT — ENCOUNTER SYMPTOMS
ABDOMINAL PAIN: 0
DIARRHEA: 1
SHORTNESS OF BREATH: 0

## 2025-01-30 NOTE — PROGRESS NOTES
Barbara George is a 79 y.o. female who was seen in clinic today (1/30/2025).      Assessment & Plan:   Below is the assessment and plan developed based on review of pertinent history, physical exam, labs, studies, and medications.  1. Insomnia, unspecified type  Assessment & Plan:   Averaging 7 hours of sleep night. Tolerating trazodone well.   Orders:  -     traZODone (DESYREL) 50 MG tablet; TAKE 1 AND 1/2 TABLET BY MOUTH BY MOUTH NIGHTLY, Disp-135 tablet, R-1Normal  2. Osteoporosis of forearm  Assessment & Plan:  Reviewed recent bone density with worsening and now osteoporosis of forearm and meets criteria for medication and revivew fosamax risk and benefits and need for dental eval before starting. At this point opts not to start medication and will continue to work on ca, D3 and wt bearing exercise. Check vit d level.   Orders:  -     Vitamin D 25 Hydroxy; Future  -     Basic Metabolic Panel; Future  3. Malignant neoplasm of left female breast, unspecified estrogen receptor status, unspecified site of breast (HCC)  Assessment & Plan:   Monitored by specialist- no acute findings meriting change in the plan  4. Depression, major, in remission (HCC)  Assessment & Plan:   Has stopped medication and denies any depression.      Return in about 6 months (around 7/30/2025) for MWV.   Subjective/Objective:   Barbara was seen today for Follow-up   follow up active medical problems and medication management.   Patient Active Problem List   Diagnosis    Adenomatous polyp of cecum    H/O ETOH abuse    Allergic rhinitis    Osteopenia    Hypercholesterolemia    Depression, major, in remission (HCC)    Malignant neoplasm of left female breast, unspecified estrogen receptor status, unspecified site of breast (HCC)    Insomnia, unspecified type     Taking medication with no side effects.   Exercise: resistance training and walking Diet healthy taking calcium       Current Outpatient Medications   Medication Sig Dispense Refill

## 2025-01-30 NOTE — ASSESSMENT & PLAN NOTE
Reviewed recent bone density with worsening and now osteoporosis of forearm and meets criteria for medication and revivew fosamax risk and benefits and need for dental eval before starting. At this point opts not to start medication and will continue to work on ca, D3 and wt bearing exercise. Check vit d level.

## 2025-01-31 LAB
CHOLEST SERPL-MCNC: 174 MG/DL (ref 100–199)
HDLC SERPL-MCNC: 68 MG/DL
LDLC SERPL CALC-MCNC: 91 MG/DL (ref 0–99)
TRIGL SERPL-MCNC: 78 MG/DL (ref 0–149)
VLDLC SERPL CALC-MCNC: 15 MG/DL (ref 5–40)

## 2025-02-07 LAB
25(OH)D3+25(OH)D2 SERPL-MCNC: 63.4 NG/ML (ref 30–100)
BUN SERPL-MCNC: 17 MG/DL (ref 8–27)
BUN/CREAT SERPL: 20 (ref 12–28)
CALCIUM SERPL-MCNC: 9.2 MG/DL (ref 8.7–10.3)
CHLORIDE SERPL-SCNC: 102 MMOL/L (ref 96–106)
CO2 SERPL-SCNC: 25 MMOL/L (ref 20–29)
CREAT SERPL-MCNC: 0.83 MG/DL (ref 0.57–1)
EGFRCR SERPLBLD CKD-EPI 2021: 71 ML/MIN/1.73
GLUCOSE SERPL-MCNC: 94 MG/DL (ref 70–99)
POTASSIUM SERPL-SCNC: 4.5 MMOL/L (ref 3.5–5.2)
SODIUM SERPL-SCNC: 142 MMOL/L (ref 134–144)

## 2025-02-25 RX ORDER — VALACYCLOVIR HYDROCHLORIDE 1 G/1
2000 TABLET, FILM COATED ORAL 2 TIMES DAILY
Qty: 12 TABLET | Refills: 0 | Status: SHIPPED | OUTPATIENT
Start: 2025-02-25 | End: 2025-02-26

## 2025-02-25 RX ORDER — VALACYCLOVIR HYDROCHLORIDE 1 G/1
2000 TABLET, FILM COATED ORAL 2 TIMES DAILY
Qty: 12 TABLET | Refills: 0 | Status: SHIPPED | OUTPATIENT
Start: 2025-02-25 | End: 2025-02-25 | Stop reason: SDUPTHER

## 2025-03-09 ENCOUNTER — PATIENT MESSAGE (OUTPATIENT)
Age: 80
End: 2025-03-09

## 2025-03-13 RX ORDER — ALENDRONATE SODIUM 70 MG/1
70 TABLET ORAL
Qty: 12 TABLET | Refills: 0 | Status: SHIPPED | OUTPATIENT
Start: 2025-03-13

## (undated) DEVICE — GARMENT,MEDLINE,DVT,INT,CALF,MED, GEN2: Brand: MEDLINE

## (undated) DEVICE — BAG BELONG PT PERS CLEAR HANDL

## (undated) DEVICE — NEEDLE HYPO 22GA L1.5IN BLK S STL HUB POLYPR SHLD REG BVL

## (undated) DEVICE — SYRINGE MED 20ML STD CLR PLAS LUERLOCK TIP N CTRL DISP

## (undated) DEVICE — STERILE POLYISOPRENE POWDER-FREE SURGICAL GLOVES WITH EMOLLIENT COATING: Brand: PROTEXIS

## (undated) DEVICE — PREP SKN CHLRAPRP APL 26ML STR --

## (undated) DEVICE — DRAPE,UTILTY,TAPE,15X26, 4EA/PK: Brand: MEDLINE

## (undated) DEVICE — CATH IV AUTOGRD BC BLU 22GA 25 -- INSYTE

## (undated) DEVICE — INFECTION CONTROL KIT SYS

## (undated) DEVICE — LAPAROSCOPIC TROCAR SLEEVE/SINGLE USE: Brand: KII® OPTICAL ACCESS SYSTEM

## (undated) DEVICE — Z DISCONTINUED USE 2751540 TUBING IRRIG L10IN DISP PMP ENDOGATOR

## (undated) DEVICE — 4-PORT MANIFOLD: Brand: NEPTUNE 2

## (undated) DEVICE — KIT IV STRT W CHLORAPREP PD 1ML

## (undated) DEVICE — QUILTED PREMIUM COMFORT UNDERPAD,EXTRA HEAVY: Brand: WINGS

## (undated) DEVICE — SET ADMIN 16ML TBNG L100IN 2 Y INJ SITE IV PIGGY BK DISP

## (undated) DEVICE — CONNECTOR TBNG AUX H2O JET DISP FOR OLY 160/180 SER

## (undated) DEVICE — BW-412T DISP COMBO CLEANING BRUSH: Brand: SINGLE USE COMBINATION CLEANING BRUSH

## (undated) DEVICE — INSUFFLATION NEEDLE TO ESTABLISH PNEUMOPERITONEUM.: Brand: INSUFFLATION NEEDLE

## (undated) DEVICE — ARTICULATING RELOAD WITH TRI-STAPLE TECHNOLOGY: Brand: ENDO GIA

## (undated) DEVICE — SYSTEM EVAC SMOKE LAPARSCOPIC

## (undated) DEVICE — AIRLIFE™ U/CONNECT-IT OXYGEN TUBING 7 FEET (2.1 M) CRUSH-RESISTANT OXYGEN TUBING, VINYL TIPPED: Brand: AIRLIFE™

## (undated) DEVICE — DERMABOND SKIN ADH 0.7ML -- DERMABOND ADVANCED 12/BX

## (undated) DEVICE — POWER SHELL: Brand: SIGNIA

## (undated) DEVICE — TROCAR: Brand: KII® OPTICAL ACCESS SYSTEM

## (undated) DEVICE — NEEDLE HYPO 18GA L1.5IN PNK S STL HUB POLYPR SHLD REG BVL

## (undated) DEVICE — SUTURE MCRYL SZ 4-0 L27IN ABSRB UD L19MM PS-2 1/2 CIR PRIM Y426H

## (undated) DEVICE — MARYLAND JAW LAPAROSCOPIC SEALER/DIVIDER COATED: Brand: LIGASURE

## (undated) DEVICE — REM POLYHESIVE ADULT PATIENT RETURN ELECTRODE: Brand: VALLEYLAB

## (undated) DEVICE — KENDALL RADIOLUCENT FOAM MONITORING ELECTRODE -RECTANGULAR SHAPE: Brand: KENDALL

## (undated) DEVICE — STRAP,POSITIONING,KNEE/BODY,FOAM,4X60": Brand: MEDLINE

## (undated) DEVICE — ENDO CARRY-ON PROCEDURE KIT INCLUDES ENZYMATIC SPONGE, GAUZE, BIOHAZARD LABEL, TRAY, LUBRICANT, DIRTY SCOPE LABEL, WATER LABEL, TRAY, DRAWSTRING PAD, AND DEFENDO 4-PIECE KIT.: Brand: ENDO CARRY-ON PROCEDURE KIT

## (undated) DEVICE — 1200 GUARD II KIT W/5MM TUBE W/O VAC TUBE: Brand: GUARDIAN

## (undated) DEVICE — SOLIDIFIER FLUID 3000 CC ABSORB

## (undated) DEVICE — SET EXTN TBNG L BOR 4 W STPCOCK ST 32IN PRIMING VOL 6ML

## (undated) DEVICE — SURGICAL PROCEDURE KIT GEN LAPAROSCOPY LF

## (undated) DEVICE — TISSUE RETRIEVAL SYSTEM: Brand: INZII RETRIEVAL SYSTEM

## (undated) DEVICE — SUTURE SZ 0 27IN 5/8 CIR UR-6  TAPER PT VIOLET ABSRB VICRYL J603H